# Patient Record
Sex: FEMALE | Race: BLACK OR AFRICAN AMERICAN | NOT HISPANIC OR LATINO | Employment: FULL TIME | URBAN - METROPOLITAN AREA
[De-identification: names, ages, dates, MRNs, and addresses within clinical notes are randomized per-mention and may not be internally consistent; named-entity substitution may affect disease eponyms.]

---

## 2017-11-07 PROBLEM — F50.83 PICA IN ADULTS: Status: ACTIVE | Noted: 2017-11-07

## 2024-05-14 NOTE — PROGRESS NOTES
"Pedro Dunn is a 63 y.o. female.     CC: Leg Numbness    History of Present Illness     Pt comes in today reporting some \"tingling\" of the right leg for about 2 weeks or so w/o injury/pain. Pt reports she was at work and pushed a large heavy box laterally with the right leg, and reports 2 days later having numbness of the leg.  No change in B/B.  Patient reports this is not a work comp injury.      The following portions of the patient's history were reviewed and updated as appropriate: allergies, current medications, past family history, past medical history, past social history, past surgical history, and problem list.    Review of Systems   Constitutional:  Negative for activity change, chills and fever.   Respiratory:  Negative for cough.    Cardiovascular:  Negative for chest pain.   Neurological:  Positive for numbness.   Psychiatric/Behavioral:  Negative for dysphoric mood.        /87   Pulse 74   Temp 97.9 °F (36.6 °C) (Oral)   Resp 16   Ht 160 cm (63\")   Wt 86.2 kg (190 lb)   BMI 33.66 kg/m²     Objective   Physical Exam  Constitutional:       General: She is not in acute distress.     Appearance: She is well-developed.   Pulmonary:      Effort: Pulmonary effort is normal.   Neurological:      Mental Status: She is alert and oriented to person, place, and time.      Sensory: Sensation is intact.      Motor: Motor function is intact.   Psychiatric:         Behavior: Behavior normal.         Thought Content: Thought content normal.         Assessment & Plan   Diagnoses and all orders for this visit:    1. Disturbance of skin sensation (Primary)  -     methylPREDNISolone (Medrol) 4 MG dose pack; follow package directions  Dispense: 21 tablet; Refill: 0  -     diclofenac (VOLTAREN) 75 MG EC tablet; Take 1 tablet by mouth 2 (Two) Times a Day. Start this after finishing the Medrol.  Dispense: 60 tablet; Refill: 2                 "

## 2024-05-15 ENCOUNTER — OFFICE VISIT (OUTPATIENT)
Dept: FAMILY MEDICINE CLINIC | Facility: CLINIC | Age: 64
End: 2024-05-15
Payer: COMMERCIAL

## 2024-05-15 VITALS
BODY MASS INDEX: 33.66 KG/M2 | DIASTOLIC BLOOD PRESSURE: 87 MMHG | SYSTOLIC BLOOD PRESSURE: 145 MMHG | TEMPERATURE: 97.9 F | HEART RATE: 74 BPM | RESPIRATION RATE: 16 BRPM | WEIGHT: 190 LBS | HEIGHT: 63 IN

## 2024-05-15 DIAGNOSIS — R20.9 DISTURBANCE OF SKIN SENSATION: Primary | ICD-10-CM

## 2024-05-15 PROBLEM — K76.0 FATTY LIVER: Status: ACTIVE | Noted: 2024-05-15

## 2024-05-15 PROCEDURE — 99213 OFFICE O/P EST LOW 20 MIN: CPT | Performed by: FAMILY MEDICINE

## 2024-05-15 RX ORDER — METHYLPREDNISOLONE 4 MG/1
TABLET ORAL
Qty: 21 TABLET | Refills: 0 | Status: SHIPPED | OUTPATIENT
Start: 2024-05-15

## 2024-05-15 RX ORDER — DICLOFENAC SODIUM 75 MG/1
75 TABLET, DELAYED RELEASE ORAL 2 TIMES DAILY
Qty: 60 TABLET | Refills: 2 | Status: SHIPPED | OUTPATIENT
Start: 2024-05-15

## 2024-05-15 RX ORDER — FLUTICASONE PROPIONATE 50 MCG
1 SPRAY, SUSPENSION (ML) NASAL
COMMUNITY
Start: 2024-01-09

## 2024-06-04 ENCOUNTER — HOSPITAL ENCOUNTER (EMERGENCY)
Facility: HOSPITAL | Age: 64
Discharge: HOME | End: 2024-06-04
Attending: STUDENT IN AN ORGANIZED HEALTH CARE EDUCATION/TRAINING PROGRAM | Admitting: STUDENT IN AN ORGANIZED HEALTH CARE EDUCATION/TRAINING PROGRAM
Payer: COMMERCIAL

## 2024-06-04 ENCOUNTER — APPOINTMENT (OUTPATIENT)
Dept: CT IMAGING | Facility: HOSPITAL | Age: 64
End: 2024-06-04
Payer: COMMERCIAL

## 2024-06-04 ENCOUNTER — TELEPHONE (OUTPATIENT)
Dept: FAMILY MEDICINE CLINIC | Facility: CLINIC | Age: 64
End: 2024-06-04
Payer: COMMERCIAL

## 2024-06-04 VITALS
RESPIRATION RATE: 16 BRPM | HEIGHT: 63 IN | TEMPERATURE: 99.1 F | BODY MASS INDEX: 32.43 KG/M2 | WEIGHT: 183 LBS | HEART RATE: 82 BPM | OXYGEN SATURATION: 93 % | SYSTOLIC BLOOD PRESSURE: 130 MMHG | DIASTOLIC BLOOD PRESSURE: 77 MMHG

## 2024-06-04 DIAGNOSIS — M54.31 RIGHT SIDED SCIATICA: Primary | ICD-10-CM

## 2024-06-04 DIAGNOSIS — M79.604 PAIN OF RIGHT LOWER EXTREMITY: ICD-10-CM

## 2024-06-04 DIAGNOSIS — R20.9 DISTURBANCE OF SKIN SENSATION: Primary | ICD-10-CM

## 2024-06-04 PROCEDURE — 72131 CT LUMBAR SPINE W/O DYE: CPT

## 2024-06-04 PROCEDURE — 99284 EMERGENCY DEPT VISIT MOD MDM: CPT

## 2024-06-04 RX ORDER — BACLOFEN 10 MG/1
10 TABLET ORAL 3 TIMES DAILY PRN
Qty: 20 TABLET | Refills: 0 | Status: SHIPPED | OUTPATIENT
Start: 2024-06-04

## 2024-06-04 RX ORDER — PREDNISONE 50 MG/1
50 TABLET ORAL DAILY
Qty: 7 TABLET | Refills: 0 | Status: SHIPPED | OUTPATIENT
Start: 2024-06-04

## 2024-06-04 NOTE — ED PROVIDER NOTES
EMERGENCY DEPARTMENT ENCOUNTER    Room Number:  06/06  Date of encounter:  6/4/2024  PCP: Wojciech Garcia MD  Historian: Patient  Chronic or social conditions impacting care (social determinants of health): None    HPI:  Chief Complaint: Back pain, right leg pain  A complete HPI/ROS/PMH/PSH/SH/FH are unobtainable due to: Nothing    Context: Wendy Dunn is a 63 y.o. female with a history of hypercholesterolemia, previous neck surgery.  She presents to the ED c/o acute low back pain with radiation down the right leg.  Patient describes a burning, no sensation from her right buttocks down her right heel.  Symptoms have been present for 1 month.  She denies any overt weakness.  She is able to ambulate.  She denies any true bowel or bladder incontinence.  Patient states she had seen her primary care doctor and was given steroids without much improvement.  She denies any trauma.    Review of prior external notes (non-ED):   Reviewed primary care office visit from 5/15/2024.  Patient seen for right leg tingling.  She was placed on a Medrol Dosepak and Voltaren.    I reviewed an ER visit from 8/5/2021.  Patient seen for sciatica of the right leg with similar complaints.    Review of prior external test results outside of this encounter:  I reviewed a CMP from 5/3/2023.  Creatinine 0.7, potassium 4.0    PAST MEDICAL HISTORY  Active Ambulatory Problems     Diagnosis Date Noted    Osteoporosis 11/18/2015    Pica in adults 11/07/2017    Spinal stenosis in cervical region 12/08/2017    Degeneration of cervical intervertebral disc 12/08/2017    Colon cancer screening 12/27/2017    Pain of upper abdomen 12/01/2020    Gastroesophageal reflux disease 12/01/2020    Nausea and vomiting 12/01/2020    Weight loss 12/01/2020    Fatty liver 05/15/2024     Resolved Ambulatory Problems     Diagnosis Date Noted    No Resolved Ambulatory Problems     Past Medical History:   Diagnosis Date    GERD (gastroesophageal reflux disease)           PAST SURGICAL HISTORY  Past Surgical History:   Procedure Laterality Date    ANTERIOR CERVICAL DISCECTOMY W/ FUSION N/A 2018    Procedure: C4 to C6 anterior cervical discectomy, fusion, and instrumentation;  Surgeon: Padilla Saleh MD;  Location: [facility] MAIN OR;  Service:     COLONOSCOPY N/A 3/5/2018    Procedure: COLONOSCOPY TO CECUM AND TI; COLD POLYPECTOMY;  Surgeon: Benita Barnes MD;  Location: [facility] ENDOSCOPY;  Service:     MAMMO BILATERAL  2015    normal    THYROIDECTOMY, PARTIAL      TOTAL ABDOMINAL HYSTERECTOMY           FAMILY HISTORY  Family History   Problem Relation Age of Onset    Stroke Mother     Hypertension Mother     Stroke Father     Stroke Sister     Malig Hyperthermia Neg Hx          SOCIAL HISTORY  Social History     Socioeconomic History    Marital status:    Tobacco Use    Smoking status: Former     Current packs/day: 0.00     Types: Cigarettes     Quit date:      Years since quittin.4    Smokeless tobacco: Never   Substance and Sexual Activity    Alcohol use: No    Drug use: No    Sexual activity: Never         ALLERGIES  Patient has no known allergies.        REVIEW OF SYSTEMS  All systems reviewed and negative except for those discussed in HPI.       PHYSICAL EXAM    I have reviewed the triage vital signs and nursing notes.    ED Triage Vitals   Temp Heart Rate Resp BP SpO2   24 1128 24 1128 24 1128 24 1135 24 1128   99.1 °F (37.3 °C) 103 16 157/93 99 %      Temp src Heart Rate Source Patient Position BP Location FiO2 (%)   24 1128 24 1128 -- -- --   Temporal Monitor          Physical Exam  GENERAL: Alert, oriented, nontoxic-appearing, not distressed  HENT: head atraumatic, no nuchal rigidity  EYES: no scleral icterus, EOMI  CV: regular rhythm, regular rate, no murmur  RESPIRATORY: normal effort, CTA  ABDOMEN: soft, nontender  MUSCULOSKELETAL: Preserved range of motion of bilateral lower extremities.  2+  pulses intact distally.  Calves soft bilaterally.  NEURO: alert, 5/5 strength in bilateral lower extremities, negative straight leg raise bilaterally.  Sensation grossly intact distally.  SKIN: warm, dry      RADIOLOGY  CT Lumbar Spine Without Contrast    Result Date: 6/4/2024  CT LUMBAR SPINE WITHOUT CONTRAST  HISTORY: Back pain, right radiculopathy for 1 month.  COMPARISON: None.  FINDINGS: There is mild-to-moderate loss of disc height and vacuum disc effect at L4-L5 as well as retrolisthesis of L4 upon L5 estimated to be 6 mm.  L1-L2: There is no evidence of a disc bulge or herniation.  L2-L3: A mild central disc bulge is present with no evidence of herniation. Mild facet degenerative disease is present bilaterally. There is mild and mild-to-moderate foraminal stenosis on the right and left respectively secondary to extension of a small disc osteophyte complex into the neural foramen and to congenitally shortened pedicles.  L3-L4: Mild facet degenerative disease is present bilaterally. A mild broad-based disc osteophyte complex is present which results in mild canal stenosis. There is likely moderate lateral recess narrowing bilaterally secondary to posterior element degenerative disease, broad-based disc osteophyte complex and the congenitally shortened pedicles. Mild foraminal stenosis is present bilaterally secondary to extension of a small disc osteophyte complex into the neural foramen.  L4-L5: A mild broad-based disc osteophyte complex is present which is slightly more prominent to the left. Lateral recess narrowing is present bilaterally secondary to the broad-based disc osteophyte complex, mild facet and ligamentum flavum hypertrophy and the retrolisthesis of L4 upon L5. Mild foraminal stenosis is present bilaterally, slightly more prominent on the left.  L5-S1: Transitional anatomy is appreciated with what is being considered to be partial sacralization of L5. Mild facet degenerative disease is present  bilaterally.      1.  Transitional anatomy is appreciated with what is being considered to be partial sacralization of L5. Careful correlation with all imaging is required prior to any intervention given the presence of transitional anatomy. 2.  There is no evidence of a focal herniation. Multilevel degenerative disease involving the lumbar spine is noted including loss of disc height, vacuum disc effect and retrolisthesis at L4-L5. There is mild canal stenosis and likely moderate lateral recess narrowing bilaterally at L3-L4. Lateral recess narrowing is also present bilaterally at L4-L5. Lateral recess narrowing and spinal stenosis is accentuated by congenitally shortened pedicles. Further evaluation could be performed with a MRI examination of the lumbar spine as indicated.      Radiation dose reduction techniques were utilized, including automated exposure control and exposure modulation based on body size.        I ordered the above noted radiological studies. Reviewed by me and discussed with radiologist.  See dictation for official radiology interpretation.      MEDICATIONS GIVEN IN ER    Medications - No data to display      ADDITIONAL ORDERS CONSIDERED BUT NOT ORDERED:  Considered admission however patient has a normal neuroexam.  No evidence of cauda equina syndrome.      PROGRESS, DATA ANALYSIS, CONSULTS, AND MEDICAL DECISION MAKING    All labs have been independently interpreted by myself.  All radiology studies have been independently interpreted by myself and discussed with radiologist dictating the report.   EKG's independently interpreted by myself.  Discussion below represents my analysis of pertinent findings related to patient's condition, differential diagnosis, treatment plan and final disposition.    I have discussed case with Dr. Montez, emergency room physician.  He has performed his own bedside examination and agrees with treatment plan.    ED Course as of 06/04/24 1536   Tue Jun 04, 2024    1144 Patient presents with a 1 month history of low back pain with radiation down the right leg.  Patient describes a burning, numb sensation.  No overt weakness on exam.  No bowel or bladder incontinence.  Suspect likely sciatica.  Plan for CT imaging of the lumbar spine for further evaluation. [EE]   1323 Lumbar spine images independently interpreted myself show no evidence of acute compression fracture or significant disc herniation. [EE]   1336 Updated patient on CT findings.  She is neurologically intact without any evidence of weakness.  No evidence of DVT.  She has had similar symptoms in the past with her sciatica.  She also has had a history of cervical surgery after cervical radiculopathy.  We will discharge her on a course of prednisone and have her follow-up with her neurosurgeon. [EE]      ED Course User Index  [EE] Simone Yee PA       AS OF 15:36 EDT VITALS:    BP - 130/77  HR - 82  TEMP - 99.1 °F (37.3 °C) (Temporal)  O2 SATS - 93%        DIAGNOSIS  Final diagnoses:   Right sided sciatica         DISPOSITION  Discharged    Admission was considered but after careful review of the patient's presentation, physical examination, diagnostic results, and response to treatment the patient may be safely discharged with outpatient follow-up.         Dictated utilizing Dragon dictation     Simone Yee PA  06/04/24 9736

## 2024-06-04 NOTE — TELEPHONE ENCOUNTER
Left patient a voicemail informing her that a her orthopedic referral was place by Dr. Garcia. Informed her the the referral process typically can take up to 7-14 days once approved someone from the orthopedic office will contact her for scheduling.  HUB TO RELAY

## 2024-06-04 NOTE — ED NOTES
Pt arrives via PV from home; states that she has been having right leg weakness/numbness/tingling for approximately a month. Reports the numbness has been going down the back of the leg to the heel, which has worsen to the front of the leg. Has been seen by her PCP with steroids/muscle relaxers with no relief. Pt is Aox4 at time of evaluation and answering questions appropriately.

## 2024-06-04 NOTE — TELEPHONE ENCOUNTER
Caller: Wendy Dunn    Relationship to patient: Self    Best call back number:     Wendy Dunn (Self) [phone] (Home)       Chief complaint: SHE IS STILL HAVING ISSUES WITH HER LEG   PREDNISONE DID NOT HELP     PATIENT STATED THAT HER LEG IS TINGLING       Patient directed to call 911 or go to their nearest emergency room.     Patient verbalized understanding: [x] Yes  [] No  If no, why?    Additional notes:

## 2024-06-04 NOTE — ED PROVIDER NOTES
EMERGENCY DEPARTMENT MD ATTESTATION NOTE    Room Number:  06/06  PCP: Wojciech Garcia MD  Independent Historians: Patient    HPI:    Context: Wendy Dunn is a 63 y.o. female with a medical history of chronic neck pain s/p surgery, hypercholesterolemia who presents to the ED c/o acute low back pain and right leg pain.  Patient states she has some weakness in the right leg particularly when going up and down stairs.  Patient states the pain in her back is minimal.  Symptoms have been going for approximately 1 month, she relates the onset to having moved several heavy boxes with her leg.  Patient has tingling sensation in the right lower extremity.  Patient denies bowel and bladder incontinence, saddle paresthesias and fevers.        Review of prior external notes (non-ED) -and- Review of prior external test results outside of this encounter: Office visit with family medicine from 5/15/2024 reviewed and notable for presentation secondary to disturbance of skin sensation.  Plan at that time was to trial Voltaren and methylprednisolone and follow-up as needed    Prescription drug monitoring program review:           PHYSICAL EXAM    I have reviewed the triage vital signs and nursing notes.    ED Triage Vitals   Temp Heart Rate Resp BP SpO2   06/04/24 1128 06/04/24 1128 06/04/24 1128 06/04/24 1135 06/04/24 1128   99.1 °F (37.3 °C) 103 16 157/93 99 %      Temp src Heart Rate Source Patient Position BP Location FiO2 (%)   06/04/24 1128 06/04/24 1128 -- -- --   Temporal Monitor          Physical Exam  GENERAL: alert, no acute distress  SKIN: Warm, dry  HENT: Normocephalic, atraumatic  EYES: no scleral icterus  CV: regular rhythm, regular rate  RESPIRATORY: normal effort, lungs clear  ABDOMEN: soft, nontender, nondistended  MUSCULOSKELETAL: no deformity.  Mild weakness to the right lower extremity-likely limited secondary to pain.  NEURO: alert, moves all extremities, follows commands            MEDICATIONS GIVEN IN  ER  Medications - No data to display      ORDERS PLACED DURING THIS VISIT:  Orders Placed This Encounter   Procedures   • CT Lumbar Spine Without Contrast         PROCEDURES  Procedures            PROGRESS, DATA ANALYSIS, CONSULTS, AND MEDICAL DECISION MAKING  All labs have been independently interpreted by me.  All radiology studies have been reviewed by me. All EKG's have been independently viewed and interpreted by me.  Discussion below represents my analysis of pertinent findings related to patient's condition, differential diagnosis, treatment plan and final disposition.    Differential diagnosis includes but is not limited to sciatica, muscle strain, cauda equina.    Clinical Scores:                   ED Course as of 06/04/24 1837   Tue Jun 04, 2024   1144 Patient presents with a 1 month history of low back pain with radiation down the right leg.  Patient describes a burning, numb sensation.  No overt weakness on exam.  No bowel or bladder incontinence.  Suspect likely sciatica.  Plan for CT imaging of the lumbar spine for further evaluation. [EE]   1323 Lumbar spine images independently interpreted myself show no evidence of acute compression fracture or significant disc herniation. [EE]   1336 Updated patient on CT findings.  She is neurologically intact without any evidence of weakness.  No evidence of DVT.  She has had similar symptoms in the past with her sciatica.  She also has had a history of cervical surgery after cervical radiculopathy.  We will discharge her on a course of prednisone and have her follow-up with her neurosurgeon. [EE]      ED Course User Index  [EE] Simone Yee PA       MDM: 63-year-old female presenting for evaluation of back pain and right lower extremity discomfort.  Patient with no red flag symptoms.  Had a lengthy discussion with patient regarding CT imaging and plan moving forward.  I offered admission to the observation unit for MRI and neurosurgery consultation.  At this  time patient would like to discharge home.  Will try a larger dose of steroids and arrange for outpatient neurosurgery follow-up.      COMPLEXITY OF CARE  Admission was considered but after careful review of the patient's presentation, physical examination, diagnostic results, and response to treatment the patient may be safely discharged with outpatient follow-up.    Please note that portions of this document were completed with a voice recognition program.    Note Disclaimer: At [facility], we believe that sharing information builds trust and better relationships. You are receiving this note because you recently visited [facility]. It is possible you will see health information before a provider has talked with you about it. This kind of information can be easy to misunderstand. To help you fully understand what it means for your health, we urge you to discuss this note with your provider.         Jesus Montez MD  06/04/24 9684       Jesus Montez MD  06/04/24 9453       Jesus Montez MD  06/04/24 3240

## 2024-06-12 ENCOUNTER — TELEPHONE (OUTPATIENT)
Dept: NEUROSURGERY | Facility: CLINIC | Age: 64
End: 2024-06-12
Payer: COMMERCIAL

## 2024-06-12 NOTE — TELEPHONE ENCOUNTER
Caller: CHRISSY ANDRADE    Relationship: SELF    Best call back number: [phone]    What was the call regarding: PATIENT IS SCHEDULED 06/27 NEW PATIENT - CALLED STATING THAT PAIN IN HER LEGS IS GETTING WORST AND DOES NOT THINK SHE CAN MAKE IT UNTIL HER APPT - WANTS TO KNOW IF SHE IS ABLE TO BE SEEN SOONER    STATES SHE CAN WALK A LITTLE - HER APTS HAS 3 FLIGHTS OF STAIRS AND IT'S HARD TO HER TO GET UP AND DOWN THE STAIRS      PLEASE CALL PATIENT ADVISE  THANK YOU

## 2024-06-12 NOTE — TELEPHONE ENCOUNTER
Called and lvm to advise the patient to be seen at the ER as there is an appt on 6/26 but seeing as it is still so far that will not be of any use to the patient.

## 2024-06-15 ENCOUNTER — APPOINTMENT (OUTPATIENT)
Dept: CARDIOLOGY | Facility: HOSPITAL | Age: 64
End: 2024-06-15
Payer: COMMERCIAL

## 2024-06-15 ENCOUNTER — APPOINTMENT (OUTPATIENT)
Dept: GENERAL RADIOLOGY | Facility: HOSPITAL | Age: 64
End: 2024-06-15
Payer: COMMERCIAL

## 2024-06-15 ENCOUNTER — HOSPITAL ENCOUNTER (OUTPATIENT)
Facility: HOSPITAL | Age: 64
Setting detail: OBSERVATION
Discharge: HOME | End: 2024-06-17
Attending: EMERGENCY MEDICINE | Admitting: EMERGENCY MEDICINE
Payer: COMMERCIAL

## 2024-06-15 ENCOUNTER — APPOINTMENT (OUTPATIENT)
Dept: MRI IMAGING | Facility: HOSPITAL | Age: 64
End: 2024-06-15
Payer: COMMERCIAL

## 2024-06-15 DIAGNOSIS — M54.41 ACUTE RIGHT-SIDED LOW BACK PAIN WITH RIGHT-SIDED SCIATICA: Primary | ICD-10-CM

## 2024-06-15 DIAGNOSIS — M54.16 ACUTE RIGHT LUMBAR RADICULOPATHY: ICD-10-CM

## 2024-06-15 LAB
ANION GAP SERPL CALCULATED.3IONS-SCNC: 6 MMOL/L (ref 5–15)
BACTERIA UR QL AUTO: ABNORMAL /HPF
BASOPHILS # BLD AUTO: 0.03 10*3/MM3 (ref 0–0.2)
BASOPHILS NFR BLD AUTO: 0.4 % (ref 0–1.5)
BH CV LOWER VASCULAR LEFT COMMON FEMORAL AUGMENT: NORMAL
BH CV LOWER VASCULAR LEFT COMMON FEMORAL COMPETENT: NORMAL
BH CV LOWER VASCULAR LEFT COMMON FEMORAL COMPRESS: NORMAL
BH CV LOWER VASCULAR LEFT COMMON FEMORAL PHASIC: NORMAL
BH CV LOWER VASCULAR LEFT COMMON FEMORAL SPONT: NORMAL
BH CV LOWER VASCULAR RIGHT COMMON FEMORAL AUGMENT: NORMAL
BH CV LOWER VASCULAR RIGHT COMMON FEMORAL COMPETENT: NORMAL
BH CV LOWER VASCULAR RIGHT COMMON FEMORAL COMPRESS: NORMAL
BH CV LOWER VASCULAR RIGHT COMMON FEMORAL PHASIC: NORMAL
BH CV LOWER VASCULAR RIGHT COMMON FEMORAL SPONT: NORMAL
BH CV LOWER VASCULAR RIGHT DISTAL FEMORAL COMPRESS: NORMAL
BH CV LOWER VASCULAR RIGHT GASTRONEMIUS COMPRESS: NORMAL
BH CV LOWER VASCULAR RIGHT GREATER SAPH AK COMPRESS: NORMAL
BH CV LOWER VASCULAR RIGHT GREATER SAPH BK COMPRESS: NORMAL
BH CV LOWER VASCULAR RIGHT LESSER SAPH COMPRESS: NORMAL
BH CV LOWER VASCULAR RIGHT MID FEMORAL AUGMENT: NORMAL
BH CV LOWER VASCULAR RIGHT MID FEMORAL COMPETENT: NORMAL
BH CV LOWER VASCULAR RIGHT MID FEMORAL COMPRESS: NORMAL
BH CV LOWER VASCULAR RIGHT MID FEMORAL PHASIC: NORMAL
BH CV LOWER VASCULAR RIGHT MID FEMORAL SPONT: NORMAL
BH CV LOWER VASCULAR RIGHT PERONEAL COMPRESS: NORMAL
BH CV LOWER VASCULAR RIGHT POPLITEAL AUGMENT: NORMAL
BH CV LOWER VASCULAR RIGHT POPLITEAL COMPETENT: NORMAL
BH CV LOWER VASCULAR RIGHT POPLITEAL COMPRESS: NORMAL
BH CV LOWER VASCULAR RIGHT POPLITEAL PHASIC: NORMAL
BH CV LOWER VASCULAR RIGHT POPLITEAL SPONT: NORMAL
BH CV LOWER VASCULAR RIGHT POSTERIOR TIBIAL COMPRESS: NORMAL
BH CV LOWER VASCULAR RIGHT PROFUNDA FEMORAL COMPRESS: NORMAL
BH CV LOWER VASCULAR RIGHT PROXIMAL FEMORAL COMPRESS: NORMAL
BH CV LOWER VASCULAR RIGHT SAPHENOFEMORAL JUNCTION COMPRESS: NORMAL
BH CV VAS POP FLUID COLLECTED: 1
BH CV VAS PRELIMINARY FINDINGS SCRIPTING: 1
BILIRUB UR QL STRIP: NEGATIVE
BUN SERPL-MCNC: 10 MG/DL (ref 8–23)
BUN/CREAT SERPL: 12.5 (ref 7–25)
CALCIUM SPEC-SCNC: 9 MG/DL (ref 8.6–10.5)
CHLORIDE SERPL-SCNC: 104 MMOL/L (ref 98–107)
CLARITY UR: CLEAR
CO2 SERPL-SCNC: 29 MMOL/L (ref 22–29)
COLOR UR: YELLOW
CREAT SERPL-MCNC: 0.8 MG/DL (ref 0.57–1)
DEPRECATED RDW RBC AUTO: 46.2 FL (ref 37–54)
EGFRCR SERPLBLD CKD-EPI 2021: 83.4 ML/MIN/1.73
EOSINOPHIL # BLD AUTO: 0.08 10*3/MM3 (ref 0–0.4)
EOSINOPHIL NFR BLD AUTO: 1.1 % (ref 0.3–6.2)
ERYTHROCYTE [DISTWIDTH] IN BLOOD BY AUTOMATED COUNT: 13.4 % (ref 12.3–15.4)
GLUCOSE SERPL-MCNC: 92 MG/DL (ref 65–99)
GLUCOSE UR STRIP-MCNC: NEGATIVE MG/DL
HCT VFR BLD AUTO: 44.5 % (ref 34–46.6)
HGB BLD-MCNC: 14.6 G/DL (ref 12–15.9)
HGB UR QL STRIP.AUTO: ABNORMAL
HYALINE CASTS UR QL AUTO: ABNORMAL /LPF
IMM GRANULOCYTES # BLD AUTO: 0.05 10*3/MM3 (ref 0–0.05)
IMM GRANULOCYTES NFR BLD AUTO: 0.7 % (ref 0–0.5)
KETONES UR QL STRIP: NEGATIVE
LEUKOCYTE ESTERASE UR QL STRIP.AUTO: NEGATIVE
LYMPHOCYTES # BLD AUTO: 2.78 10*3/MM3 (ref 0.7–3.1)
LYMPHOCYTES NFR BLD AUTO: 38.7 % (ref 19.6–45.3)
MCH RBC QN AUTO: 30.7 PG (ref 26.6–33)
MCHC RBC AUTO-ENTMCNC: 32.8 G/DL (ref 31.5–35.7)
MCV RBC AUTO: 93.5 FL (ref 79–97)
MONOCYTES # BLD AUTO: 0.75 10*3/MM3 (ref 0.1–0.9)
MONOCYTES NFR BLD AUTO: 10.4 % (ref 5–12)
NEUTROPHILS NFR BLD AUTO: 3.5 10*3/MM3 (ref 1.7–7)
NEUTROPHILS NFR BLD AUTO: 48.7 % (ref 42.7–76)
NITRITE UR QL STRIP: NEGATIVE
NRBC BLD AUTO-RTO: 0 /100 WBC (ref 0–0.2)
PH UR STRIP.AUTO: 6.5 [PH] (ref 5–8)
PLATELET # BLD AUTO: 275 10*3/MM3 (ref 140–450)
PMV BLD AUTO: 9.4 FL (ref 6–12)
POTASSIUM SERPL-SCNC: 4.3 MMOL/L (ref 3.5–5.2)
PROT UR QL STRIP: NEGATIVE
RBC # BLD AUTO: 4.76 10*6/MM3 (ref 3.77–5.28)
RBC # UR STRIP: ABNORMAL /HPF
REF LAB TEST METHOD: ABNORMAL
SODIUM SERPL-SCNC: 139 MMOL/L (ref 136–145)
SP GR UR STRIP: 1.01 (ref 1–1.03)
SQUAMOUS #/AREA URNS HPF: ABNORMAL /HPF
UROBILINOGEN UR QL STRIP: ABNORMAL
WBC # UR STRIP: ABNORMAL /HPF
WBC NRBC COR # BLD AUTO: 7.19 10*3/MM3 (ref 3.4–10.8)

## 2024-06-15 PROCEDURE — 25010000002 DEXAMETHASONE SODIUM PHOSPHATE 20 MG/5ML SOLUTION: Performed by: NURSE PRACTITIONER

## 2024-06-15 PROCEDURE — 80048 BASIC METABOLIC PNL TOTAL CA: CPT | Performed by: EMERGENCY MEDICINE

## 2024-06-15 PROCEDURE — 93971 EXTREMITY STUDY: CPT

## 2024-06-15 PROCEDURE — G0378 HOSPITAL OBSERVATION PER HR: HCPCS

## 2024-06-15 PROCEDURE — 96374 THER/PROPH/DIAG INJ IV PUSH: CPT

## 2024-06-15 PROCEDURE — 99285 EMERGENCY DEPT VISIT HI MDM: CPT

## 2024-06-15 PROCEDURE — 81001 URINALYSIS AUTO W/SCOPE: CPT | Performed by: NURSE PRACTITIONER

## 2024-06-15 PROCEDURE — 93971 EXTREMITY STUDY: CPT | Performed by: SURGERY

## 2024-06-15 PROCEDURE — 73560 X-RAY EXAM OF KNEE 1 OR 2: CPT

## 2024-06-15 PROCEDURE — 85025 COMPLETE CBC W/AUTO DIFF WBC: CPT | Performed by: EMERGENCY MEDICINE

## 2024-06-15 PROCEDURE — 72148 MRI LUMBAR SPINE W/O DYE: CPT

## 2024-06-15 RX ORDER — UREA 10 %
500 LOTION (ML) TOPICAL DAILY
COMMUNITY

## 2024-06-15 RX ORDER — ACETAMINOPHEN 325 MG/1
650 TABLET ORAL EVERY 4 HOURS PRN
Status: DISCONTINUED | OUTPATIENT
Start: 2024-06-15 | End: 2024-06-17 | Stop reason: HOSPADM

## 2024-06-15 RX ORDER — SODIUM CHLORIDE 0.9 % (FLUSH) 0.9 %
10 SYRINGE (ML) INJECTION EVERY 12 HOURS SCHEDULED
Status: DISCONTINUED | OUTPATIENT
Start: 2024-06-15 | End: 2024-06-17 | Stop reason: HOSPADM

## 2024-06-15 RX ORDER — AMOXICILLIN 250 MG
2 CAPSULE ORAL 2 TIMES DAILY PRN
Status: DISCONTINUED | OUTPATIENT
Start: 2024-06-15 | End: 2024-06-17 | Stop reason: HOSPADM

## 2024-06-15 RX ORDER — NITROGLYCERIN 0.4 MG/1
0.4 TABLET SUBLINGUAL
Status: DISCONTINUED | OUTPATIENT
Start: 2024-06-15 | End: 2024-06-17 | Stop reason: HOSPADM

## 2024-06-15 RX ORDER — DEXAMETHASONE SODIUM PHOSPHATE 10 MG/ML
10 INJECTION INTRAMUSCULAR; INTRAVENOUS ONCE
Status: DISCONTINUED | OUTPATIENT
Start: 2024-06-15 | End: 2024-06-17 | Stop reason: HOSPADM

## 2024-06-15 RX ORDER — SODIUM CHLORIDE 0.9 % (FLUSH) 0.9 %
10 SYRINGE (ML) INJECTION AS NEEDED
Status: DISCONTINUED | OUTPATIENT
Start: 2024-06-15 | End: 2024-06-17 | Stop reason: HOSPADM

## 2024-06-15 RX ORDER — POLYETHYLENE GLYCOL 3350 17 G/17G
17 POWDER, FOR SOLUTION ORAL DAILY PRN
Status: DISCONTINUED | OUTPATIENT
Start: 2024-06-15 | End: 2024-06-17 | Stop reason: HOSPADM

## 2024-06-15 RX ORDER — BISACODYL 10 MG
10 SUPPOSITORY, RECTAL RECTAL DAILY PRN
Status: DISCONTINUED | OUTPATIENT
Start: 2024-06-15 | End: 2024-06-17 | Stop reason: HOSPADM

## 2024-06-15 RX ORDER — HYDROCODONE BITARTRATE AND ACETAMINOPHEN 7.5; 325 MG/1; MG/1
1 TABLET ORAL ONCE
Status: COMPLETED | OUTPATIENT
Start: 2024-06-15 | End: 2024-06-15

## 2024-06-15 RX ORDER — HYDROCODONE BITARTRATE AND ACETAMINOPHEN 5; 325 MG/1; MG/1
1 TABLET ORAL EVERY 6 HOURS PRN
Status: DISCONTINUED | OUTPATIENT
Start: 2024-06-15 | End: 2024-06-17 | Stop reason: HOSPADM

## 2024-06-15 RX ORDER — BISACODYL 5 MG/1
5 TABLET, DELAYED RELEASE ORAL DAILY PRN
Status: DISCONTINUED | OUTPATIENT
Start: 2024-06-15 | End: 2024-06-17 | Stop reason: HOSPADM

## 2024-06-15 RX ORDER — ROSUVASTATIN CALCIUM 20 MG/1
10 TABLET, COATED ORAL DAILY
Status: DISCONTINUED | OUTPATIENT
Start: 2024-06-15 | End: 2024-06-17 | Stop reason: HOSPADM

## 2024-06-15 RX ORDER — KETOROLAC TROMETHAMINE 15 MG/ML
15 INJECTION, SOLUTION INTRAMUSCULAR; INTRAVENOUS EVERY 6 HOURS PRN
Status: DISCONTINUED | OUTPATIENT
Start: 2024-06-15 | End: 2024-06-17 | Stop reason: HOSPADM

## 2024-06-15 RX ORDER — UREA 10 %
500 LOTION (ML) TOPICAL DAILY
Status: DISCONTINUED | OUTPATIENT
Start: 2024-06-15 | End: 2024-06-17 | Stop reason: HOSPADM

## 2024-06-15 RX ORDER — METHOCARBAMOL 750 MG/1
750 TABLET, FILM COATED ORAL 4 TIMES DAILY
Status: DISCONTINUED | OUTPATIENT
Start: 2024-06-15 | End: 2024-06-17 | Stop reason: HOSPADM

## 2024-06-15 RX ORDER — LIDOCAINE 4 G/G
1 PATCH TOPICAL
Status: DISCONTINUED | OUTPATIENT
Start: 2024-06-15 | End: 2024-06-17 | Stop reason: HOSPADM

## 2024-06-15 RX ORDER — SODIUM CHLORIDE 9 MG/ML
40 INJECTION, SOLUTION INTRAVENOUS AS NEEDED
Status: DISCONTINUED | OUTPATIENT
Start: 2024-06-15 | End: 2024-06-17 | Stop reason: HOSPADM

## 2024-06-15 RX ORDER — DEXAMETHASONE SODIUM PHOSPHATE 4 MG/ML
4 INJECTION, SOLUTION INTRA-ARTICULAR; INTRALESIONAL; INTRAMUSCULAR; INTRAVENOUS; SOFT TISSUE EVERY 8 HOURS
Status: DISCONTINUED | OUTPATIENT
Start: 2024-06-15 | End: 2024-06-17 | Stop reason: HOSPADM

## 2024-06-15 RX ORDER — DIAZEPAM 5 MG/1
5 TABLET ORAL ONCE AS NEEDED
Status: DISCONTINUED | OUTPATIENT
Start: 2024-06-15 | End: 2024-06-17 | Stop reason: HOSPADM

## 2024-06-15 RX ORDER — CHLORAL HYDRATE 500 MG
1000 CAPSULE ORAL
COMMUNITY

## 2024-06-15 RX ADMIN — Medication 10 ML: at 15:00

## 2024-06-15 RX ADMIN — METHOCARBAMOL TABLETS 750 MG: 750 TABLET, COATED ORAL at 18:04

## 2024-06-15 RX ADMIN — HYDROCODONE BITARTRATE AND ACETAMINOPHEN 1 TABLET: 7.5; 325 TABLET ORAL at 11:59

## 2024-06-15 RX ADMIN — Medication 10 ML: at 21:39

## 2024-06-15 RX ADMIN — LIDOCAINE 1 PATCH: 4 PATCH TOPICAL at 18:04

## 2024-06-15 RX ADMIN — Medication 500 MCG: at 18:04

## 2024-06-15 RX ADMIN — METHOCARBAMOL TABLETS 750 MG: 750 TABLET, COATED ORAL at 21:38

## 2024-06-15 RX ADMIN — ROSUVASTATIN CALCIUM 10 MG: 20 TABLET, FILM COATED ORAL at 21:39

## 2024-06-15 RX ADMIN — DEXAMETHASONE SODIUM PHOSPHATE 4 MG: 4 INJECTION, SOLUTION INTRAMUSCULAR; INTRAVENOUS at 19:46

## 2024-06-15 NOTE — ED NOTES
.Nursing report ED to floor  Wendy Dunn  63 y.o.  female    HPI :  HPI (Adult)  Stated Reason for Visit: leg swelling  History Obtained From: patient    Chief Complaint  Chief Complaint   Patient presents with    Leg Swelling       Admitting doctor:   Álvaro Oscar II, MD    Admitting diagnosis:   The encounter diagnosis was Acute right-sided low back pain with right-sided sciatica.    Code status:   Current Code Status       Date Active Code Status Order ID Comments User Context       6/15/2024 1348 CPR (Attempt to Resuscitate) [id number]  Becky Pimentel APRN ED        Question Answer    Code Status (Patient has no pulse and is not breathing) CPR (Attempt to Resuscitate)    Medical Interventions (Patient has pulse or is breathing) Full Support    Level Of Support Discussed With Patient                    Allergies:   Patient has no known allergies.    Isolation:   No active isolations    Intake and Output  No intake or output data in the 24 hours ending 06/15/24 1357    Weight:   There were no vitals filed for this visit.    Most recent vitals:   Vitals:    06/15/24 1147 06/15/24 1202 06/15/24 1203 06/15/24 1357   BP:  140/91     Pulse: 118  97 75   Resp: 16      Temp: 98.9 °F (37.2 °C)      TempSrc: Tympanic      SpO2: 98%  96% 96%       Active LDAs/IV Access:   Lines, Drains & Airways       Active LDAs       None                    Labs (abnormal labs have a star):   Labs Reviewed   CBC WITH AUTO DIFFERENTIAL - Abnormal; Notable for the following components:       Result Value    Immature Grans % 0.7 (*)     All other components within normal limits   BASIC METABOLIC PANEL - Normal    Narrative:     GFR Normal >60  Chronic Kidney Disease <60  Kidney Failure <15     CBC AND DIFFERENTIAL    Narrative:     The following orders were created for panel order CBC & Differential.  Procedure                               Abnormality         Status                     ---------                                -----------         ------                     CBC Auto Differential[[id number]]        Abnormal            Final result                 Please view results for these tests on the individual orders.       EKG:   No orders to display       Meds given in ED:   Medications   sodium chloride 0.9 % flush 10 mL (has no administration in time range)   dexAMETHasone (DECADRON) injection 10 mg (10 mg Intravenous Not Given 6/15/24 1238)   sodium chloride 0.9 % flush 10 mL (has no administration in time range)   sodium chloride 0.9 % flush 10 mL (has no administration in time range)   sodium chloride 0.9 % infusion 40 mL (has no administration in time range)   acetaminophen (TYLENOL) tablet 650 mg (has no administration in time range)   nitroglycerin (NITROSTAT) SL tablet 0.4 mg (has no administration in time range)   sennosides-docusate (PERICOLACE) 8.6-50 MG per tablet 2 tablet (has no administration in time range)     And   polyethylene glycol (MIRALAX) packet 17 g (has no administration in time range)     And   bisacodyl (DULCOLAX) EC tablet 5 mg (has no administration in time range)     And   bisacodyl (DULCOLAX) suppository 10 mg (has no administration in time range)   dexAMETHasone sodium phosphate injection 4 mg (has no administration in time range)   HYDROcodone-acetaminophen (NORCO) 7.5-325 MG per tablet 1 tablet (1 tablet Oral Given 6/15/24 1159)       Imaging results:  XR Knee 1 or 2 View Right    Result Date: 6/15/2024   As described.    This report was finalized on 6/15/2024 1:51 PM by Dr. Haim Dwyer M.D on Workstation: [id number]       Ambulatory status:   - assist    Social issues:   Social History     Socioeconomic History    Marital status:    Tobacco Use    Smoking status: Former     Current packs/day: 0.00     Types: Cigarettes     Quit date: 2015     Years since quittin.4    Smokeless tobacco: Never   Substance and Sexual Activity    Alcohol use: No    Drug use: No    Sexual activity:  Never       Peripheral Neurovascular  Peripheral Neurovascular (Adult)  Peripheral Neurovascular WDL: WDL    Neuro Cognitive  Neuro Cognitive (Adult)  Cognitive/Neuro/Behavioral WDL: WDL    Learning  Learning Assessment (Adult)  Learning Readiness and Ability: no barriers identified  Education Provided  Person Taught: patient    Respiratory  Respiratory WDL  Respiratory WDL: WDL    Abdominal Pain       Pain Assessments  Pain (Adult)  (0-10) Pain Rating: Rest: 8      Rick Yanes RN  06/15/24 13:57 EDT

## 2024-06-15 NOTE — H&P
[facility]   HISTORY AND PHYSICAL    Patient Name: Wendy Dunn  : 1960  MRN: [id number]  Primary Care Physician:  Wojciech Garcia MD  Date of admission: 6/15/2024    Subjective   Subjective     Chief Complaint:   Chief Complaint   Patient presents with    Leg Swelling         HPI:    Wnedy Dunn is a pleasant afebrile 63 y.o. black female with a past medical history of GERD and osteoporosis.     She presents to the Emergency Department at [facility] today with complaint of low back and right leg pain.  She has been admitted to the ED observation unit for further testing and evaluation.    Patient states that approximately 1 month ago she developed low back discomfort.  She denies any trauma or injury.  Patient reports that she is having pain and numbness that radiate down her right leg.  She has been seeing her primary care provider for this and was set up to be evaluated by the neurosurgery service.  Patient reports she came to the ER today because of worsening discomfort and from her right leg causing her weakness.    She denies any loss of bowel or bladder control, saddle anesthesia.  No fevers or chills.  At baseline she does not use a cane or a walker.    States she has been prescribed steroids which did not cause any alleviation of her discomfort.      Review of Systems   All systems were reviewed and negative except for: What is mentioned above    Personal History     Past Medical History:   Diagnosis Date    GERD (gastroesophageal reflux disease)     Nausea and vomiting 2020    Osteoporosis        Past Surgical History:   Procedure Laterality Date    ANTERIOR CERVICAL DISCECTOMY W/ FUSION N/A 2018    Procedure: C4 to C6 anterior cervical discectomy, fusion, and instrumentation;  Surgeon: Padilla Saleh MD;  Location: [facility];  Service:     COLONOSCOPY N/A 3/5/2018    Procedure: COLONOSCOPY TO CECUM AND TI; COLD POLYPECTOMY;  Surgeon: Benita Barnes  MD;  Location: [facility] ENDOSCOPY;  Service:     MAMMO BILATERAL  04/2015    normal    THYROIDECTOMY, PARTIAL      TOTAL ABDOMINAL HYSTERECTOMY  2003       Family History: family history includes Hypertension in her mother; Stroke in her father, mother, and sister. Otherwise pertinent FHx was reviewed and not pertinent to current issue.    Social History:  reports that she quit smoking about 9 years ago. Her smoking use included cigarettes. She has never used smokeless tobacco. She reports that she does not drink alcohol and does not use drugs.    Home Medications:  Calcium Carbonate-Vit D-Min, baclofen, fish oil, rosuvastatin, vitamin B-12, and vitamin E    Allergies:  No Known Allergies    Objective   Objective     Vitals:   Temp:  [98.9 °F (37.2 °C)] 98.9 °F (37.2 °C)  Heart Rate:  [] 81  Resp:  [16] 16  BP: (140-160)/() 160/101  Physical Exam    Constitutional: Awake, alert   Eyes: PERRLA, sclerae anicteric, no conjunctival injection   HENT: NCAT, mucous membranes moist   Neck: Supple, no thyromegaly, no lymphadenopathy, trachea midline   Respiratory: Clear to auscultation bilaterally, nonlabored respirations    Cardiovascular: RRR, no murmurs, rubs, or gallops, palpable pedal pulses bilaterally   Gastrointestinal: Positive bowel sounds, soft, nontender, nondistended   Musculoskeletal: No bilateral ankle edema, no clubbing or cyanosis to extremities   Psychiatric: Appropriate affect, cooperative   Neurologic: Oriented x 3, strength symmetric in all extremities, Cranial Nerves grossly intact to confrontation, speech clear   Skin: No rashes     Result Review    Result Review:  I have personally reviewed the results from the time of this admission to 6/15/2024 14:14 EDT and agree with these findings:  [x]  Laboratory list / accordion  []  Microbiology  []  Radiology  []  EKG/Telemetry   []  Cardiology/Vascular   []  Pathology  []  Old records  []  Other:  Most notable findings include: Lab workup fairly  unremarkable, small blood in urinalysis, duplex right lower extremity negative for DVT, right popliteal fossa fluid collection noted      Assessment & Plan   Assessment / Plan     Brief Patient Summary:  Wendy Dunn is a 63 y.o. female who is being evaluated for right-sided lumbar radiculopathy with right leg weakness    Active Hospital Problems:  Active Hospital Problems    Diagnosis     **Acute right lumbar radiculopathy      Plan:     Right-sided lumbar radiculopathy  MRI lumbar spine pending  Consult to neurosurgery  Consult to physical therapy  Multimodal analgesia with Norco, Robaxin and dexamethasone  Regular diet, n.p.o. after midnight    VTE Prophylaxis:  Mechanical VTE prophylaxis orders are present.        CODE STATUS:    Level Of Support Discussed With: Patient  Code Status (Patient has no pulse and is not breathing): CPR (Attempt to Resuscitate)  Medical Interventions (Patient has pulse or is breathing): Full Support    Admission Status:  I believe this patient meets observation status.    Electronically signed by NATASHA Celeste, 06/15/24, 2:14 PM EDT.        79 minutes has been spent by [facility] providers in the care of this patient while under observation status      I have worn appropriate PPE during this patient encounter, sanitized my hands both with entering and exiting patient's room.

## 2024-06-15 NOTE — LETTER
June 17, 2024     Patient: Wendy Dunn   YOB: 1960   Date of Visit: 6/15/2024       To Whom It May Concern:    It is my medical opinion that Wendy Dunn may return to full duty immediately with no restrictions on Thursday, June 20, 2024.           Sincerely,          NATASHA Jennings

## 2024-06-15 NOTE — PLAN OF CARE
Goal Outcome Evaluation:  Plan of Care Reviewed With: patient      Pt admitted to obs for right knee swelling and right leg pain. Pt stated rt knee started swelling and progressively got worse. Pt had MRI L spine awaiting results. Pt alert and orient times 4, NAD, up with assist standby, VSS. Awaiting neuros surgery consult. Pt updated on plan of care.   Progress: no change

## 2024-06-15 NOTE — ED PROVIDER NOTES
" EMERGENCY DEPARTMENT ENCOUNTER    Room Number:  13/13  PCP: Wojciech Garcia MD  Historian: Patient    I initially evaluated the patient at 11:50 AM    HPI:  Chief Complaint: Right leg pain  A complete HPI/ROS/PMH/PSH/SH/FH are unobtainable due to: Nothing  Context: Wendy Dunn is a 63 y.o. female with a medical history of osteoporosis, GERD who presents to the ED c/o acute right leg pain for the past month or so.  Patient has seen her PCP and has been seen here in the ED for the symptoms.  Pain is not getting any better with muscle relaxers and anti-inflammatories.  Earlier today, her right leg \"gave out\" while she was walking up the stairs.  Pain is primarily in her right thigh and behind her right knee.  Pain radiates into her right foot.  She reports intermittent tingling in her right foot.  She also reports having some discomfort in her right lower back.  Denies fever, chills, abdominal pain, loss of bowel/bladder control, saddle anesthesia, or previous back surgery.  She has her first appointment with neurosurgery on 6/27/2024 but is concerned about waiting that long to be seen as her symptoms seem to be getting worse.            PAST MEDICAL HISTORY  Active Ambulatory Problems     Diagnosis Date Noted    Osteoporosis 11/18/2015    Pica in adults 11/07/2017    Spinal stenosis in cervical region 12/08/2017    Degeneration of cervical intervertebral disc 12/08/2017    Colon cancer screening 12/27/2017    Pain of upper abdomen 12/01/2020    Gastroesophageal reflux disease 12/01/2020    Nausea and vomiting 12/01/2020    Weight loss 12/01/2020    Fatty liver 05/15/2024     Resolved Ambulatory Problems     Diagnosis Date Noted    No Resolved Ambulatory Problems     Past Medical History:   Diagnosis Date    GERD (gastroesophageal reflux disease)          PAST SURGICAL HISTORY  Past Surgical History:   Procedure Laterality Date    ANTERIOR CERVICAL DISCECTOMY W/ FUSION N/A 1/12/2018    Procedure: C4 to C6 " anterior cervical discectomy, fusion, and instrumentation;  Surgeon: Padilla Saleh MD;  Location: [facility] MAIN OR;  Service:     COLONOSCOPY N/A 3/5/2018    Procedure: COLONOSCOPY TO CECUM AND TI; COLD POLYPECTOMY;  Surgeon: Benita Barnes MD;  Location: [facility] ENDOSCOPY;  Service:     MAMMO BILATERAL  2015    normal    THYROIDECTOMY, PARTIAL      TOTAL ABDOMINAL HYSTERECTOMY  2003         FAMILY HISTORY  Family History   Problem Relation Age of Onset    Stroke Mother     Hypertension Mother     Stroke Father     Stroke Sister     Malig Hyperthermia Neg Hx          SOCIAL HISTORY  Social History     Socioeconomic History    Marital status:    Tobacco Use    Smoking status: Former     Current packs/day: 0.00     Types: Cigarettes     Quit date:      Years since quittin.4    Smokeless tobacco: Never   Substance and Sexual Activity    Alcohol use: No    Drug use: No    Sexual activity: Never         ALLERGIES  Patient has no known allergies.    REVIEW OF SYSTEMS  Review of Systems  Included in HPI  All systems reviewed and negative except for those discussed in HPI.      PHYSICAL EXAM  ED Triage Vitals [06/15/24 1147]   Temp Heart Rate Resp BP SpO2   98.9 °F (37.2 °C) 118 16 -- 98 %      Temp src Heart Rate Source Patient Position BP Location FiO2 (%)   Tympanic Monitor -- -- --       Physical Exam      GENERAL: Awake, alert, oriented x 3.  Well-developed, well-nourished female.  Resting comfortably in no acute distress  HENT: NCAT, nares patent  EYES: no scleral icterus  CV: regular rhythm, normal rate, equal radial pulses bilaterally  RESPIRATORY: normal effort, clear to auscultation bilaterally  ABDOMEN: soft, nondistended, nontender  MUSCULOSKELETAL: T/L-spine are nontender.  There is some tenderness over the right SI joint.  There is tenderness of the right distal thigh, right anterior knee, and right popliteal fossa.  No calf tenderness.  There is trace edema in the right lower  extremity  NEURO: Speech is normal.  No facial droop.  Normal light touch sensation in both lower extremities.  There is mild weakness with flexion of the right hip.  There is normal strength with left hip flexion, bilateral knee flexion/extension, bilateral ankle flexion/dorsiflexion, and bilateral extension of the great toes.  There is normal light touch sensation in both lower extremities.  No saddle anesthesia.  PSYCH:  calm, cooperative  SKIN: warm, dry    Vital signs and nursing notes reviewed.          LAB RESULTS  Recent Results (from the past 24 hour(s))   Basic Metabolic Panel    Collection Time: 06/15/24 12:42 PM    Specimen: Blood   Result Value Ref Range    Glucose 92 65 - 99 mg/dL    BUN 10 8 - 23 mg/dL    Creatinine 0.80 0.57 - 1.00 mg/dL    Sodium 139 136 - 145 mmol/L    Potassium 4.3 3.5 - 5.2 mmol/L    Chloride 104 98 - 107 mmol/L    CO2 29.0 22.0 - 29.0 mmol/L    Calcium 9.0 8.6 - 10.5 mg/dL    BUN/Creatinine Ratio 12.5 7.0 - 25.0    Anion Gap 6.0 5.0 - 15.0 mmol/L    eGFR 83.4 >60.0 mL/min/1.73   CBC Auto Differential    Collection Time: 06/15/24 12:42 PM    Specimen: Blood   Result Value Ref Range    WBC 7.19 3.40 - 10.80 10*3/mm3    RBC 4.76 3.77 - 5.28 10*6/mm3    Hemoglobin 14.6 12.0 - 15.9 g/dL    Hematocrit 44.5 34.0 - 46.6 %    MCV 93.5 79.0 - 97.0 fL    MCH 30.7 26.6 - 33.0 pg    MCHC 32.8 31.5 - 35.7 g/dL    RDW 13.4 12.3 - 15.4 %    RDW-SD 46.2 37.0 - 54.0 fl    MPV 9.4 6.0 - 12.0 fL    Platelets 275 140 - 450 10*3/mm3    Neutrophil % 48.7 42.7 - 76.0 %    Lymphocyte % 38.7 19.6 - 45.3 %    Monocyte % 10.4 5.0 - 12.0 %    Eosinophil % 1.1 0.3 - 6.2 %    Basophil % 0.4 0.0 - 1.5 %    Immature Grans % 0.7 (H) 0.0 - 0.5 %    Neutrophils, Absolute 3.50 1.70 - 7.00 10*3/mm3    Lymphocytes, Absolute 2.78 0.70 - 3.10 10*3/mm3    Monocytes, Absolute 0.75 0.10 - 0.90 10*3/mm3    Eosinophils, Absolute 0.08 0.00 - 0.40 10*3/mm3    Basophils, Absolute 0.03 0.00 - 0.20 10*3/mm3    Immature Grans,  Absolute 0.05 0.00 - 0.05 10*3/mm3    nRBC 0.0 0.0 - 0.2 /100 WBC   Duplex Venous Lower Extremity - Right    Collection Time: 06/15/24  1:23 PM   Result Value Ref Range    Right Common Femoral Spont Y     Right Common Femoral Competent Y     Right Common Femoral Phasic Y     Right Common Femoral Compress C     Right Common Femoral Augment Y     Right Saphenofemoral Junction Compress C     Right Profunda Femoral Compress C     Right Proximal Femoral Compress C     Right Mid Femoral Spont Y     Right Mid Femoral Competent Y     Right Mid Femoral Phasic Y     Right Mid Femoral Compress C     Right Mid Femoral Augment Y     Right Distal Femoral Compress C     Right Popliteal Spont Y     Right Popliteal Competent Y     Right Popliteal Phasic Y     Right Popliteal Compress C     Right Popliteal Augment Y     Right Posterior Tibial Compress C     Right Gastronemius Compress C     Right Greater Saph AK Compress C     Right Greater Saph BK Compress C     Right Lesser Saph Compress C     Left Common Femoral Spont Y     Left Common Femoral Competent Y     Left Common Femoral Phasic Y     Left Common Femoral Compress C     Left Common Femoral Augment Y      CV VAS PRELIMINARY FINDINGS SCRIPTING 1.0     Right Peroneal Compress C     BH CV VAS POP FLUID COLLECTED 1.0        Ordered the above labs and reviewed the results.        RADIOLOGY  Duplex Venous Lower Extremity - Right    Result Date: 6/15/2024    Right popliteal fossa fluid collection.   Normal right lower extremity venous duplex scan.     XR Knee 1 or 2 View Right    Result Date: 6/15/2024  XR KNEE 1 OR 2 VW RIGHT-  INDICATIONS: Right knee pain.  TECHNIQUE: FRONTAL AND LATERAL VIEWS OF THE  COMPARISON: None available  FINDINGS:  Mild knee effusion. Mild degenerative spurring is present. No acute fracture, erosion, or dislocation is identified. If there is clinical suspicion for internal derangement of the knee, MRI could be considered for further evaluation.       As  described.    This report was finalized on 6/15/2024 1:51 PM by Dr. Haim Dwyer M.D on Workstation: [facility]       Ordered the above noted radiological studies. Reviewed by me in PACS.            PROCEDURES  Procedures        OUTPATIENT MEDICATION MANAGEMENT:  Current Facility-Administered Medications Ordered in Epic   Medication Dose Route Frequency Provider Last Rate Last Admin    acetaminophen (TYLENOL) tablet 650 mg  650 mg Oral Q4H PRN Becky Pimentel APRN        sennosides-docusate (PERICOLACE) 8.6-50 MG per tablet 2 tablet  2 tablet Oral BID PRN Becky Pimentel APRN        And    polyethylene glycol (MIRALAX) packet 17 g  17 g Oral Daily PRN Becky Pimentel APRN        And    bisacodyl (DULCOLAX) EC tablet 5 mg  5 mg Oral Daily PRN Becky Pimentel APRN        And    bisacodyl (DULCOLAX) suppository 10 mg  10 mg Rectal Daily PRN Becky Pimentel APRN        dexAMETHasone (DECADRON) injection 10 mg  10 mg Intravenous Once Zechariah Navarro MD        dexAMETHasone sodium phosphate injection 4 mg  4 mg Intravenous Q8H Becky Pimentel APRN        diazePAM (VALIUM) tablet 5 mg  5 mg Oral Once PRN Becky Pimentel APRN        HYDROcodone-acetaminophen (NORCO) 5-325 MG per tablet 1 tablet  1 tablet Oral Q6H PRN Becky Pimentel APRN        ketorolac (TORADOL) injection 15 mg  15 mg Intravenous Q6H PRN Becky Pimentel, NATASHA        nitroglycerin (NITROSTAT) SL tablet 0.4 mg  0.4 mg Sublingual Q5 Min PRN Becky Pimentel APRN        sodium chloride 0.9 % flush 10 mL  10 mL Intravenous PRN Zechariah Navarro MD        sodium chloride 0.9 % flush 10 mL  10 mL Intravenous Q12H Becky Pimentel APRN        sodium chloride 0.9 % flush 10 mL  10 mL Intravenous PRN Becky Pimentel APRN        sodium chloride 0.9 % infusion 40 mL  40 mL Intravenous PRN Becky Pimentel APRN         Current Outpatient Medications Ordered in Epic   Medication Sig Dispense Refill    baclofen (LIORESAL) 10 MG tablet Take 1 tablet by mouth 3 (Three) Times a Day As Needed  for Muscle Spasms. 20 tablet 0    Calcium Carbonate-Vit D-Min (CALCIUM 1200 PO) Take 1 tablet by mouth Daily.      Omega-3 Fatty Acids (fish oil) 1000 MG capsule capsule Take 1 capsule by mouth Daily With Breakfast.      rosuvastatin (Crestor) 10 MG tablet Take 1 tablet by mouth Daily. For cholesterol and labs due 3 mos (Patient taking differently: Take 1 tablet by mouth Daily.) 30 tablet 11    vitamin B-12 (CYANOCOBALAMIN) 500 MCG tablet Take 1 tablet by mouth Daily.      vitamin E 400 UNIT capsule Take 1 capsule by mouth Every Morning.             MEDICATIONS GIVEN IN ER  Medications   sodium chloride 0.9 % flush 10 mL (has no administration in time range)   dexAMETHasone (DECADRON) injection 10 mg (10 mg Intravenous Not Given 6/15/24 1238)   sodium chloride 0.9 % flush 10 mL (has no administration in time range)   sodium chloride 0.9 % flush 10 mL (has no administration in time range)   sodium chloride 0.9 % infusion 40 mL (has no administration in time range)   acetaminophen (TYLENOL) tablet 650 mg (has no administration in time range)   nitroglycerin (NITROSTAT) SL tablet 0.4 mg (has no administration in time range)   sennosides-docusate (PERICOLACE) 8.6-50 MG per tablet 2 tablet (has no administration in time range)     And   polyethylene glycol (MIRALAX) packet 17 g (has no administration in time range)     And   bisacodyl (DULCOLAX) EC tablet 5 mg (has no administration in time range)     And   bisacodyl (DULCOLAX) suppository 10 mg (has no administration in time range)   dexAMETHasone sodium phosphate injection 4 mg (has no administration in time range)   diazePAM (VALIUM) tablet 5 mg (has no administration in time range)   HYDROcodone-acetaminophen (NORCO) 5-325 MG per tablet 1 tablet (has no administration in time range)   ketorolac (TORADOL) injection 15 mg (has no administration in time range)   HYDROcodone-acetaminophen (NORCO) 7.5-325 MG per tablet 1 tablet (1 tablet Oral Given 6/15/24 1159)                    MEDICAL DECISION MAKING, PROGRESS, and CONSULTS    All labs have been independently reviewed by me.  All radiology studies have been reviewed by me and I have also reviewed the radiology report.   EKG's independently viewed and interpreted by me.  Discussion below represents my analysis of pertinent findings related to patient's condition, differential diagnosis, treatment plan and final disposition.      Additional sources:    - Discussed/ obtained information from independent historians:  at bedside    - External (non-ED) record review: Doppler ultrasound of the right leg was done in August 2022 and was normal.  She saw Dr. Garcia, her PCP, last month complaining of tingling in her right leg.  She was given prescriptions for diclofenac and a Medrol Dosepak.  She was then seen here in the ED on 6/4/2024 for acute low back pain and right leg pain.  She was diagnosed with sciatica.  CT of the lumbar spine showed multilevel degenerative disease.    -Prescription drug monitoring program review:     N/A    - Chronic or social conditions impacting patient care (Social Determinants of Health): None          Orders placed during this visit:  Orders Placed This Encounter   Procedures    XR Knee 1 or 2 View Right    MRI Lumbar Spine Without Contrast    Basic Metabolic Panel    CBC Auto Differential    CBC (No Diff)    Basic Metabolic Panel    Urinalysis With Culture If Indicated -    Diet: Regular/House; Fluid Consistency: Thin (IDDSI 0)    NPO Diet NPO Type: Strict NPO    Up With Assistance    Intake & Output    Weigh Patient    Oral Care    Vital Signs    Maintain IV Access    Telemetry - Place Orders & Notify Provider of Results When Patient Experiences Acute Chest Pain, Dysrhythmia or Respiratory Distress    May Be Off Telemetry for Tests    Place Sequential Compression Device    Maintain Sequential Compression Device    Code Status and Medical Interventions:    Inpatient Neurosurgery Consult     PT Consult: Eval & Treat Functional Mobility Below Baseline    Insert Peripheral IV    Insert Peripheral IV    Initiate ED Observation Status    CBC & Differential         Additional orders considered but not ordered:          Differential diagnosis includes, but is not limited to:    Lumbar radiculopathy, arthropathy, DVT      Independent interpretation of labs, radiology studies, and discussions with consultants:  ED Course as of 06/15/24 1423   Sat Elton 15, 2024   1149 Temp: 98.9 °F (37.2 °C) [WH]   1150 Heart Rate: 118 [WH]   1150 Resp: 16 [WH]   1150 SpO2: 98 % [WH]   1150 Device (Oxygen Therapy): room air [WH]   1201 Patient complains of right leg pain and tingling for the past month or so.  Her right leg gave out earlier today and she is reporting some weakness in her right hip.  On exam, there is mild weakness with right hip flexion.  There is tenderness over the right thigh and right knee.  Will obtain Doppler ultrasound and right knee x-rays for further evaluation although I suspect that her pain is due to lumbar radiculopathy.  She will be given Norco for pain.  Differential diagnosis includes but is not limited to, DVT, muscle strain, arthritis, lumbar radiculopathy [WH]   1229 Right knee x-rays personally interpreted by me.  My personal interpretation is: There is mild joint space narrowing of the lateral compartment.  No fracture.  No effusion. [WH]   1314 WBC: 7.19 [WH]   1314 Hemoglobin: 14.6 [WH]   1318 Glucose: 92 [WH]   1318 Creatinine: 0.80 [WH]   1342 Per the vascular tech, ultrasound of the right lower extremity is negative acute [WH]   1343 Test results and diagnoses were discussed with the patient.  Shared decision making was discussed and admission was recommended.  She is agreeable with this. [WH]   1345 Case discussed with LISSETT Pena, she agrees to admit the patient to Dr. Oscar.  Pertinent history, exam findings, test results, ED course, and diagnoses were discussed with her. [WH]   1351  MDM: Patient presented the ED complaining of right low back pain and right leg pain for the past month.  Recent CT lumbar spine showed multilevel degenerative disease.  Knee x-rays done today showed mild degenerative changes.  Doppler ultrasound was negative for DVT.  Patient has some mild weakness of the right hip.  Labs were unremarkable.  Patient does not have her first appointment with neurosurgery for another 2 weeks or so.  She will be admitted for further workup including an MRI of the lumbar spine. [WH]      ED Course User Index  [WH] Zechariah Navarro MD         COMPLEXITY OF CARE  The patient requires admission.      DIAGNOSIS  Final diagnoses:   Acute right-sided low back pain with right-sided sciatica         DISPOSITION  ADMISSION    Discussed treatment plan and reason for admission with pt/family and admitting physician.  Pt/family voiced understanding of the plan for admission for further testing/treatment as needed.               Latest Documented Vital Signs:  AS OF 14:23 EDT VITALS:    BP - (!) 160/101  HR - 81  TEMP - 98.9 °F (37.2 °C) (Tympanic)  O2 SATS - 94%            --    Please note that portions of this were completed with a voice recognition program.       Note Disclaimer: At [facility], we believe that sharing information builds trust and better relationships. You are receiving this note because you are receiving care at [facility] or recently visited. It is possible you will see health information before a provider has talked with you about it. This kind of information can be easy to misunderstand. To help you fully understand what it means for your health, we urge you to discuss this note with your provider.             Zechariah Navarro MD  06/15/24 5978

## 2024-06-15 NOTE — PROGRESS NOTES
Clinical Pharmacy Services: Medication History    Wendy Dunn is a 63 y.o. female presenting to [facility] for   Chief Complaint   Patient presents with    Leg Swelling       She  has a past medical history of GERD (gastroesophageal reflux disease), Nausea and vomiting (12/01/2020), and Osteoporosis.    Allergies as of 06/15/2024    (No Known Allergies)       Medication information was obtained from: Patient  Pharmacy and Phone Number:     Prior to Admission Medications       Prescriptions Last Dose Informant Patient Reported? Taking?    baclofen (LIORESAL) 10 MG tablet 6/14/2024 Self No Yes    Take 1 tablet by mouth 3 (Three) Times a Day As Needed for Muscle Spasms.    Calcium Carbonate-Vit D-Min (CALCIUM 1200 PO) 6/14/2024 Self Yes Yes    Take 1 tablet by mouth Daily.    Omega-3 Fatty Acids (fish oil) 1000 MG capsule capsule 6/14/2024 Self Yes Yes    Take 1 capsule by mouth Daily With Breakfast.    rosuvastatin (Crestor) 10 MG tablet 6/14/2024 Self No Yes    Take 1 tablet by mouth Daily. For cholesterol and labs due 3 mos    Patient taking differently:  Take 1 tablet by mouth Daily.    vitamin B-12 (CYANOCOBALAMIN) 500 MCG tablet 6/14/2024 Self Yes Yes    Take 1 tablet by mouth Daily.    vitamin E 400 UNIT capsule 6/14/2024 Self Yes Yes    Take 1 capsule by mouth Every Morning.              Medication notes: Patient reports the only prescription medications she takes is baclofen 10mg which she took 2 tablets yesterday and then rosuvastatin 10mg QD.     This medication list is complete to the best of my knowledge as of 6/15/2024    Please call if questions.    Neel Cabrera   Pharmacy Intern   [phone]    6/15/2024 14:00 EDT

## 2024-06-16 LAB
ANION GAP SERPL CALCULATED.3IONS-SCNC: 8.7 MMOL/L (ref 5–15)
BUN SERPL-MCNC: 12 MG/DL (ref 8–23)
BUN/CREAT SERPL: 16.9 (ref 7–25)
CALCIUM SPEC-SCNC: 9.5 MG/DL (ref 8.6–10.5)
CHLORIDE SERPL-SCNC: 105 MMOL/L (ref 98–107)
CO2 SERPL-SCNC: 24.3 MMOL/L (ref 22–29)
CREAT SERPL-MCNC: 0.71 MG/DL (ref 0.57–1)
DEPRECATED RDW RBC AUTO: 44.5 FL (ref 37–54)
EGFRCR SERPLBLD CKD-EPI 2021: 96.3 ML/MIN/1.73
ERYTHROCYTE [DISTWIDTH] IN BLOOD BY AUTOMATED COUNT: 13.1 % (ref 12.3–15.4)
GLUCOSE SERPL-MCNC: 140 MG/DL (ref 65–99)
HCT VFR BLD AUTO: 46.7 % (ref 34–46.6)
HGB BLD-MCNC: 15.5 G/DL (ref 12–15.9)
MCH RBC QN AUTO: 30.8 PG (ref 26.6–33)
MCHC RBC AUTO-ENTMCNC: 33.2 G/DL (ref 31.5–35.7)
MCV RBC AUTO: 92.8 FL (ref 79–97)
PLATELET # BLD AUTO: 274 10*3/MM3 (ref 140–450)
PMV BLD AUTO: 9.6 FL (ref 6–12)
POTASSIUM SERPL-SCNC: 4.4 MMOL/L (ref 3.5–5.2)
RBC # BLD AUTO: 5.03 10*6/MM3 (ref 3.77–5.28)
SODIUM SERPL-SCNC: 138 MMOL/L (ref 136–145)
WBC NRBC COR # BLD AUTO: 5.53 10*3/MM3 (ref 3.4–10.8)

## 2024-06-16 PROCEDURE — 99221 1ST HOSP IP/OBS SF/LOW 40: CPT

## 2024-06-16 PROCEDURE — 97162 PT EVAL MOD COMPLEX 30 MIN: CPT

## 2024-06-16 PROCEDURE — 80048 BASIC METABOLIC PNL TOTAL CA: CPT | Performed by: NURSE PRACTITIONER

## 2024-06-16 PROCEDURE — 85027 COMPLETE CBC AUTOMATED: CPT | Performed by: NURSE PRACTITIONER

## 2024-06-16 PROCEDURE — 25010000002 DEXAMETHASONE SODIUM PHOSPHATE 20 MG/5ML SOLUTION: Performed by: NURSE PRACTITIONER

## 2024-06-16 PROCEDURE — 96376 TX/PRO/DX INJ SAME DRUG ADON: CPT

## 2024-06-16 PROCEDURE — G0378 HOSPITAL OBSERVATION PER HR: HCPCS

## 2024-06-16 RX ADMIN — METHOCARBAMOL TABLETS 750 MG: 750 TABLET, COATED ORAL at 23:18

## 2024-06-16 RX ADMIN — Medication 10 ML: at 10:14

## 2024-06-16 RX ADMIN — METHOCARBAMOL TABLETS 750 MG: 750 TABLET, COATED ORAL at 18:03

## 2024-06-16 RX ADMIN — METHOCARBAMOL TABLETS 750 MG: 750 TABLET, COATED ORAL at 10:13

## 2024-06-16 RX ADMIN — DEXAMETHASONE SODIUM PHOSPHATE 4 MG: 4 INJECTION, SOLUTION INTRAMUSCULAR; INTRAVENOUS at 20:36

## 2024-06-16 RX ADMIN — ROSUVASTATIN CALCIUM 10 MG: 20 TABLET, FILM COATED ORAL at 20:36

## 2024-06-16 RX ADMIN — DEXAMETHASONE SODIUM PHOSPHATE 4 MG: 4 INJECTION, SOLUTION INTRAMUSCULAR; INTRAVENOUS at 13:17

## 2024-06-16 RX ADMIN — Medication 500 MCG: at 10:13

## 2024-06-16 RX ADMIN — LIDOCAINE 1 PATCH: 4 PATCH TOPICAL at 10:14

## 2024-06-16 RX ADMIN — DEXAMETHASONE SODIUM PHOSPHATE 4 MG: 4 INJECTION, SOLUTION INTRAMUSCULAR; INTRAVENOUS at 03:38

## 2024-06-16 NOTE — PROGRESS NOTES
"The LISSETT and I have discussed this patient's history, physical exam and treatment plan.  I provided a substantive portion of the care of this patient.  I have reviewed the documentation and personally had a face to face interaction with the patient and personally performed the physical exam, in its entirety.  I affirm the documentation and agree with the treatment and plan.  The following describes my personal findings.  SHARED VISIT: This visit was performed by BOTH a physician and an APC. The substantive portion of the medical decision making was performed by this attesting physician who made or approved the management plan and takes responsibility for patient management. All studies in the APC note (if performed) were independently interpreted by me.       The patient is admitted to the observation unit for further evaluation of back pain radiating down her right leg patient reports worse than usual for her over the past 5 days.  Patient reports she pushed a heavy object with her leg with increasing pain since that time earlier this week.  Patient reports her right leg \"gave out\" prior to coming to the hospital while walking upstairs.  Patient reports the pain radiates from her right buttock through her thigh down the lateral aspect of her right lower leg to her foot    Comprehensive Physical exam:  Patient is nontoxic appearing oriented, conversant, awake, alert  HEENT: normocephalic, atraumatic  Neck: no goiter, no pain with ROM  Pulmonary: Nontachypneic  cardiovascular: Nontachycardic  Abdomen: Soft, nontender  musculoskeletal: Good range of motion x 4, sensation intact bilateral lower extremity, reflexes intact patellar and Achilles, patient reports persistent discomfort with straight leg raise right leg, able to ambulate independently with PT today  Neuro/psychiatric:calm, appropriate, cooperative  Skin:warm, dry      Plan:  Right-sided lumbar radiculopathy  MRI lumbar spine shows degenerative changes in the " lumbar spine  Consult to neurosurgery with recommendation for STELLA tomorrow  Consult to physical therapy, patient ambulates independently in the observation unit with PT today  Multimodal analgesia with Norco, Robaxin and dexamethasone  Regular diet, n.p.o. after midnight

## 2024-06-16 NOTE — PLAN OF CARE
Goal Outcome Evaluation:         Patient resting well. No signs of acute distress. Vital signs stable. Alert & oriented x 4. Neurosurgery and PT consulted. MRI completed and resulted. Patient reports pain is much better. Call light within reach. Will monitor.

## 2024-06-16 NOTE — PLAN OF CARE
Goal Outcome Evaluation:  Plan of Care Reviewed With: patient         Patient is a 63 y.o. female admitted to [facility] for acute R lumbar radiculopathy on 6/15/2024. Pt reports inc'd weakness in RLE, feeling like her knee is going to buckle. Denies back pain at this time. Patient is (I) at baseline and lives with her . She states there are 3 flights to enter and exit her home, she has been performing slowly 1 step at a time with good mechanics. Today, patient performed bed mobility with Lissett, required Lissett for transfers, and ambulated 300'; 200' with no AD and CGA and additional 100' with RW and SV. Pt feels safer with RW and demo's less antalgic gait pattern. Pt was able to practice 8 steps today with SV, demo's good mechanics. No functional deficits noted. Rec D/C home with assist and follow up with OPPT to address RLE pain and weakness.       Anticipated Discharge Disposition (PT): home with assist, home with outpatient therapy services

## 2024-06-16 NOTE — CONSULTS
Neurosurgery Consultation      Patient: Wendy Dunn    Sex: female    YOB: 1960    Date of Admission: 6/15/2024 11:46 AM    Admitting Dx: Acute right lumbar radiculopathy [M54.16]  Acute right-sided low back pain with right-sided sciatica [M54.41]    Reason for Consult: Low back pain and radiculopathy      Subjective     CC: Low back and leg pain x 1 month    HPI:  Patient is a 63 y.o. female with a PMH significant for osteoporosis who presented to the ED yesterday with complaints of low back and right leg pain that started approximately 1 month ago.  She denies injury or inciting event.  Pain is throughout her lower back and radiates down her right leg with associated numbness and tingling.  The symptoms are worse with weightbearing on her right leg and better with recumbency.  She is having tremendous difficulty walking and using stairs as a result.  Patient denies lower extremity weakness, bowel/bladder dysfunction, and saddle anesthesia.      PMH:  Past Medical History:   Diagnosis Date    GERD (gastroesophageal reflux disease)     Nausea and vomiting 12/01/2020    Osteoporosis          Current Facility-Administered Medications:     acetaminophen (TYLENOL) tablet 650 mg, 650 mg, Oral, Q4H PRN, Loren, Becky, APRN    sennosides-docusate (PERICOLACE) 8.6-50 MG per tablet 2 tablet, 2 tablet, Oral, BID PRN **AND** polyethylene glycol (MIRALAX) packet 17 g, 17 g, Oral, Daily PRN **AND** bisacodyl (DULCOLAX) EC tablet 5 mg, 5 mg, Oral, Daily PRN **AND** bisacodyl (DULCOLAX) suppository 10 mg, 10 mg, Rectal, Daily PRN, Herth, Becky, APRN    dexAMETHasone (DECADRON) injection 10 mg, 10 mg, Intravenous, Once, Herth, Becky, APRN    dexAMETHasone sodium phosphate injection 4 mg, 4 mg, Intravenous, Q8H, Herth, Becky, APRN, 4 mg at 06/16/24 0338    diazePAM (VALIUM) tablet 5 mg, 5 mg, Oral, Once PRN, Herta, Becky, APRN    HYDROcodone-acetaminophen (NORCO) 5-325 MG per tablet 1 tablet, 1 tablet, Oral, Q6H  PRN, Herth, Becky, APRN    ketorolac (TORADOL) injection 15 mg, 15 mg, Intravenous, Q6H PRN, Herth, Becky, APRN    Lidocaine 4 % 1 patch, 1 patch, Transdermal, Q24H, Herth, Becky, APRN, 1 patch at 06/15/24 1804    methocarbamol (ROBAXIN) tablet 750 mg, 750 mg, Oral, 4x Daily, Herth, Becky, APRN, 750 mg at 06/15/24 2138    nitroglycerin (NITROSTAT) SL tablet 0.4 mg, 0.4 mg, Sublingual, Q5 Min PRN, Herth, Becky, APRN    rosuvastatin (CRESTOR) tablet 10 mg, 10 mg, Oral, Daily, Herth, Becky, APRN, 10 mg at 06/15/24 2139    [COMPLETED] Insert Peripheral IV, , , Once **AND** sodium chloride 0.9 % flush 10 mL, 10 mL, Intravenous, PRN, Herth, Becky, APRN    sodium chloride 0.9 % flush 10 mL, 10 mL, Intravenous, Q12H, Herth, Becky, APRN, 10 mL at 06/15/24 2139    sodium chloride 0.9 % flush 10 mL, 10 mL, Intravenous, PRN, Herth, Becky, APRN    sodium chloride 0.9 % infusion 40 mL, 40 mL, Intravenous, PRN, Herth, Becky, APRN    vitamin B-12 (CYANOCOBALAMIN) tablet 500 mcg, 500 mcg, Oral, Daily, Herth, Becky, APRN, 500 mcg at 06/15/24 1804    No Known Allergies    Past Surgical History:   Procedure Laterality Date    ANTERIOR CERVICAL DISCECTOMY W/ FUSION N/A 1/12/2018    Procedure: C4 to C6 anterior cervical discectomy, fusion, and instrumentation;  Surgeon: Padilla Saleh MD;  Location: [facility] MAIN OR;  Service:     COLONOSCOPY N/A 3/5/2018    Procedure: COLONOSCOPY TO CECUM AND TI; COLD POLYPECTOMY;  Surgeon: Benita Barnes MD;  Location: [facility] ENDOSCOPY;  Service:     MAMMO BILATERAL  04/2015    normal    THYROIDECTOMY, PARTIAL      TOTAL ABDOMINAL HYSTERECTOMY  2003       Social History     Socioeconomic History    Marital status:    Tobacco Use    Smoking status: Every Day     Current packs/day: 0.50     Average packs/day: 0.5 packs/day for 0.5 years (0.2 ttl pk-yrs)     Types: Cigarettes     Start date: 2024     Last attempt to quit: 2015    Smokeless tobacco: Never   Vaping Use    Vaping status: Never Used    Substance and Sexual Activity    Alcohol use: No    Drug use: No    Sexual activity: Never       Family History   Problem Relation Age of Onset    Stroke Mother     Hypertension Mother     Stroke Father     Stroke Sister     Malig Hyperthermia Neg Hx          Current Medications:  Scheduled Meds:dexAMETHasone, 10 mg, Intravenous, Once  dexAMETHasone, 4 mg, Intravenous, Q8H  Lidocaine, 1 patch, Transdermal, Q24H  methocarbamol, 750 mg, Oral, 4x Daily  rosuvastatin, 10 mg, Oral, Daily  sodium chloride, 10 mL, Intravenous, Q12H  vitamin B-12, 500 mcg, Oral, Daily      Continuous Infusions:   PRN Meds:   acetaminophen    senna-docusate sodium **AND** polyethylene glycol **AND** bisacodyl **AND** bisacodyl    diazePAM    HYDROcodone-acetaminophen    ketorolac    nitroglycerin    [COMPLETED] Insert Peripheral IV **AND** sodium chloride    sodium chloride    sodium chloride    ROS: 14 point review of systems was completed and is negative except for what is noted in HPI      Objective     Vitals:    06/15/24 1916 06/15/24 2231 06/16/24 0340 06/16/24 0700   BP: 127/77 140/80 129/77 137/91   BP Location: Left arm Left arm Left arm Left arm   Patient Position: Lying Lying Lying Lying   Pulse: 86 77 65 68   Resp: 16 16 16 16   Temp: 98.1 °F (36.7 °C) 98.6 °F (37 °C) 97.7 °F (36.5 °C) 97.8 °F (36.6 °C)   TempSrc: Oral Oral Oral Oral   SpO2: 100% 100% 100%    Weight:       Height:           Physical exam  General  - awake, cooperative, in no acute distress  Cardiac  - RRR, no peripheral edema  Respiratory  - Normal respiratory rate and effort      NEUROLOGIC    MENTAL STATUS:  - A/O x3  MOTOR:  - CHIN symmetrically w/ full strength  REFLEXES:  - Brisk and symmetric throughout  - Negative clonus  SENSORY:  - Normal to touch and pinprick throughout  COORDINATION:  - No dysmetria  GAIT:  - Deferred            RESULTS REVIEW:  Results from last 7 days   Lab Units 06/16/24  0343 06/15/24  1242   WBC 10*3/mm3 5.53 7.19   HEMOGLOBIN  g/dL 15.5 14.6   HEMATOCRIT % 46.7* 44.5   PLATELETS 10*3/mm3 274 275        Results from last 7 days   Lab Units 06/16/24  0343 06/15/24  1242   SODIUM mmol/L 138 139   POTASSIUM mmol/L 4.4 4.3   CHLORIDE mmol/L 105 104   CO2 mmol/L 24.3 29.0   BUN mg/dL 12 10   CREATININE mg/dL 0.71 0.80   CALCIUM mg/dL 9.5 9.0   GLUCOSE mg/dL 140* 92           CT Lumbar Spine Without Contrast    Result Date: 6/5/2024  CT LUMBAR SPINE WITHOUT CONTRAST  HISTORY: Back pain, right radiculopathy for 1 month.  COMPARISON: None.  FINDINGS: There is mild-to-moderate loss of disc height and vacuum disc effect at L4-L5 as well as retrolisthesis of L4 upon L5 estimated to be 6 mm.  L1-L2: There is no evidence of a disc bulge or herniation.  L2-L3: A mild central disc bulge is present with no evidence of herniation. Mild facet degenerative disease is present bilaterally. There is mild and mild-to-moderate foraminal stenosis on the right and left respectively secondary to extension of a small disc osteophyte complex into the neural foramen and to congenitally shortened pedicles.  L3-L4: Mild facet degenerative disease is present bilaterally. A mild broad-based disc osteophyte complex is present which results in mild canal stenosis. There is likely moderate lateral recess narrowing bilaterally secondary to posterior element degenerative disease, broad-based disc osteophyte complex and the congenitally shortened pedicles. Mild foraminal stenosis is present bilaterally secondary to extension of a small disc osteophyte complex into the neural foramen.  L4-L5: A mild broad-based disc osteophyte complex is present which is slightly more prominent to the left. Lateral recess narrowing is present bilaterally secondary to the broad-based disc osteophyte complex, mild facet and ligamentum flavum hypertrophy and the retrolisthesis of L4 upon L5. Mild foraminal stenosis is present bilaterally, slightly more prominent on the left.  L5-S1: Transitional  anatomy is appreciated with what is being considered to be partial sacralization of L5. Mild facet degenerative disease is present bilaterally.      Impression: 1.  Transitional anatomy is appreciated with what is being considered to be partial sacralization of L5. Careful correlation with all imaging is required prior to any intervention given the presence of transitional anatomy. 2.  There is no evidence of a focal herniation. Multilevel degenerative disease involving the lumbar spine is noted including loss of disc height, vacuum disc effect and retrolisthesis at L4-L5. There is mild canal stenosis and likely moderate lateral recess narrowing bilaterally at L3-L4. Lateral recess narrowing is also present bilaterally at L4-L5. Lateral recess narrowing and spinal stenosis is accentuated by congenitally shortened pedicles. Further evaluation could be performed with a MRI examination of the lumbar spine as indicated.      Radiation dose reduction techniques were utilized, including automated exposure control and exposure modulation based on body size.   This report was finalized on 6/5/2024 11:00 AM by Dr. Rinku Mcclendon M.D on Workstation: [id number]     MRI Lumbar Spine Without Contrast    Result Date: 6/15/2024  STAT MRI OF THE LUMBAR SPINE ON 06/15/2024  CLINICAL HISTORY: Patient is a 63-year-old female patient who states earlier today that her right leg gave out while she was walking up the stairs and has right buttocks pain and right-sided lumbar radiculopathy.  TECHNIQUE: Sagittal T1, proton density and fat-suppressed T2-weighted images were obtained of the lumbar spine. In addition, axial T2-weighted images were obtained from T10 down to S2 and thin cut axial T1-weighted images were obtained from T10 to L3 and then angled through the interspaces from L3 to S1.  This is correlated to a lumbar spine CT on 06/04/2024, 11 days ago. There are no prior lumbar spine MRIs for comparison.  FINDINGS: This patient has  a transitional vertebra at the lumbosacral junction. For the purposes of this report, the transitional vertebra is labeled as a partially sacralized L5 vertebra with the last-inferiormost partially hydrated hypoplastic disc space labeled as the L5-S1 disc space and above the partially sacralized L5 vertebra are 4 non-rib-bearing vertebrae labeled as L1 through L4 and the lowermost rib-bearing vertebra is labeled as T12 and this is the same numbering system used on the prior lumbar spine CT 11 days on 06/04/2024.  The distal thoracic cord and the conus are normal in signal intensity. The conus terminates at the L1-2 interspace level which is normal.  At T12-L1, the posterior disc margin and facets are normal with no canal or foraminal narrowing.  At L1-2, there is anterior marginal endplate spurring. The posterior disc margin and facets are normal with no canal or foraminal narrowing.  At L2-3, there is mild bilateral facet overgrowth. The posterior disc margin is normal. There is no canal narrowing. There is mild facet spurring into the posterior foramina and minimal right and mild left foraminal narrowing.  At L3-4, there is mild-to-moderate bilateral facet overgrowth. The posterior disc margin is normal. There is essentially no canal or foraminal narrowing.  At L4-5, there is mild left and mild-to-moderate right facet overgrowth. There is disc space narrowing, degenerative endplate changes and a 3 mm retrolisthesis of L5 with respect to S1, a minimal diffuse posterior disc osteophyte complex but there is essentially no canal narrowing. There is slight narrowing of the lateral recesses that could potentially affect the traversing L5 nerve roots. There is some facet spurring into the posterior aspect of the right foramen and endplate spurring into the inferior right foramen and mild-to-moderate right foraminal narrowing. There is only minimal left foraminal narrowing.  At L5-S1, there is a hypoplastic disc space and  the posterior disc margin and facets are normal with no canal or lateral recess narrowing. There is some spurring into the anterior aspect of the neural foramina bilaterally with only minimal left and there is up to moderate right bony foraminal narrowing of the anterior aspect of the right foramen at L5-S1 that could affect the exiting right L5 nerve root.      Impression: 1. There is transitional anatomy at the lumbosacral junction and for the purposes of this report, the transitional vertebra is labeled as a partially sacralized L5 vertebra with the last-inferiormost partially hydrated hypoplastic disc space labeled as the L5-S1 disc space and above the partially sacralized L5 vertebra are 4 non-rib-bearing vertebrae labeled as L1 through L4 and the lowermost rib-bearing vertebra is labeled as T12 and this is the same numbering system that was used on the prior lumbar spine CT 11 days ago on 06/04/2024.  2. There is very mild lumbar spondylosis as described. While there is mild bilateral facet overgrowth at L2-3 and mild-to-moderate bilateral facet overgrowth at L3-4 and minimal right and mild left foraminal narrowing at L2-3, I see no additional canal or foraminal narrowing from T12 down to L4.  3. At L4-5, there is mild left and mild-to-moderate right facet overgrowth, disc space narrowing, degenerative endplate change and a 3 mm retrolisthesis of L4 with respect to L5 and a mild diffuse posterior disc osteophyte complex but there is no central canal narrowing. There is some mild bilateral lateral recess narrowing that could potentially affect the traversing L5 nerve roots.  4. The L5-S1 level is a hypoplastic disc space and there is no canal or lateral recess narrowing. There is no proximal foraminal narrowing but there is some spurring into the anterior aspect of the neural foramina bilaterally, right much more prominent than left. There is only minimal left and there is up to moderate bony foraminal  narrowing at the anterior aspect the right foramen that could potentially affect the exiting right L5 nerve root. This finding is better demonstrated on prior lumbar spine CT 06/04/2024.  This report was finalized on 6/15/2024 7:53 PM by Dr. Abiodun Torres M.D on Workstation: [id number]                 Assessment     MDM: This is a 63 y.o. female presenting with progressively worsening right-sided lumbosacral radiculopathy for the past month.  MRI shows transitional anatomy with grade 1 spondylolisthesis at L4-5 as well as moderate right-sided foraminal stenosis at L4-5 and L5-S1.  There is no high-grade canal stenosis.  Patient is neurologically intact on exam with no red flags for cauda equina syndrome.    No neurosurgical intervention indicated at this time.  Patient would likely benefit from outpatient physical therapy.  She feels she would have a hard time meaningfully engaging with physical therapy exercises due to the severity of her pain.  If unable to adequately control her pain prior to discharge, could consider an inpatient lumbar epidural steroid injection.  Will defer to the primary team for management of patient's pain.  She may follow-up with our outpatient neurosurgery clinic after undergoing physical therapy.  Patient is agreeable to this plan.      Plan     Degenerative disc disease, lumbosacral spine  Lumbar stenosis without neurogenic claudication  Acute lumbosacral radiculopathy  Acute low back pain  - Medical management and pain control per primary team  - Recommend outpatient physical therapy  - Could consider LESI if unable to adequately control pain prior to discharge  - Follow-up with outpatient neurosurgery after completing PT    Nothing further to offer from a neurosurgical perspective.  We will sign off at this time.  Please call with any questions or concerns.    I discussed my assessment and recommendations with Dr. Haresh Neff PA-C  6/16/2024  08:27 EDT      Part of  this note may be an electronic transcription/translation of spoken language to printed text using the Dragon Dictation System.

## 2024-06-16 NOTE — PROGRESS NOTES
ED OBSERVATION PROGRESS/DISCHARGE SUMMARY    Date of Admission: 6/15/2024   LOS: 0 days   PCP: Wojciech Garcia MD        Subjective     Hospital Outcome:   Wendy Dunn is a pleasant afebrile 63 y.o. black female with a past medical history of GERD and osteoporosis.      She presents to the Emergency Department at [facility] today with complaint of low back and right leg pain.  She has been admitted to the ED observation unit for further testing and evaluation.     Patient states that approximately 1 month ago she developed low back discomfort.  She denies any trauma or injury.  Patient reports that she is having pain and numbness that radiate down her right leg.  She has been seeing her primary care provider for this and was set up to be evaluated by the neurosurgery service.  Patient reports she came to the ER today because of worsening discomfort and from her right leg causing her weakness.     She denies any loss of bowel or bladder control, saddle anesthesia.  No fevers or chills.  At baseline she does not use a cane or a walker.     6/16: Patient was seen and evaluated by neurosurgery team this morning who recommends against any surgical intervention.  Advised patient may be of benefit for a lumbar epidural steroid injection    Seen by physical therapy service who recommend outpatient PT and home with assist. Patient reports she is having persistent back pain, agreeable to trial of hydrocodone/apap today and requests LESI to be performed tomorrow. Will keep her in obs today to see if the pain management clinic can help in the AM. Will make pt NPO at midnight.     ROS:  All systems reviewed negative except for what is mentioned above.    Objective   Physical Exam:  I have reviewed the vital signs.  Temp:  [97.7 °F (36.5 °C)-98.9 °F (37.2 °C)] 97.7 °F (36.5 °C)  Heart Rate:  [] 65  Resp:  [16-17] 16  BP: (127-160)/() 129/77  Constitutional: Awake, alert              Eyes: MCKENNA  sclerae anicteric, no conjunctival injection              HENT: NCAT, mucous membranes moist              Neck: Supple, no thyromegaly, no lymphadenopathy, trachea midline              Respiratory: Clear to auscultation bilaterally, nonlabored respirations               Cardiovascular: RRR, no murmurs, rubs, or gallops, palpable pedal pulses bilaterally              Gastrointestinal: Positive bowel sounds, soft, nontender, nondistended              Musculoskeletal: No bilateral ankle edema, no clubbing or cyanosis to extremities              Psychiatric: Appropriate affect, cooperative              Neurologic: Oriented x 3, strength symmetric in all extremities, Cranial Nerves grossly intact to confrontation, speech clear              Skin: No rashes     Results Review:    I have reviewed the labs, radiology results and diagnostic studies.    Results from last 7 days   Lab Units 06/16/24  0343   WBC 10*3/mm3 5.53   HEMOGLOBIN g/dL 15.5   HEMATOCRIT % 46.7*   PLATELETS 10*3/mm3 274     Results from last 7 days   Lab Units 06/16/24  0343 06/15/24  1242   SODIUM mmol/L 138 139   POTASSIUM mmol/L 4.4 4.3   CHLORIDE mmol/L 105 104   CO2 mmol/L 24.3 29.0   BUN mg/dL 12 10   CREATININE mg/dL 0.71 0.80   CALCIUM mg/dL 9.5 9.0   GLUCOSE mg/dL 140* 92     Imaging Results (Last 24 Hours)       Procedure Component Value Units Date/Time    MRI Lumbar Spine Without Contrast [[id number]] Collected: 06/15/24 1711     Updated: 06/15/24 1957    Narrative:      STAT MRI OF THE LUMBAR SPINE ON 06/15/2024     CLINICAL HISTORY: Patient is a 63-year-old female patient who states  earlier today that her right leg gave out while she was walking up the  stairs and has right buttocks pain and right-sided lumbar radiculopathy.     TECHNIQUE: Sagittal T1, proton density and fat-suppressed T2-weighted  images were obtained of the lumbar spine. In addition, axial T2-weighted  images were obtained from T10 down to S2 and thin cut axial  T1-weighted  images were obtained from T10 to L3 and then angled through the  interspaces from L3 to S1.     This is correlated to a lumbar spine CT on 06/04/2024, 11 days ago.  There are no prior lumbar spine MRIs for comparison.     FINDINGS: This patient has a transitional vertebra at the lumbosacral  junction. For the purposes of this report, the transitional vertebra is  labeled as a partially sacralized L5 vertebra with the last-inferiormost  partially hydrated hypoplastic disc space labeled as the L5-S1 disc  space and above the partially sacralized L5 vertebra are 4  non-rib-bearing vertebrae labeled as L1 through L4 and the lowermost  rib-bearing vertebra is labeled as T12 and this is the same numbering  system used on the prior lumbar spine CT 11 days on 06/04/2024.     The distal thoracic cord and the conus are normal in signal intensity.  The conus terminates at the L1-2 interspace level which is normal.     At T12-L1, the posterior disc margin and facets are normal with no canal  or foraminal narrowing.     At L1-2, there is anterior marginal endplate spurring. The posterior  disc margin and facets are normal with no canal or foraminal narrowing.     At L2-3, there is mild bilateral facet overgrowth. The posterior disc  margin is normal. There is no canal narrowing. There is mild facet  spurring into the posterior foramina and minimal right and mild left  foraminal narrowing.     At L3-4, there is mild-to-moderate bilateral facet overgrowth. The  posterior disc margin is normal. There is essentially no canal or  foraminal narrowing.     At L4-5, there is mild left and mild-to-moderate right facet overgrowth.  There is disc space narrowing, degenerative endplate changes and a 3 mm  retrolisthesis of L5 with respect to S1, a minimal diffuse posterior  disc osteophyte complex but there is essentially no canal narrowing.  There is slight narrowing of the lateral recesses that could potentially  affect the  traversing L5 nerve roots. There is some facet spurring into  the posterior aspect of the right foramen and endplate spurring into the  inferior right foramen and mild-to-moderate right foraminal narrowing.  There is only minimal left foraminal narrowing.     At L5-S1, there is a hypoplastic disc space and the posterior disc  margin and facets are normal with no canal or lateral recess narrowing.  There is some spurring into the anterior aspect of the neural foramina  bilaterally with only minimal left and there is up to moderate right  bony foraminal narrowing of the anterior aspect of the right foramen at  L5-S1 that could affect the exiting right L5 nerve root.        Impression:      1. There is transitional anatomy at the lumbosacral junction and for the  purposes of this report, the transitional vertebra is labeled as a  partially sacralized L5 vertebra with the last-inferiormost partially  hydrated hypoplastic disc space labeled as the L5-S1 disc space and  above the partially sacralized L5 vertebra are 4 non-rib-bearing  vertebrae labeled as L1 through L4 and the lowermost rib-bearing  vertebra is labeled as T12 and this is the same numbering system that  was used on the prior lumbar spine CT 11 days ago on 06/04/2024.     2. There is very mild lumbar spondylosis as described. While there is  mild bilateral facet overgrowth at L2-3 and mild-to-moderate bilateral  facet overgrowth at L3-4 and minimal right and mild left foraminal  narrowing at L2-3, I see no additional canal or foraminal narrowing from  T12 down to L4.     3. At L4-5, there is mild left and mild-to-moderate right facet  overgrowth, disc space narrowing, degenerative endplate change and a 3  mm retrolisthesis of L4 with respect to L5 and a mild diffuse posterior  disc osteophyte complex but there is no central canal narrowing. There  is some mild bilateral lateral recess narrowing that could potentially  affect the traversing L5 nerve roots.      4. The L5-S1 level is a hypoplastic disc space and there is no canal or  lateral recess narrowing. There is no proximal foraminal narrowing but  there is some spurring into the anterior aspect of the neural foramina  bilaterally, right much more prominent than left. There is only minimal  left and there is up to moderate bony foraminal narrowing at the  anterior aspect the right foramen that could potentially affect the  exiting right L5 nerve root. This finding is better demonstrated on  prior lumbar spine CT 06/04/2024.     This report was finalized on 6/15/2024 7:53 PM by Dr. Abiodun Torres M.D  on Workstation: [id number]       XR Knee 1 or 2 View Right [[id number]] Collected: 06/15/24 1349     Updated: 06/15/24 1354    Narrative:      XR KNEE 1 OR 2 VW RIGHT-     INDICATIONS: Right knee pain.     TECHNIQUE: FRONTAL AND LATERAL VIEWS OF THE     COMPARISON: None available     FINDINGS:     Mild knee effusion. Mild degenerative spurring is present. No acute  fracture, erosion, or dislocation is identified. If there is clinical  suspicion for internal derangement of the knee, MRI could be considered  for further evaluation.       Impression:         As described.           This report was finalized on 6/15/2024 1:51 PM by Dr. Haim Dwyer M.D on Workstation: [id number]               I have reviewed the medications.  ---------------------------------------------------------------------------------------------  Assessment & Plan   Assessment/Problem List    Acute right lumbar radiculopathy      Plan:  Right-sided lumbar radiculopathy  MRI lumbar spine shows degenerative changes in the lumbar spine  Consult to neurosurgery  Consult to physical therapy  Multimodal analgesia with Norco, Robaxin and dexamethasone  Regular diet, n.p.o. after midnight     VTE Prophylaxis:  Mechanical VTE prophylaxis orders are present.     Disposition: I anticipate patient will be dispositioned tomorrow      This note will serve as  a progress note    Nicole Lockett, NATASHA 06/16/24 05:23 EDT    I have worn appropriate PPE during this patient encounter, sanitized my hands both with entering and exiting patient's room.       55 minutes has been spent by [facility] providers in the care of this patient while under observation status

## 2024-06-16 NOTE — PLAN OF CARE
Goal Outcome Evaluation:  Plan of Care Reviewed With: patient           Outcome Evaluation: pt admitted with right knee swelling. LESI planned for AM. Neurosurgery on board.      Problem: Adult Inpatient Plan of Care  Goal: Plan of Care Review  Outcome: Ongoing, Progressing  Flowsheets (Taken 6/16/2024 1905)  Plan of Care Reviewed With: patient  Outcome Evaluation: pt admitted with right knee swelling. LESI planned for AM. Neurosurgery on board.  Goal: Patient-Specific Goal (Individualized)  Outcome: Ongoing, Progressing  Goal: Absence of Hospital-Acquired Illness or Injury  Outcome: Ongoing, Progressing  Intervention: Identify and Manage Fall Risk  Recent Flowsheet Documentation  Taken 6/16/2024 1640 by Bing Oseguera RN  Safety Promotion/Fall Prevention:   safety round/check completed   room organization consistent   lighting adjusted   nonskid shoes/slippers when out of bed   activity supervised   clutter free environment maintained  Taken 6/16/2024 1402 by Bing Oseguera RN  Safety Promotion/Fall Prevention:   safety round/check completed   room organization consistent   lighting adjusted   nonskid shoes/slippers when out of bed   activity supervised   clutter free environment maintained  Taken 6/16/2024 1230 by Bing Oseguera RN  Safety Promotion/Fall Prevention:   safety round/check completed   room organization consistent   lighting adjusted   nonskid shoes/slippers when out of bed   activity supervised   clutter free environment maintained  Taken 6/16/2024 1000 by Bing Oseguera RN  Safety Promotion/Fall Prevention:   safety round/check completed   room organization consistent   lighting adjusted   nonskid shoes/slippers when out of bed   activity supervised   clutter free environment maintained  Taken 6/16/2024 0800 by Bing Oseguera RN  Safety Promotion/Fall Prevention:   safety round/check completed   room organization consistent   lighting adjusted   nonskid shoes/slippers when out of bed   activity  supervised   clutter free environment maintained  Intervention: Prevent Skin Injury  Recent Flowsheet Documentation  Taken 6/16/2024 1640 by Bing Oseguera RN  Body Position: position changed independently  Taken 6/16/2024 1402 by Bing Oseguera RN  Body Position: position changed independently  Taken 6/16/2024 1230 by Bing Oseguera RN  Body Position: position changed independently  Taken 6/16/2024 1000 by Bing Oseguera RN  Body Position: position changed independently  Taken 6/16/2024 0800 by Bing Oseguera RN  Body Position: position changed independently  Skin Protection: adhesive use limited  Intervention: Prevent and Manage VTE (Venous Thromboembolism) Risk  Recent Flowsheet Documentation  Taken 6/16/2024 1640 by Bing Oseguera RN  Activity Management:   up ad corinne   activity encouraged  Taken 6/16/2024 1402 by Bing Oseguera RN  Activity Management:   up ad corinne   activity encouraged  Taken 6/16/2024 1230 by Bing Oseguera RN  Activity Management:   up ad corinne   activity encouraged  Taken 6/16/2024 1000 by Bing Oseguera RN  Activity Management:   up ad corinne   activity encouraged  Taken 6/16/2024 0800 by Bing Oseguera RN  Activity Management:   up ad corinne   activity encouraged  Range of Motion: active ROM (range of motion) encouraged  Intervention: Prevent Infection  Recent Flowsheet Documentation  Taken 6/16/2024 1640 by Bing Oseguera RN  Infection Prevention:   rest/sleep promoted   single patient room provided  Taken 6/16/2024 1402 by Bing Oseguera RN  Infection Prevention:   rest/sleep promoted   single patient room provided  Taken 6/16/2024 1230 by Bing Oseguera RN  Infection Prevention:   rest/sleep promoted   single patient room provided  Taken 6/16/2024 1000 by Bing Oseguera RN  Infection Prevention:   rest/sleep promoted   single patient room provided  Taken 6/16/2024 0800 by Bing Oseguera RN  Infection Prevention:   rest/sleep promoted   single patient room provided  Goal: Optimal Comfort and Wellbeing  Outcome:  Ongoing, Progressing  Intervention: Provide Person-Centered Care  Recent Flowsheet Documentation  Taken 6/16/2024 0800 by Bing Oseguera RN  Trust Relationship/Rapport:   care explained   thoughts/feelings acknowledged   questions answered   empathic listening provided  Goal: Readiness for Transition of Care  Outcome: Ongoing, Progressing     Problem: Pain Acute  Goal: Acceptable Pain Control and Functional Ability  Outcome: Ongoing, Progressing  Intervention: Prevent or Manage Pain  Recent Flowsheet Documentation  Taken 6/16/2024 1640 by Bing Oseguera RN  Medication Review/Management: medications reviewed  Taken 6/16/2024 1402 by Bing Oseguera RN  Medication Review/Management: medications reviewed  Taken 6/16/2024 1230 by Bing Oseguera RN  Medication Review/Management: medications reviewed  Taken 6/16/2024 1000 by Bing Oseguera RN  Medication Review/Management: medications reviewed  Taken 6/16/2024 0800 by Bing Oseguera RN  Medication Review/Management: medications reviewed  Intervention: Optimize Psychosocial Wellbeing  Recent Flowsheet Documentation  Taken 6/16/2024 0800 by iBng Oseguera RN  Supportive Measures: active listening utilized  Diversional Activities:   television   smartphone

## 2024-06-16 NOTE — THERAPY EVALUATION
Patient Name: Wendy Dunn  : 1960    MRN: [id number]                              Today's Date: 2024       Admit Date: 6/15/2024    Visit Dx:     ICD-10-CM ICD-9-CM   1. Acute right-sided low back pain with right-sided sciatica  M54.41 724.2     724.3     Patient Active Problem List   Diagnosis    Osteoporosis    Pica in adults    Spinal stenosis in cervical region    Degeneration of cervical intervertebral disc    Colon cancer screening    Pain of upper abdomen    Gastroesophageal reflux disease    Nausea and vomiting    Weight loss    Fatty liver    Acute right lumbar radiculopathy     Past Medical History:   Diagnosis Date    GERD (gastroesophageal reflux disease)     Nausea and vomiting 2020    Osteoporosis      Past Surgical History:   Procedure Laterality Date    ANTERIOR CERVICAL DISCECTOMY W/ FUSION N/A 2018    Procedure: C4 to C6 anterior cervical discectomy, fusion, and instrumentation;  Surgeon: Padilla Saleh MD;  Location: [facility] OR;  Service:     COLONOSCOPY N/A 3/5/2018    Procedure: COLONOSCOPY TO CECUM AND TI; COLD POLYPECTOMY;  Surgeon: Benita Barnes MD;  Location: [facility] ENDOSCOPY;  Service:     MAMMO BILATERAL  2015    normal    THYROIDECTOMY, PARTIAL      TOTAL ABDOMINAL HYSTERECTOMY        General Information       Row Name 24 1120          Physical Therapy Time and Intention    Document Type evaluation  -DB     Mode of Treatment individual therapy;physical therapy  -DB       Row Name 24 1120          General Information    Patient Profile Reviewed yes  -DB     Prior Level of Function independent:  -DB     Existing Precautions/Restrictions fall  -DB     Barriers to Rehab none identified  -DB       Row Name 24 1120          Living Environment    People in Home spouse  -DB       Row Name 24 1120          Home Main Entrance    Number of Stairs, Main Entrance other (see comments)  3 flights of stairs  -DB     Stair Railings, Main  Entrance railings safe and in good condition  -DB       Row Name 06/16/24 1120          Stairs Within Home, Primary    Number of Stairs, Within Home, Primary none  -DB       Row Name 06/16/24 1120          Cognition    Orientation Status (Cognition) oriented x 4  -DB       Row Name 06/16/24 1120          Safety Issues, Functional Mobility    Impairments Affecting Function (Mobility) balance;strength;endurance/activity tolerance;pain  -DB               User Key  (r) = Recorded By, (t) = Taken By, (c) = Cosigned By      Initials Name Provider Type    DB Nicole Allison PT Physical Therapist                   Mobility       Row Name 06/16/24 1120          Bed Mobility    Bed Mobility supine-sit;sit-supine  -DB     Supine-Sit [place] (Bed Mobility) modified independence  -DB     Sit-Supine [place] (Bed Mobility) modified independence  -DB     Assistive Device (Bed Mobility) bed rails;head of bed elevated  -DB       Row Name 06/16/24 1120          Sit-Stand Transfer    Sit-Stand [place] (Transfers) independent  -DB       Row Name 06/16/24 1120          Gait/Stairs (Locomotion)    [place] Level (Gait) supervision;contact guard  -DB     Assistive Device (Gait) walker, front-wheeled  -DB     Distance in Feet (Gait) 300  -DB     Deviations/Abnormal Patterns (Gait) antalgic;salvador decreased  -DB     Bilateral Gait Deviations forward flexed posture  -DB     [place] Level (Stairs) supervision;verbal cues  -DB     Handrail Location (Stairs) both sides  -DB     Number of Steps (Stairs) 8  -DB     Ascending Technique (Stairs) step-to-step  -DB     Descending Technique (Stairs) step-to-step  -DB     Comment, (Gait/Stairs) 200' without AD and CGA, 100' with RW and SV  -DB               User Key  (r) = Recorded By, (t) = Taken By, (c) = Cosigned By      Initials Name Provider Type    DB Nicole Allison PT Physical Therapist                   Obj/Interventions       Row Name 06/16/24 1121          Range  of Motion Comprehensive    General Range of Motion no range of motion deficits identified  -DB       Row Name 06/16/24 1121          Strength Comprehensive (MMT)    Comment, General Manual Muscle Testing (MMT) Assessment RLE weakness knee ext  -DB       Row Name 06/16/24 1121          Balance    Balance Assessment sitting static balance;sitting dynamic balance;standing static balance;standing dynamic balance  -DB     Static Sitting Balance independent  -DB     Dynamic Sitting Balance independent  -DB     Position, Sitting Balance unsupported;sitting edge of bed  -DB     Static Standing Balance supervision  -DB     Dynamic Standing Balance supervision  -DB     Position/Device Used, Standing Balance supported;walker, front-wheeled  -DB     Balance Interventions sitting;standing;sit to stand  -DB               User Key  (r) = Recorded By, (t) = Taken By, (c) = Cosigned By      Initials Name Provider Type    Nicole Huffman PT Physical Therapist                   Goals/Plan    No documentation.                  Clinical Impression       Row Name 06/16/24 1122          Pain    Pain Intervention(s) Ambulation/increased activity;Repositioned  -DB       Row Name 06/16/24 1122          Plan of Care Review    Plan of Care Reviewed With patient  -DB     Outcome Evaluation Patient is a 63 y.o. female admitted to [facility] for acute R lumbar radiculopathy on 6/15/2024. Pt reports inc'd weakness in RLE, feeling like her knee is going to buckle. Denies back pain at this time. Patient is (I) at baseline and lives with her . She states there are 3 flights to enter and exit her home, she has been performing slowly 1 step at a time with good mechanics. Today, patient performed bed mobility with Lissett, required Lissett for transfers, and ambulated 300'; 200' with no AD and CGA and additional 100' with RW and SV. Pt feels safer with RW and demo's less antalgic gait pattern. Pt was able to practice 8 steps today with SV, demo's good  mechanics. No functional deficits noted. Rec D/C home with assist and follow up with OPPT to address RLE pain and weakness.  -DB       Row Name 06/16/24 1122          Therapy Assessment/Plan (PT)    Criteria for Skilled Interventions Met (PT) no  -DB     Therapy Frequency (PT) evaluation only  -DB       Row Name 06/16/24 1122          Vital Signs    O2 Delivery Pre Treatment room air  -DB     O2 Delivery Intra Treatment room air  -DB     O2 Delivery Post Treatment room air  -DB     Pre Patient Position Supine  -DB     Intra Patient Position Standing  -DB     Post Patient Position Supine  -DB       Row Name 06/16/24 1122          Positioning and Restraints    Pre-Treatment Position in bed  -DB     Post Treatment Position bed  -DB     In Bed supine;call light within reach;encouraged to call for assist  -DB               User Key  (r) = Recorded By, (t) = Taken By, (c) = Cosigned By      Initials Name Provider Type    Nicole Huffman, PT Physical Therapist                   Outcome Measures       Row Name 06/16/24 1123 06/16/24 0030       How much help from another person do you currently need...    Turning from your back to your side while in flat bed without using bedrails? 4  -DB 4  -MD    Moving from lying on back to sitting on the side of a flat bed without bedrails? 4  -DB 4  -MD    Moving to and from a bed to a chair (including a wheelchair)? 4  -DB 4  -MD    Standing up from a chair using your arms (e.g., wheelchair, bedside chair)? 4  -DB 4  -MD    Climbing 3-5 steps with a railing? 4  -DB 4  -MD    To walk in hospital room? 4  -DB 4  -MD    AM-PAC 6 Clicks Score (PT) 24  -DB 24  -MD    Highest Level of Mobility Goal 8 --> Walked 250 feet or more  -DB 8 --> Walked 250 feet or more  -MD      Row Name 06/16/24 1123          Functional Assessment    Outcome Measure Options AM-PAC 6 Clicks Basic Mobility (PT)  -DB               User Key  (r) = Recorded By, (t) = Taken By, (c) = Cosigned By      Initials Name  Provider Type    Cass Maciel, RN Registered Nurse    Nicole Huffman, PT Physical Therapist                                 Physical Therapy Education       Title: PT OT SLP Therapies (Done)       Topic: Physical Therapy (Done)       Point: Mobility training (Done)       Learning Progress Summary             Patient Acceptance, E, VU by DB at 6/16/2024 1124                         Point: Home exercise program (Done)       Learning Progress Summary             Patient Acceptance, E, VU by DB at 6/16/2024 1124                         Point: Body mechanics (Done)       Learning Progress Summary             Patient Acceptance, E, VU by DB at 6/16/2024 1124                         Point: Precautions (Done)       Learning Progress Summary             Patient Acceptance, E, VU by DB at 6/16/2024 1124                                         User Key       Initials Effective Dates Name Provider Type Discipline    ENRIQUE 06/16/21 -  Nicole Allison PT Physical Therapist PT                  PT Recommendation and Plan     Plan of Care Reviewed With: patient  Outcome Evaluation: Patient is a 63 y.o. female admitted to [facility] for acute R lumbar radiculopathy on 6/15/2024. Pt reports inc'd weakness in RLE, feeling like her knee is going to buckle. Denies back pain at this time. Patient is (I) at baseline and lives with her . She states there are 3 flights to enter and exit her home, she has been performing slowly 1 step at a time with good mechanics. Today, patient performed bed mobility with Lissett, required Lissett for transfers, and ambulated 300'; 200' with no AD and CGA and additional 100' with RW and SV. Pt feels safer with RW and demo's less antalgic gait pattern. Pt was able to practice 8 steps today with SV, demo's good mechanics. No functional deficits noted. Rec D/C home with assist and follow up with OPPT to address RLE pain and weakness.     Time Calculation:         PT Charges       Row Name 06/16/24 1123              Time Calculation    Start Time 1020  -DB      Stop Time 1032  -DB      Time Calculation (min) 12 min  -DB      PT Received On 06/16/24  -DB         Time Calculation- PT    Total Timed Code Minutes- PT 0 minute(s)  -DB                User Key  (r) = Recorded By, (t) = Taken By, (c) = Cosigned By      Initials Name Provider Type    DB Nicole Allison, PT Physical Therapist                  Therapy Charges for Today       Code Description Service Date Service Provider Modifiers Qty    22600016031 HC PT EVAL MOD COMPLEXITY 3 6/16/2024 Nicole Allison, PT GP 1            PT G-Codes  Outcome Measure Options: AM-PAC 6 Clicks Basic Mobility (PT)  AM-PAC 6 Clicks Score (PT): 24  PT Discharge Summary  Anticipated Discharge Disposition (PT): home with assist, home with outpatient therapy services    Nicole Allison, PT  6/16/2024

## 2024-06-17 ENCOUNTER — READMISSION MANAGEMENT (OUTPATIENT)
Dept: CALL CENTER | Facility: HOSPITAL | Age: 64
End: 2024-06-17
Payer: COMMERCIAL

## 2024-06-17 ENCOUNTER — APPOINTMENT (OUTPATIENT)
Dept: PAIN MEDICINE | Facility: HOSPITAL | Age: 64
End: 2024-06-17
Payer: COMMERCIAL

## 2024-06-17 ENCOUNTER — ANESTHESIA (OUTPATIENT)
Dept: PAIN MEDICINE | Facility: HOSPITAL | Age: 64
End: 2024-06-17
Payer: COMMERCIAL

## 2024-06-17 ENCOUNTER — ANESTHESIA EVENT (OUTPATIENT)
Dept: PAIN MEDICINE | Facility: HOSPITAL | Age: 64
End: 2024-06-17
Payer: COMMERCIAL

## 2024-06-17 ENCOUNTER — APPOINTMENT (OUTPATIENT)
Dept: GENERAL RADIOLOGY | Facility: HOSPITAL | Age: 64
End: 2024-06-17
Payer: COMMERCIAL

## 2024-06-17 VITALS
HEIGHT: 63 IN | HEART RATE: 77 BPM | WEIGHT: 184.1 LBS | OXYGEN SATURATION: 97 % | SYSTOLIC BLOOD PRESSURE: 138 MMHG | DIASTOLIC BLOOD PRESSURE: 78 MMHG | TEMPERATURE: 97.5 F | BODY MASS INDEX: 32.62 KG/M2 | RESPIRATION RATE: 16 BRPM

## 2024-06-17 PROCEDURE — 25010000002 METHYLPREDNISOLONE PER 80 MG: Performed by: ANESTHESIOLOGY

## 2024-06-17 PROCEDURE — 25010000002 FENTANYL CITRATE (PF) 50 MCG/ML SOLUTION: Performed by: ANESTHESIOLOGY

## 2024-06-17 PROCEDURE — 25010000002 MIDAZOLAM PER 1 MG: Performed by: ANESTHESIOLOGY

## 2024-06-17 PROCEDURE — 25010000002 DEXAMETHASONE SODIUM PHOSPHATE 20 MG/5ML SOLUTION: Performed by: NURSE PRACTITIONER

## 2024-06-17 PROCEDURE — 25510000001 IOPAMIDOL 41 % SOLUTION: Performed by: ANESTHESIOLOGY

## 2024-06-17 PROCEDURE — G0378 HOSPITAL OBSERVATION PER HR: HCPCS

## 2024-06-17 PROCEDURE — 96376 TX/PRO/DX INJ SAME DRUG ADON: CPT

## 2024-06-17 PROCEDURE — 77003 FLUOROGUIDE FOR SPINE INJECT: CPT

## 2024-06-17 RX ORDER — LIDOCAINE 4 G/G
1 PATCH TOPICAL
Qty: 10 PATCH | Refills: 0 | Status: SHIPPED | OUTPATIENT
Start: 2024-06-17

## 2024-06-17 RX ORDER — MIDAZOLAM HYDROCHLORIDE 1 MG/ML
1 INJECTION INTRAMUSCULAR; INTRAVENOUS ONCE AS NEEDED
Status: COMPLETED | OUTPATIENT
Start: 2024-06-17 | End: 2024-06-17

## 2024-06-17 RX ORDER — FAMOTIDINE 20 MG/1
20 TABLET, FILM COATED ORAL 2 TIMES DAILY
Qty: 10 TABLET | Refills: 0 | Status: SHIPPED | OUTPATIENT
Start: 2024-06-17

## 2024-06-17 RX ORDER — LIDOCAINE HYDROCHLORIDE 10 MG/ML
1 INJECTION, SOLUTION INFILTRATION; PERINEURAL ONCE
Status: DISCONTINUED | OUTPATIENT
Start: 2024-06-17 | End: 2024-06-17 | Stop reason: HOSPADM

## 2024-06-17 RX ORDER — METHYLPREDNISOLONE ACETATE 80 MG/ML
80 INJECTION, SUSPENSION INTRA-ARTICULAR; INTRALESIONAL; INTRAMUSCULAR; SOFT TISSUE ONCE
Status: COMPLETED | OUTPATIENT
Start: 2024-06-17 | End: 2024-06-17

## 2024-06-17 RX ORDER — METHYLPREDNISOLONE 4 MG/1
TABLET ORAL
Qty: 21 TABLET | Refills: 0 | Status: SHIPPED | OUTPATIENT
Start: 2024-06-17

## 2024-06-17 RX ORDER — IOPAMIDOL 408 MG/ML
10 INJECTION, SOLUTION INTRATHECAL
Status: COMPLETED | OUTPATIENT
Start: 2024-06-17 | End: 2024-06-17

## 2024-06-17 RX ORDER — FENTANYL CITRATE 50 UG/ML
50 INJECTION, SOLUTION INTRAMUSCULAR; INTRAVENOUS ONCE
Status: COMPLETED | OUTPATIENT
Start: 2024-06-17 | End: 2024-06-17

## 2024-06-17 RX ADMIN — FENTANYL CITRATE 50 MCG: 50 INJECTION, SOLUTION INTRAMUSCULAR; INTRAVENOUS at 09:33

## 2024-06-17 RX ADMIN — METHYLPREDNISOLONE ACETATE 80 MG: 80 INJECTION, SUSPENSION INTRA-ARTICULAR; INTRALESIONAL; INTRAMUSCULAR; SOFT TISSUE at 09:32

## 2024-06-17 RX ADMIN — DEXAMETHASONE SODIUM PHOSPHATE 4 MG: 4 INJECTION, SOLUTION INTRAMUSCULAR; INTRAVENOUS at 04:42

## 2024-06-17 RX ADMIN — IOPAMIDOL 10 ML: 408 INJECTION, SOLUTION INTRATHECAL at 09:32

## 2024-06-17 RX ADMIN — METHOCARBAMOL TABLETS 750 MG: 750 TABLET, COATED ORAL at 10:51

## 2024-06-17 RX ADMIN — LIDOCAINE 1 PATCH: 4 PATCH TOPICAL at 10:51

## 2024-06-17 RX ADMIN — Medication 500 MCG: at 10:51

## 2024-06-17 RX ADMIN — MIDAZOLAM 1 MG: 1 INJECTION INTRAMUSCULAR; INTRAVENOUS at 09:33

## 2024-06-17 NOTE — H&P
[facility]    History and Physical    Patient Name: Wendy Dunn  :  1960  MRN:  [id number]  Date of Admission: 6/15/2024    Subjective     Patient is a 63 y.o. female presents with chief complaint of acute on chronic low back and leg: right pain.  Onset of symptoms was gradual starting several weeks ago.  Symptoms are associated/aggravated by nothing in particular or standing. Symptoms improve with nothing    The following portions of the patients history were reviewed and updated as appropriate: current medications, allergies, past medical history, past surgical history, past family history, past social history, and problem list      She reports primarily right-sided knee pain to me.  Has been seen in the emergency room and has a history of back pain with radicular symptoms down her leg.  Apparently she says her leg gave out on her not too long ago.  In the pain clinic she is mostly complaining of knee pain.  She has been seen by the emergency room and neurosurgery.  They would like an epidural steroid injection.    Her MRI shows multiple levels of degeneration with disc osteophyte complex throughout multiple levels.  Knee imaging shows an effusion but no acute fractures.    She has no leukocytosis or fever at this time.          Objective     Past Medical History:   Past Medical History:   Diagnosis Date   • GERD (gastroesophageal reflux disease)    • Hyperlipidemia    • Low back pain    • Nausea and vomiting 2020   • Osteoporosis      Past Surgical History:   Past Surgical History:   Procedure Laterality Date   • ANTERIOR CERVICAL DISCECTOMY W/ FUSION N/A 2018    Procedure: C4 to C6 anterior cervical discectomy, fusion, and instrumentation;  Surgeon: Padilla Saleh MD;  Location: [facility] OR;  Service:    • COLONOSCOPY N/A 3/5/2018    Procedure: COLONOSCOPY TO CECUM AND TI; COLD POLYPECTOMY;  Surgeon: Benita Barnes MD;  Location: [facility] ENDOSCOPY;  Service:    • MAMMO BILATERAL  " 04/2015    normal   • THYROIDECTOMY, PARTIAL     • TOTAL ABDOMINAL HYSTERECTOMY  2003     Family History:   Family History   Problem Relation Age of Onset   • Stroke Mother    • Hypertension Mother    • Stroke Father    • Stroke Sister    • Malig Hyperthermia Neg Hx      Social History:   Social History     Socioeconomic History   • Marital status:    Tobacco Use   • Smoking status: Every Day     Current packs/day: 0.50     Average packs/day: 0.5 packs/day for 0.5 years (0.2 ttl pk-yrs)     Types: Cigarettes     Start date: 2024     Last attempt to quit: 2015   • Smokeless tobacco: Never   Vaping Use   • Vaping status: Never Used   Substance and Sexual Activity   • Alcohol use: No   • Drug use: No   • Sexual activity: Never       Vital Signs Range for the last 24 hours  Temperature: Temp:  [36.4 °C (97.5 °F)-36.7 °C (98.1 °F)] 36.4 °C (97.5 °F)   Temp Source: Temp src: Infrared   BP: BP: (131-147)/(78-87) 137/85   Pulse: Heart Rate:  [67-85] 76   Respirations: Resp:  [14-18] 16   SPO2: SpO2:  [98 %-100 %] 98 %   O2 Amount (l/min):     O2 Devices Device (Oxygen Therapy): room air   Weight:       Flowsheet Rows      Flowsheet Row First Filed Value   Admission Height 160 cm (63\") Documented at 06/15/2024 1404   Admission Weight 83 kg (183 lb) Documented at 06/15/2024 1404            --------------------------------------------------------------------------------    Current Facility-Administered Medications   Medication Dose Route Frequency Provider Last Rate Last Admin   • acetaminophen (TYLENOL) tablet 650 mg  650 mg Oral Q4H PRN Becky Pimentel APRN       • sennosides-docusate (PERICOLACE) 8.6-50 MG per tablet 2 tablet  2 tablet Oral BID PRN Becky Pimentel APRN        And   • polyethylene glycol (MIRALAX) packet 17 g  17 g Oral Daily PRN Becky Pimentel APRN        And   • bisacodyl (DULCOLAX) EC tablet 5 mg  5 mg Oral Daily PRN Becky Pimentel APRN        And   • bisacodyl (DULCOLAX) suppository 10 mg  10 mg Rectal " Daily PRN Becky Pimentel APRAVILA       • dexAMETHasone (DECADRON) injection 10 mg  10 mg Intravenous Once HerBecky chappell APRN       • dexAMETHasone sodium phosphate injection 4 mg  4 mg Intravenous Q8H Becky Pimentel APRN   4 mg at 06/17/24 0442   • diazePAM (VALIUM) tablet 5 mg  5 mg Oral Once PRN Becky Pimentel, APRN       • fentaNYL citrate (PF) (SUBLIMAZE) injection 50 mcg  50 mcg Intravenous Once Render, Dorian Romano MD       • HYDROcodone-acetaminophen (NORCO) 5-325 MG per tablet 1 tablet  1 tablet Oral Q6H PRN Becky Pimentel, APRN       • iopamidol (ISOVUE-M 200) injection 41%  10 mL Epidural Once in imaging Render, Dorian Romano MD       • ketorolac (TORADOL) injection 15 mg  15 mg Intravenous Q6H PRN Becky Pimentel, APRN       • lidocaine (XYLOCAINE) 1 % injection 1 mL  1 mL Intradermal Once Render, Dorian Romano MD       • Lidocaine 4 % 1 patch  1 patch Transdermal Q24H HerthBecky APRAVILA   1 patch at 06/16/24 1014   • methocarbamol (ROBAXIN) tablet 750 mg  750 [facility]x Daily [facility]   750 mg at 06/16/24 2318   • methylPREDNISolone acetate (DEPO-medrol) injection 80 mg  80 mg Epidural Once Render, Dorian Romano MD       • midazolam (VERSED) injection 1 mg  1 mg Intravenous Once PRN Render, Dorian Romano MD       • nitroglycerin (NITROSTAT) SL tablet 0.4 mg  0.4 mg Sublingual Q5 Min PRN Becky Pimentel APRAVLIA       • rosuvastatin (CRESTOR) tablet 10 mg  10 mg Oral Daily Herth, NATASHA Pena   10 mg at 06/16/24 2036   • sodium chloride 0.9 % flush 10 mL  10 mL Intravenous PRN Becky Pimentel APRN       • sodium chloride 0.9 % flush 10 mL  10 mL Intravenous Q12H Becky Pimentel APRN   10 mL at 06/16/24 1014   • sodium chloride 0.9 % flush 10 mL  10 mL Intravenous PRN Becky Pimentel, APRN       • sodium chloride 0.9 % infusion 40 mL  40 mL Intravenous PRN Becky Pimentel APRN       • vitamin B-12 (CYANOCOBALAMIN) tablet 500 mcg  500 mcg Oral Daily Becky Pimentel APRN   500 mcg at 06/16/24 1011  "      --------------------------------------------------------------------------------  Assessment & Plan      Anesthesia Evaluation           Pain impairs ability to perform ADLs: Ambulation and Exercise/Activity  Modalities previously tried to control pain with limited effectiveness within the last 4-6 weeks: OTC medications     Airway   Dental      Pulmonary    (-) wheezes  Cardiovascular     Rhythm: regular    (-) murmur    PE comment: Right posterior tibial pulses palpable    Neuro/Psych  (+)   right straight leg raise test  GI/Hepatic/Renal/Endo    (+) liver disease    Musculoskeletal         PE comment: Passive flexion of her knee causes her significant pain.  Palpation of the effusion causes her pain.  There is no allodynia over the top of the knee.  Motor testing is difficult due to guarding from pain  Abdominal    Substance History      OB/GYN          Other                 Diagnosis and Plan    Treatment Plan  ASA 3      Procedures: Lumbar Epidural Steroid Injection(LESI), With fluoroscopy,      Anesthetic plan and risks discussed with patient.      Discussed with patient risk and benefits of STELLA including but not limited to : Bleeding, infection, PDPH, inadvertant spinal anesthetic, worsening pain, hyperglycemia, hypertension, CHF, nerve damage, steroid \"toxicity\" and AVN of hips.    I feel like she probably does have some articular issues in the knee that are causing this pain as well.  I do not think all of her pain can be explained by radicular symptoms however it is highly likely she does also have component of radiculopathy with this.  Pt agrees to proceed.  Diagnosis     * Connective tissue and disc stenosis of intervertebral foramina of lumbar region [M99.73]     * Lumbar radiculopathy [M54.16]                      "

## 2024-06-17 NOTE — OUTREACH NOTE
Prep Survey      Flowsheet Row Responses   [facility] patient discharged from? [place]   Is LACE score < 7 ? Yes   Eligibility [facility]   Date of Admission 06/15/24   Date of Discharge 06/17/24   Discharge Disposition Home or Self Care   Discharge diagnosis Acute right lumbar radiculopathy   Does the patient have one of the following disease processes/diagnoses(primary or secondary)? Other   Does the patient have Home health ordered? No   Is there a DME ordered? No   Prep survey completed? Yes            Brigid PETTIT - Registered Nurse

## 2024-06-17 NOTE — DISCHARGE SUMMARY
ED OBSERVATION PROGRESS/DISCHARGE SUMMARY    Date of Admission: 6/15/2024   LOS: 0 days   PCP: Wojciech Garcia MD    Final Diagnosis: Acute lumbosacral radiculopathy    Hospital Outcome:     Ms. Dunn was admitted to the observation unit for further evaluation due to right-sided low back pain.  She had an MRI of her lumbar spine which showed transitional anatomy with grade 1 spondylolithiasis at L4-5 as well as moderate right-sided foraminal stenosis at L4-5 and L5-S1.  No high-grade canal stenosis noted.  She was evaluated by neurosurgery.  They advised outpatient physical therapy.  She had an epidural injection prior to discharge with significant improvement of her symptoms.  She will follow-up with neurosurgery after completing physical therapy.      ROS:  General: no fevers, chills  Respiratory: no cough, dyspnea  Cardiovascular: no chest pain, palpitations  Abdomen: No abdominal pain, nausea, vomiting, or diarrhea  Neurologic: No focal weakness    Objective   Physical Exam:  I have reviewed the vital signs.  Temp:  [97.5 °F (36.4 °C)-98.1 °F (36.7 °C)] 97.5 °F (36.4 °C)  Heart Rate:  [67-85] 77  Resp:  [14-18] 16  BP: (131-165)/(77-96) 138/78  General Appearance:    Alert, cooperative, no distress  Head:    Normocephalic, atraumatic  Eyes:    Sclerae anicteric  Neck:   Supple, no mass  Lungs: Clear to auscultation bilaterally, respirations unlabored  Heart: Regular rate and rhythm, S1 and S2 normal, no murmur, rub or gallop  Abdomen:  Soft, non-tender, bowel sounds active, nondistended  Extremities: No clubbing, cyanosis, or edema to lower extremities  Pulses:  2+ and symmetric in distal lower extremities  Skin: No rashes   Neurologic: Oriented x3, Normal strength to extremities    Results Review:    I have reviewed the labs, radiology results and diagnostic studies.    Results from last 7 days   Lab Units 06/16/24  0343   WBC 10*3/mm3 5.53   HEMOGLOBIN g/dL 15.5   HEMATOCRIT % 46.7*   PLATELETS 10*3/mm3 274      Results from last 7 days   Lab Units 06/16/24  0343 06/15/24  1242   SODIUM mmol/L 138 139   POTASSIUM mmol/L 4.4 4.3   CHLORIDE mmol/L 105 104   CO2 mmol/L 24.3 29.0   BUN mg/dL 12 10   CREATININE mg/dL 0.71 0.80   CALCIUM mg/dL 9.5 9.0   GLUCOSE mg/dL 140* 92     Imaging Results (Last 24 Hours)       Procedure Component Value Units Date/Time    [place] Guided Pain Management Spine [[id number]] Resulted: 06/17/24 0940     Updated: 06/17/24 0940    Narrative:      This procedure was auto-finalized with no dictation required.            I have reviewed the medications.  ---------------------------------------------------------------------------------------------  Assessment & Plan   Assessment/Problem List    Acute right lumbar radiculopathy      Plan:  Right-sided lumbar radiculopathy  -MRI lumbar spine shows degenerative changes in the lumbar spine  -Neurosurgery consulted  -JOYCE on 6/17  -Discharge Medrol Dosepak/Pepcid  -Continue to use baclofen as needed  -Referral sent for outpatient physical therapy    Hyperlipidemia  -Continue rosuvastatin    Disposition: Home    Follow-up after Discharge: Follow-up with your primary care provider in 7 to 10 days, follow-up with neurosurgery after completing physical therapy    This note will serve as a discharge summary    Ros Mckeon, APRN 06/17/24 11:21 EDT    I have worn appropriate PPE during this patient encounter, sanitized my hands both with entering and exiting patient's room.    31 minutes has been spent by [facility] providers in the care of this patient while under observation status

## 2024-06-17 NOTE — PLAN OF CARE
Goal Outcome Evaluation:  Plan of Care Reviewed With: patient        Progress: improving  Outcome Evaluation: pt had a LESI. pt able to ambulate independently and cleared for discharge. f/u with PCP         Problem: Adult Inpatient Plan of Care  Goal: Plan of Care Review  Outcome: Met  Flowsheets (Taken 6/17/2024 1106)  Progress: improving  Plan of Care Reviewed With: patient  Outcome Evaluation: pt had a LESI. pt able to ambulate independently and cleared for discharge. f/u with PCP  Goal: Patient-Specific Goal (Individualized)  Outcome: Met  Goal: Absence of Hospital-Acquired Illness or Injury  Outcome: Met  Goal: Optimal Comfort and Wellbeing  Outcome: Met  Goal: Readiness for Transition of Care  Outcome: Met     Problem: Pain Acute  Goal: Acceptable Pain Control and Functional Ability  Outcome: Met

## 2024-06-17 NOTE — PLAN OF CARE
Goal Outcome Evaluation:   Plan of care reviewed with patient   Outcome summary: Patient AOX4, vitals stable, up without assist, plan for pain management to see today

## 2024-06-17 NOTE — DISCHARGE INSTRUCTIONS

## 2024-06-17 NOTE — ANESTHESIA PROCEDURE NOTES
PAIN Epidural block    Pre-sedation assessment completed: 6/17/2024 9:33 AM    Patient reassessed immediately prior to procedure    Start Time: 6/17/2024 9:34 AM  Stop Time: 6/17/2024 9:41 AM  Indication:at surgeon's request and procedure for pain  Performed By  Anesthesiologist: Dorian Solano MD  Preanesthetic Checklist  Completed: patient identified, risks and benefits discussed, surgical consent, monitors and equipment checked, pre-op evaluation and timeout performed  Additional Notes  Post-Op Diagnosis Codes:     * Connective tissue and disc stenosis of intervertebral foramina of lumbar region [M99.73]     * Lumbar radiculopathy [M54.16]    Prep:  Pt Position:prone  Sterile Tech:sterile barrier, mask and gloves  Prep:chlorhexidine gluconate and isopropyl alcohol  Monitoring:blood pressure monitoring, continuous pulse oximetry and EKG  Procedure:Sedation: yes     Approach:right paramedian  Guidance: fluoroscopy  Location:lumbar  Level:4-5  Needle Type:Tuohy  Needle Gauge:20 G  Aspiration:negative  Test Dose:negative  Medications:  Isovue:2mL  Comments:Sedation time was 933 until 941Depomedrol:80mg  Post Assessment:  Pt Tolerance:patient tolerated the procedure well with no apparent complications  Complications:no

## 2024-06-18 ENCOUNTER — TRANSITIONAL CARE MANAGEMENT TELEPHONE ENCOUNTER (OUTPATIENT)
Dept: CALL CENTER | Facility: HOSPITAL | Age: 64
End: 2024-06-18
Payer: COMMERCIAL

## 2024-06-18 NOTE — OUTREACH NOTE
Call Center TCM Note      Flowsheet Row Responses   [facility] patient discharged from? [place]   Does the patient have one of the following disease processes/diagnoses(primary or secondary)? Other   TCM attempt successful? No   Unsuccessful attempts Attempt 1   Call Status Left message            Cass Smith MA    6/18/2024, 13:11 EDT

## 2024-06-18 NOTE — OUTREACH NOTE
Call Center TCM Note      Flowsheet Row Responses   [facility] patient discharged from? [place]   Does the patient have one of the following disease processes/diagnoses(primary or secondary)? Other   TCM attempt successful? No   Unsuccessful attempts Attempt 2            Cass Smith MA    6/18/2024, 16:26 EDT

## 2024-06-19 ENCOUNTER — TELEPHONE (OUTPATIENT)
Dept: FAMILY MEDICINE CLINIC | Facility: CLINIC | Age: 64
End: 2024-06-19
Payer: COMMERCIAL

## 2024-06-19 ENCOUNTER — TRANSITIONAL CARE MANAGEMENT TELEPHONE ENCOUNTER (OUTPATIENT)
Dept: CALL CENTER | Facility: HOSPITAL | Age: 64
End: 2024-06-19
Payer: COMMERCIAL

## 2024-06-19 NOTE — OUTREACH NOTE
Call Center TCM Note      Flowsheet Row Responses   [facility] patient discharged from? [place]   Does the patient have one of the following disease processes/diagnoses(primary or secondary)? Other   TCM attempt successful? No   Unsuccessful attempts Attempt 3            oRs Fine RN    6/19/2024, 11:39 EDT

## 2024-06-20 ENCOUNTER — TELEPHONE (OUTPATIENT)
Dept: FAMILY MEDICINE CLINIC | Facility: CLINIC | Age: 64
End: 2024-06-20

## 2024-06-20 NOTE — TELEPHONE ENCOUNTER
Provider: DR JIN    Caller: CHRISSY ANDRADE    Relationship to Patient: PATIENT      Phone Number: [phone]    Reason for Call: PATIENT IS CALLING TO STATE SHE WENT TO THE EMERGENCY ROOM AT [facility] AND DISCHARGED ON 06/15/24.  SHE STATES SHE RECEIVED AN INJECTION IN HER HIP BUT THE PAIN IS STILL IN HER KNEE.  SHE STATES SHE IS UNABLE TO PUT ANY PRESSURE ON THE KNEE.  SHE IS WANTING TO KNOW WHAT DR JIN RECOMMENDS.  SHE STATES SHE IS SUPPOSE TO GO INTO WORK TONIGHT AND SHE IS UNSURE IF SHE CAN WORK  WITH WALKING ON THE [employer].    PLEASE ADVISE.

## 2024-06-20 NOTE — TELEPHONE ENCOUNTER
I TALKED TO PT. PT HAS A SCHEDULED APPOINTMENT WITH ORTHO ON  6/27/2024   PT DECLINED TO SCHEDULE AN APPOINTMENT FOR FOLLOW UP   DR JIN ADVISED

## 2024-06-21 ENCOUNTER — OFFICE VISIT (OUTPATIENT)
Dept: FAMILY MEDICINE CLINIC | Facility: CLINIC | Age: 64
End: 2024-06-21
Payer: COMMERCIAL

## 2024-06-21 VITALS
DIASTOLIC BLOOD PRESSURE: 90 MMHG | HEIGHT: 63 IN | RESPIRATION RATE: 16 BRPM | HEART RATE: 78 BPM | BODY MASS INDEX: 32.6 KG/M2 | OXYGEN SATURATION: 99 % | SYSTOLIC BLOOD PRESSURE: 138 MMHG | WEIGHT: 184 LBS

## 2024-06-21 DIAGNOSIS — M54.16 ACUTE RIGHT LUMBAR RADICULOPATHY: Primary | ICD-10-CM

## 2024-06-21 DIAGNOSIS — Z09 HOSPITAL DISCHARGE FOLLOW-UP: ICD-10-CM

## 2024-06-21 PROCEDURE — 99495 TRANSJ CARE MGMT MOD F2F 14D: CPT | Performed by: NURSE PRACTITIONER

## 2024-06-21 RX ORDER — TRAMADOL HYDROCHLORIDE 50 MG/1
50 TABLET ORAL EVERY 8 HOURS PRN
Qty: 40 TABLET | Refills: 0 | Status: SHIPPED | OUTPATIENT
Start: 2024-06-21

## 2024-06-21 NOTE — LETTER
June 21, 2024     Patient: Wendy Dunn   YOB: 1960   Date of Visit: 6/21/2024       To Whom It May Concern:    It is my medical opinion that Wendy Dunn should remain off work until after her follow up appointment with specialist on 6/27/24.       Sincerely,        NATASHA Subramanian    CC: No Recipients

## 2024-06-21 NOTE — PROGRESS NOTES
"Subjective   Patient ID: Wendy Dunn is a 63 y.o. female is being seen for hospital follow-up of low back and right lower extremity radiculopathy.  She was evaluated by Nelson Neff PA-C on 6/16/2024 after prior ER visit on 6//24 for same complaints.  MRI imaging revealed grade 1 spondylolisthesis L4/5 with moderate right foraminal stenosis L4/5 and L5/S1.  She improved with medical management including steroids, lumbar epidural injection on 6/17/2024.  She was discharged and recommended for outpatient physical therapy.    History of Present Illness  Today, patient reports onset of pain in right leg in early May after pushing a heavy box with her R foot. She states it is a knot like pain at the medial knee. There is also pain in the patella and behind the knee. Weight bearing causes increase in pain and sensation of leg wanting to give out. She has some swelling in the lower leg but no numbness or pain. No knee surgery in past. She does not feel that the steroids- oral and LESI did not help. She has not seen ortho. No knee support.     Daily half pack per day smoker.  No cancer history.  No prior lumbar surgery.  She does have a history of C4-6 ACDF with Dr. Saleh January 12, 2018    The following portions of the patient's history were reviewed and updated as appropriate: allergies, current medications, past family history, past medical history, past social history, past surgical history, and problem list.    Review of Systems   Genitourinary:  Positive for frequency. Negative for difficulty urinating and urgency.   Musculoskeletal:  Positive for arthralgias and joint swelling. Negative for back pain.        R leg pain    Neurological:  Positive for weakness (R leg) and numbness (R leg).   All other systems reviewed and are negative.      Objective     Vitals:    06/27/24 1035   BP: 130/76   Pulse: 81   Temp: 98.2 °F (36.8 °C)   SpO2: 97%   Weight: 85 kg (187 lb 6.4 oz)   Height: 160 cm (63\")     Body mass " index is 33.2 kg/m².    Tobacco Use: High Risk (6/27/2024)    Patient History     Smoking Tobacco Use: Every Day     Smokeless Tobacco Use: Never     Passive Exposure: Not on file          Physical Exam  Vitals reviewed.   Constitutional:       Appearance: Normal appearance.   Pulmonary:      Effort: Pulmonary effort is normal.   Musculoskeletal:      Lumbar back: No tenderness or bony tenderness. Negative right straight leg raise test and negative left straight leg raise test.      Left knee: Swelling and effusion present. No bony tenderness. Decreased range of motion. Tenderness (Medial knee> lateral) present.   Neurological:      General: No focal deficit present.      Mental Status: She is alert.   Psychiatric:         Mood and Affect: Mood normal.         Speech: Speech normal.         Thought Content: Thought content normal.       Neurologic Exam     Mental Status   Speech: speech is normal   Level of consciousness: alert  Knowledge: good.     Motor Exam   Muscle bulk: normal  Overall muscle tone: normal  5/5 bilateral lower extremity     Sensory Exam   Right leg light touch: Some numbness around the lateral aspect of the knee.  Left leg light touch: normal    Gait, Coordination, and Reflexes     Gait  Gait: (Antalgic favoring right.  Attempts to avoid weightbearing.  Knee slightly shaquille with weightbearing.  Much improved with walker)          Assessment & Plan   Independent Review of Radiographic Studies:      I personally reviewed the images from the following studies.    R knee xray 6/16-  FINDINGS:     Mild knee effusion. Mild degenerative spurring is present. No acute  fracture, erosion, or dislocation is identified. If there is clinical  suspicion for internal derangement of the knee, MRI could be considered  for further evaluation.    Duplex venous RLE:  Interpretation Summary         Right popliteal fossa fluid collection.    Normal right lower extremity venous duplex scan.    Medical Decision  Making:      Patient presents for evaluation/follow-up from hospital evaluation of right leg pain.  Patient adamantly states that she has no back pain and no radicular pain.  She states the pain is specifically around her right knee particularly the medial aspect.  Her pain is worsened with weightbearing and she has a sense of giveaway weakness of the knee.  She also has a sense of fullness and swelling of the leg.  She had a negative Doppler study.  Knee x-ray showed effusion and some mild degenerative changes.  MRI lumbar spine revealed some moderate foraminal narrowing L4/5 and L5/S1.  Certainly could cause knee pain, that her symptoms are not radicular in nature.  I am somewhat concerned about the stability of her knee due to her pain and difficulty ambulating.  I spoke with orthopedic nurse who recommended a walker and some topical NSAID.  She was given a walker prior to discharge from the office.  She felt much more comfortable ambulating with a walker.  I have made an urgent referral to orthopedics to evaluate her for knee issue.  She can follow-up with our service as needed.    Diagnoses and all orders for this visit:    1. Acute pain of right knee (Primary)  -     Ambulatory Referral to Orthopedic Surgery  -     Walker  Walker Folding with Wheels    Other orders  -     Diclofenac Sodium (VOLTAREN) 1 % gel gel; Apply 4 g topically to the appropriate area as directed 4 (Four) Times a Day As Needed (knee pain).      Return if symptoms worsen or fail to improve.

## 2024-06-21 NOTE — LETTER
Controlled Substance Prescribing Agreement          I, Wendy Dunn [PATIENT],  1960 [] a patient of  Netta Breaux APRN ([place])   [PROVIDER] at [facility] [PRACTICE], have been informed that  individuals who are prescribed certain Controlled Substances including, but not limited to, narcotic pain medicines, stimulants, benzodiazepine tranquilizers, and barbiturate sedatives, can abuse those substances or may allow abuse by others, and have some risk of developing an addictive disorder or suffering a relapse of a prior addiction. Therefore, I have been informed that it is necessary to observe strict rules pertaining to their use, and I agree to follow the terms and procedures described in this Agreement as consideration for, and as a condition of, the willingness of the physician whose signature appears below to consider prescribing or to continue prescribing Controlled Substances to treat my pain.     1. I will inform my physician of any current or past substance abuse, or any current or past substance abuse of any immediate member of my immediate family.     2. I agree that I may be subject to a voluntary evaluation by psychologists and/or psychiatrists, possibly at my own expense, before any Controlled Substances will be prescribed to me. I agree that the need to be evaluated by psychologists and/or psychiatrists may be revisited every three (3) to six (6) months thereafter while taking the medication.     3. All Controlled Substances must come from a provider in the PROVIDER’S PRACTICE. My Controlled Substances will come from the PROVIDER whose signature appears below, or during his or her absence, by the covering provider, unless specific written authorization is obtained from the office for an exception.     4. I will obtain all Controlled Substances from the same pharmacy. Should the need arise to change pharmacies, I will inform the PROVIDER’S office.     5. I  will inform the PROVIDER’S office of any new medications or medical conditions, and of any adverse effects I experience from any of the medications that I take.     6. I will inform my other health care providers that I am taking the Controlled Substances listed above, and of the existence of this Agreement. In the event of an emergency, I will provide the foregoing information to emergency department providers.     7. I agree that my prescribing PROVIDER has permission to discuss all diagnostic and treatment details with other health care providers, pharmacists, or other professionals who provide my health care regarding my use of Controlled Substances for purposes of maintaining accountability.     8. I will not allow anyone else to have, use sell, or otherwise have access to these medications. The sharing of medications with anyone is absolutely forbidden and is against the law.         9. I understand that Controlled Substances may be hazardous or lethal to a person who is not tolerant to their effects, especially a child, and that I must keep them out of reach of such people for their own safety.     10. I understand that tampering with a written prescription is a felony and I will not change or tamper with the PROVIDER’S written prescription.     11. I am aware that attempting to obtain a Controlled Substance under false pretenses is illegal.     12. I agree not to alter my medication in any way, and I will take my medication whole, and it will not be broken, chewed, crushed, injected, or snorted.     13. I will take my medication as instructed and prescribed, and I will not exceed the maximum prescribed dose. Any change in dosage must be approved by the PROVIDER or a physician within the PRACTICE.     14. I understand that these drugs should not be stopped abruptly, as withdrawal syndromes may develop.     15. I will cooperate with unannounced urine or serum toxicology screenings as may be requested, as well  as any random pill counts of medication by the PROVIDER. Failure to comply may result in termination of the PROVIDER-patient relationship.     16. I understand that the presence of unauthorized and/or illegal substances in the screenings described in the paragraph above may prompt referral for assessment for a substance abuse disorder or termination of the PROVIDER-patient relationship.     17. I understand that medications may not be replaced if they are lost, damaged, or stolen. If any of these situations arise that cause me to request an early refill of my medication, a copy of a filed police report or a statement from me explaining the circumstances may be required before additional prescriptions are considered. If I request an early refill secondary to lost, damaged, or stolen prescriptions twice within a year, I may be discharged from the practice.     18. I understand that a prescription may be given early if the PROVIDER or the patient will be out of town when the refill is due. These prescriptions will contain instructions to the pharmacist that the prescriptions(s) may not be filled prior to the appropriate date.     19. If the responsible legal authorities have questions concerning my treatment, as may occur, for example, if I obtained medication at several pharmacies, all confidentiality is waived, and these authorities may be given full access to my full records of Controlled Substances administration.     20. I will keep my scheduled appointments in order to receive medication renewals. If I need to cancel my appointment, I will do so a minimum of twenty-four (24) hours before it is scheduled.     21. I understand that I may be asked to bring my medications in their original container to the PROVIDER’s office while I am on controlled medication.     22. Refills generally will not be given over the phone, after office hours, during the weekends, and on holidays.     23. I understand that any medical  treatment is initially a trial, with the goal of treatment being to improve the quality of life and ability to function and/or work. These parameters will be assessed periodically to determine the benefits of continued therapy, and continued prescription is contingent on whether my physician believes that the medication usage benefits me. I will comply with all treatments as outlined by the PROVIDER.     24. I have been explained the risks and potential benefits of these therapies, including, but not limited to, psychological addiction, physical dependence, withdrawal and over dosage.     25. I understand that failure to adhere to these policies and/or failure to comply with the PROVIDER’S treatment plan may result in cessation of therapy with Controlled Substance prescribing by the PROVIDER or referral for further specialty assessment, as well as possible discharge from the PRACTICE.     26. I, the undersigned patient, attest that the foregoing was discussed with me, and that I have read, fully understand, and agree to all of the above requirements and instructions. I affirm that I have the full right and power to sign and be bound by this  Agreement.         Date:  __________________________________________    Time:  __________________________________________    Patient Printed Name:  _____________________________    Patient Signature:  ________________________________           Date:  __________________________________________    Time:  __________________________________________    Provider Signature:  _______________________________

## 2024-06-24 NOTE — PROGRESS NOTES
Subjective   Wendy Dunn is a 63 y.o. female.     Pain  Associated symptoms include weakness.      Wendy Dunn 63 y.o. female presents today for hosptial follow up.  she was treated [facility] for low back pain.  I reviewed all of the labs and diagnostic testing.  The patient's medications were not changed:  Current outpatient and discharge medications have been reconciled for the patient.  Reviewed by: NATASHA Armstrong ([place])    she does have a follow up appointment with a specialist: neurosurgery       Hospital note as follows:      The patient is admitted to the observation unit for further evaluation of back pain radiating down her right leg patient reports worse than usual for her over the past 5 days.  Patient reports she pushed a heavy object with her leg with increasing pain since that time earlier this week.  Patient reports she feels much improved post epidural injection this morning.  With significant relief of pain radiating down her buttock, knee and calf.  Patient reports she is ambulated without difficulty after procedure.  Patient reports the pain radiates from her right buttock through her thigh down the lateral aspect of her right lower leg to her foot  Patient voices understanding of need to follow-up with PCP with plan for PT if she is PCP feel this would be beneficial, short course of steroids and if she has persistent isolated knee discomfort, consider outpatient follow-up with orthopedic specialist.               Pain (R knee pain keeps getting worse.  Tried to go back to work yesterday and was not able to tolerate it.  Is scheduled for ortho on the 27th but need a note to stay off work until then.)    She is also wanting something else for pain as the prednisone taper and lidocaine she was given has not helped.    The following portions of the patient's history were reviewed and updated as appropriate: allergies, current medications, past family history, past medical history, past  social history, past surgical history, and problem list.    Review of Systems   Musculoskeletal:  Positive for back pain.   Neurological:  Positive for weakness.   Psychiatric/Behavioral:  Negative for behavioral problems.        Objective   Physical Exam  Vitals and nursing note reviewed.   Constitutional:       Appearance: Normal appearance. She is well-developed.   Cardiovascular:      Rate and Rhythm: Normal rate.   Pulmonary:      Effort: Pulmonary effort is normal.   Neurological:      Mental Status: She is alert and oriented to person, place, and time.   Psychiatric:         Mood and Affect: Mood normal.         Behavior: Behavior normal.         Thought Content: Thought content normal.         Judgment: Judgment normal.         Assessment & Plan   Problems Addressed this Visit          Neuro    Acute right lumbar radiculopathy - Primary    Relevant Medications    traMADol (ULTRAM) 50 MG tablet     Diagnoses         Codes Comments    Acute right lumbar radiculopathy    -  Primary ICD-10-CM: M54.16  ICD-9-CM: 724.4                  Hospital records reviewed with pt confirming HPI.     Current outpatient and discharge medications have been reconciled for the patient.  Reviewed by: NATASHA Armstrong ([place])    Temporary supply of tramadol prescribed.  Truman reviewed and CSA filled out.

## 2024-06-27 ENCOUNTER — OFFICE VISIT (OUTPATIENT)
Dept: NEUROSURGERY | Facility: CLINIC | Age: 64
End: 2024-06-27
Payer: COMMERCIAL

## 2024-06-27 VITALS
SYSTOLIC BLOOD PRESSURE: 130 MMHG | OXYGEN SATURATION: 97 % | DIASTOLIC BLOOD PRESSURE: 76 MMHG | TEMPERATURE: 98.2 F | HEIGHT: 63 IN | BODY MASS INDEX: 33.2 KG/M2 | HEART RATE: 81 BPM | WEIGHT: 187.4 LBS

## 2024-06-27 DIAGNOSIS — M25.561 ACUTE PAIN OF RIGHT KNEE: Primary | ICD-10-CM

## 2024-06-27 NOTE — LETTER
June 27, 2024     Patient: Wendy Dunn   YOB: 1960   Date of Visit: 6/27/2024       To Whom It May Concern:    It is my medical opinion that Wendy Dunn should remain out of work until appointment with orthopedic surgeon .           Sincerely,        NATASHA Johnson    CC:   No Recipients

## 2024-07-02 ENCOUNTER — TELEPHONE (OUTPATIENT)
Dept: FAMILY MEDICINE CLINIC | Facility: CLINIC | Age: 64
End: 2024-07-02
Payer: COMMERCIAL

## 2024-07-02 NOTE — TELEPHONE ENCOUNTER
Caller: [employer]    Relationship: Other    Best call back number: [phone]     What was the call regarding: [employer] IS CALLING TO SPEAK TO SOMEONE REGARDING THE PATIENTS SHORT TERM DISABILITY. SHE IS NEED TO KNOW IF  TAKEN OUT OF WORK OR PUT ON RESTRICTIONS AND IF SO, WHEN AND THEN WHAT THE DIAGNOSIS WAS.     PLEASE CALL AND ADVISE

## 2024-07-03 NOTE — TELEPHONE ENCOUNTER
Gabby Santa from [employer] a  informing her I have reviewed all notes since hospital f/u. Nothing is mentioned about being off work instructed to contact our office for further questions or concern    HUB TO RELAY

## 2024-07-16 ENCOUNTER — TELEPHONE (OUTPATIENT)
Dept: NEUROSURGERY | Facility: CLINIC | Age: 64
End: 2024-07-16

## 2024-07-22 ENCOUNTER — OFFICE VISIT (OUTPATIENT)
Dept: ORTHOPEDIC SURGERY | Facility: CLINIC | Age: 64
End: 2024-07-22
Payer: COMMERCIAL

## 2024-07-22 VITALS — HEIGHT: 63 IN | WEIGHT: 183.9 LBS | BODY MASS INDEX: 32.59 KG/M2 | TEMPERATURE: 98.2 F

## 2024-07-22 DIAGNOSIS — S83.241A TEAR OF MEDIAL MENISCUS OF RIGHT KNEE, CURRENT, UNSPECIFIED TEAR TYPE, INITIAL ENCOUNTER: Primary | ICD-10-CM

## 2024-07-22 PROCEDURE — 99203 OFFICE O/P NEW LOW 30 MIN: CPT | Performed by: ORTHOPAEDIC SURGERY

## 2024-07-22 NOTE — PROGRESS NOTES
New Knee      Patient: Wendy Dunn        YOB: 1960    Medical Record Number: [id number]        Chief Complaints: Right knee pain      History of Present Illness: This is a 69-year-old who presents complaining of right knee pain that began on 6/15/2024 was at work moving a load using her leg she states she did that about 3 times her knee did not start hurting her then but a few days later.  They did not think this was work related she states she also has some significant numbness in the legs she was seen in the ER and that was really the workup for that they did an MRI of her spine which does show some degenerative changes x-ray of her knee show some patellofemoral arthritis she is quite limited in what she can do she cannot work she has tried ice stretching she states her time where she cannot bear weight        Allergies: No Known Allergies    Medications:   Home Medications:  Current Outpatient Medications on File Prior to Visit   Medication Sig    baclofen (LIORESAL) 10 MG tablet Take 1 tablet by mouth 3 (Three) Times a Day As Needed for Muscle Spasms.    Calcium Carbonate-Vit D-Min (CALCIUM 1200 PO) Take 1 tablet by mouth Daily.    Diclofenac Sodium (VOLTAREN) 1 % gel gel Apply 4 g topically to the appropriate area as directed 4 (Four) Times a Day As Needed (knee pain).    famotidine (Pepcid) 20 MG tablet Take 1 tablet by mouth 2 (Two) Times a Day.    Lidocaine 4 % Place 1 patch on the skin as directed by provider Daily. Remove & Discard patch within 12 hours or as directed by MD    methylPREDNISolone (MEDROL) 4 MG dose pack Take as directed on package instructions.    Omega-3 Fatty Acids (fish oil) 1000 MG capsule capsule Take 1 capsule by mouth Daily With Breakfast.    rosuvastatin (Crestor) 10 MG tablet Take 1 tablet by mouth Daily. For cholesterol and labs due 3 mos (Patient taking differently: Take 1 tablet by mouth Daily.)    traMADol (ULTRAM) 50 MG tablet Take 1 tablet by mouth Every  8 (Eight) Hours As Needed for Moderate Pain. (Patient not taking: Reported on 6/27/2024)    vitamin B-12 (CYANOCOBALAMIN) 500 MCG tablet Take 1 tablet by mouth Daily.    vitamin E 400 UNIT capsule Take 1 capsule by mouth Every Morning.     No current facility-administered medications on file prior to visit.     Current Medications:  Scheduled Meds:  Continuous Infusions:No current facility-administered medications for this visit.    PRN Meds:.    Past Medical History:   Diagnosis Date    GERD (gastroesophageal reflux disease)     Hyperlipidemia     Low back pain     Nausea and vomiting 12/01/2020    Osteoporosis         Past Surgical History:   Procedure Laterality Date    ANTERIOR CERVICAL DISCECTOMY W/ FUSION N/A 1/12/2018    Procedure: C4 to C6 anterior cervical discectomy, fusion, and instrumentation;  Surgeon: Padilla Saleh MD;  Location: [facility] MAIN OR;  Service:     COLONOSCOPY N/A 3/5/2018    Procedure: COLONOSCOPY TO CECUM AND TI; COLD POLYPECTOMY;  Surgeon: Benita Barnes MD;  Location: [facility] ENDOSCOPY;  Service:     MAMMO BILATERAL  04/2015    normal    THYROIDECTOMY, PARTIAL      TOTAL ABDOMINAL HYSTERECTOMY  2003        Social History     Occupational History    Not on file   Tobacco Use    Smoking status: Every Day     Current packs/day: 0.50     Average packs/day: 0.5 packs/day for 0.6 years (0.3 ttl pk-yrs)     Types: Cigarettes     Start date: 2024     Last attempt to quit: 2015    Smokeless tobacco: Never   Vaping Use    Vaping status: Never Used   Substance and Sexual Activity    Alcohol use: No    Drug use: No    Sexual activity: Never      Social History     Social History Narrative    Not on file        Family History   Problem Relation Age of Onset    Stroke Mother     Hypertension Mother     Stroke Father     Stroke Sister     Malig Hyperthermia Neg Hx              Review of Systems:     Review of Systems      Physical Exam: 64 y.o. female  General Appearance:    Alert, cooperative, in  no acute distress                 There were no vitals filed for this visit.   Patient is alert and read ×3 no acute distress appears her above-listed at height weight and age.  Affect is normal respiratory rate is normal unlabored. Heart rate regular rate rhythm, sclera, dentition and hearing are normal for the purpose of this exam.        Ortho Exam  Physical exam of the right  knee reveals no effusion no redness.  The patient does have tenderness about the medial l joint line.  No tenderness about the lateral l joint line.  A negative bounce home and a positive l medial Wanda.    Patient has a stable ligamentous exam.  The patient has a negative Lachman and negative anterior drawer and a negative pivot shift.  Quads are reasonable and symmetric bilaterally.  Calf is soft and nontender.  There is no overlying skin changes no lymphedema lymphadenopathy.  Patient has good hip range of motion full symmetric and asymptomatic and a normal ankle exam.  She has good distal pulses and sensation distally is intact     Procedures             Radiology:   AP, Lateral and merchant views of the right knee  were/reviewed to evauateknee pain.  She has no bony abnormality she does have some patellofemoral arthritis I have reviewed these I did review her lumbar spine MRI we talked a little bit about that  Imaging Results (Most Recent)       None          Assessment/Plan:    Severe right knee pain I think she is got some back issues giving rise to the numbness and tingling I think she has some dedicated knee issues with sometimes inability to ambulate sometimes inability to fully extend is suspicious of meniscal pathology or cartilage fragment plan is to proceed with an MRI

## 2024-07-22 NOTE — LETTER
July 22, 2024     Patient: Wendy Dunn   YOB: 1960   Date of Visit: 7/22/2024       To Whom It May Concern:    It is my medical opinion that Wendy Dunn will remain off work at this time.  She has been off [employer]'s work since June 21, 2024 due to a knee injury.           Sincerely,        Carmella Whitmore MD    CC:   No Recipients

## 2024-07-26 ENCOUNTER — PATIENT ROUNDING (BHMG ONLY) (OUTPATIENT)
Dept: ORTHOPEDIC SURGERY | Facility: CLINIC | Age: 64
End: 2024-07-26
Payer: COMMERCIAL

## 2024-07-26 NOTE — PROGRESS NOTES
A [employer] Message has been sent to the patient for [place] [facility] [place]

## 2024-07-29 ENCOUNTER — TELEPHONE (OUTPATIENT)
Dept: ORTHOPEDIC SURGERY | Facility: CLINIC | Age: 64
End: 2024-07-29

## 2024-07-29 NOTE — TELEPHONE ENCOUNTER
Caller: Wendy Dunn    Relationship to patient: Self    Best call back number: [phone]    Patient is needing: WILL YOU CALL HER SHORT TERM DISABILITY THEY NEED TO KNOW THE LAST DOCTOR WHO KEPT HER OFF OF WORK WHICH WAS DR. VASQUEZ - CALL [phone] PLEASE REACH OUT TO PATIENT WITH ANY QUESTIONS  CLAIM [id number]

## 2024-08-01 NOTE — TELEPHONE ENCOUNTER
Provider:      Caller: CHRISSY ANDRADE     Relationship to Patient: SELF     Phone Number: [phone] (home)      Reason for Call: PATIENT STATES SHE RECEIVED A CALL ABOUT 2 NAMES ON HER DISABILITY PAPERWORK . PLEASE ADVISE.

## 2024-08-06 ENCOUNTER — TELEPHONE (OUTPATIENT)
Dept: ORTHOPEDIC SURGERY | Facility: CLINIC | Age: 64
End: 2024-08-06

## 2024-08-06 NOTE — TELEPHONE ENCOUNTER
Caller: MARCIAL    Relationship: INSURANCE     Best call back number: [phone]    What orders are you requesting (i.e. lab or imaging): MRI OF RIGHT KNEE WITHOUT CONTRAST     In what timeframe would the patient need to come in: ASAP     Where will you receive your lab/imaging services: [facility]   [address] [place] [id number]     FAX: [phone]  AUTH #: [id number] EFFECTS DATES : 07/30/2024- 01/26/2025    CONTACT: CLIFFORD WYNNE   PHONE: [phone]  CASE #: [id number]    Additional notes: NA

## 2024-08-07 ENCOUNTER — TELEPHONE (OUTPATIENT)
Dept: ORTHOPEDIC SURGERY | Facility: CLINIC | Age: 64
End: 2024-08-07
Payer: COMMERCIAL

## 2024-08-15 NOTE — PROGRESS NOTES
Patient: Wendy Dunn  YOB: 1960  Date of Service: 8/15/2024    Chief Complaints: Right knee pain    Subjective:    History of Present Illness: Pt is seen in the office today with complaints of right knee pain she is here follow-up of MRI.  MRI demonstrates a complex tear of the medial meniscus with a flap she also has some mild medial compartment arthritis.  She states her knee is killing her is preventing her from working she really wishes to proceed the neck step she is seeing physical therapy and is to continue to do her exercises at home she does have some low back issues but her symptoms seem mechanical and localized to the knee not radicular in origin to me        Allergies: No Known Allergies    Medications:   Home Medications:  Current Outpatient Medications on File Prior to Visit   Medication Sig    baclofen (LIORESAL) 10 MG tablet Take 1 tablet by mouth 3 (Three) Times a Day As Needed for Muscle Spasms.    Calcium Carbonate-Vit D-Min (CALCIUM 1200 PO) Take 1 tablet by mouth Daily.    Diclofenac Sodium (VOLTAREN) 1 % gel gel Apply 4 g topically to the appropriate area as directed 4 (Four) Times a Day As Needed (knee pain).    famotidine (Pepcid) 20 MG tablet Take 1 tablet by mouth 2 (Two) Times a Day.    Lidocaine 4 % Place 1 patch on the skin as directed by provider Daily. Remove & Discard patch within 12 hours or as directed by MD    methylPREDNISolone (MEDROL) 4 MG dose pack Take as directed on package instructions.    Omega-3 Fatty Acids (fish oil) 1000 MG capsule capsule Take 1 capsule by mouth Daily With Breakfast.    rosuvastatin (Crestor) 10 MG tablet Take 1 tablet by mouth Daily. For cholesterol and labs due 3 mos (Patient taking differently: Take 1 tablet by mouth Daily.)    traMADol (ULTRAM) 50 MG tablet Take 1 tablet by mouth Every 8 (Eight) Hours As Needed for Moderate Pain. (Patient not taking: Reported on 7/22/2024)    vitamin B-12 (CYANOCOBALAMIN) 500 MCG tablet Take 1  tablet by mouth Daily.    vitamin E 400 UNIT capsule Take 1 capsule by mouth Every Morning.     No current facility-administered medications on file prior to visit.     Current Medications:  Scheduled Meds:  Continuous Infusions:No current facility-administered medications for this visit.    PRN Meds:.    I have reviewed the patient's medical history in detail and updated the computerized patient record.  Review and summarization of old records include:    Past Medical History:   Diagnosis Date    GERD (gastroesophageal reflux disease)     Hyperlipidemia     Low back pain     Nausea and vomiting 12/01/2020    Osteoporosis         Past Surgical History:   Procedure Laterality Date    ANTERIOR CERVICAL DISCECTOMY W/ FUSION N/A 1/12/2018    Procedure: C4 to C6 anterior cervical discectomy, fusion, and instrumentation;  Surgeon: Padilla Saleh MD;  Location: [facility] MAIN OR;  Service:     COLONOSCOPY N/A 3/5/2018    Procedure: COLONOSCOPY TO CECUM AND TI; COLD POLYPECTOMY;  Surgeon: Benita Barnes MD;  Location: [facility] ENDOSCOPY;  Service:     MAMMO BILATERAL  04/2015    normal    THYROIDECTOMY, PARTIAL      TOTAL ABDOMINAL HYSTERECTOMY  2003        Social History     Occupational History    Not on file   Tobacco Use    Smoking status: Every Day     Current packs/day: 0.50     Average packs/day: 0.5 packs/day for 0.6 years (0.3 ttl pk-yrs)     Types: Cigarettes     Start date: 2024     Last attempt to quit: 2015    Smokeless tobacco: Never   Vaping Use    Vaping status: Never Used   Substance and Sexual Activity    Alcohol use: No    Drug use: No    Sexual activity: Never      Social History     Social History Narrative    Not on file        Family History   Problem Relation Age of Onset    Stroke Mother     Hypertension Mother     Stroke Father     Stroke Sister     Malig Hyperthermia Neg Hx        ROS: 14 point review of systems was performed and was negative except for documented findings in HPI and today's  encounter.     Allergies: No Known Allergies  Constitutional:  Denies fever, shaking or chills   Eyes:  Denies change in visual acuity   HENT:  Denies nasal congestion or sore throat   Respiratory:  Denies cough or shortness of breath   Cardiovascular:  Denies chest pain or severe LE edema   GI:  Denies abdominal pain, nausea, vomiting, bloody stools or diarrhea   Musculoskeletal:  Numbness, tingling, or loss of motor function only as noted above in history of present illness.  : Denies painful urination or hematuria  Integument:  Denies rash, lesion or ulceration   Neurologic:  Denies headache or focal weakness  Endocrine:  Denies lymphadenopathy  Psych:  Denies confusion or change in mental status   Hem:  Denies active bleeding      Physical Exam: 64 y.o. female  Wt Readings from Last 3 Encounters:   07/22/24 83.4 kg (183 lb 14.4 oz)   06/27/24 85 kg (187 lb 6.4 oz)   06/21/24 83.5 kg (184 lb)       There is no height or weight on file to calculate BMI.    There were no vitals filed for this visit.  Vital signs reviewed.   General Appearance:    Alert, cooperative, in no acute distress                    Ortho exam    Physical exam of the right  knee reveals no effusion no redness.  The patient does have tenderness about the medial l joint line.  No tenderness about the lateral l joint line.  A negative bounce home and a positive l medial Wanda.    Patient has a stable ligamentous exam.  The patient has a negative Lachman and negative anterior drawer and a negative pivot shift.  Quads are reasonable and symmetric bilaterally.  Calf is soft and nontender.  There is no overlying skin changes no lymphedema lymphadenopathy.  Patient has good hip range of motion full symmetric and asymptomatic and a normal ankle exam.  She has good distal pulses and sensation distally is intact   Physical Exam: 64 y.o. female  General Appearance:    Alert, cooperative, in no acute distress                      Vitals:    08/19/24  "1333   Temp: 98.4 °F (36.9 °C)   Weight: 85.1 kg (187 lb 9.6 oz)   Height: 160 cm (63\")   PainSc:   8        Head:    Normocephalic, without obvious abnormality, atraumatic   Eyes:            conjunctivae and sclerae normal, no pallor, corneas clear,    Ears:    Ears appear intact with no abnormalities noted   Throat:   No oral lesions, no thrush, oral mucosa moist   Neck:   No adenopathy, supple, trachea midline, no thyromegaly,    Back:     No kyphosis present, no scoliosis present, no skin lesions,      erythema or scars, no tenderness to percussion or                   palpation,   range of motion normal   Lungs:     ,respirations regular, even and                  unlabored    Heart:    Regular rhythm and normal rate               Chest Wall:    No abnormalities observed               Pulses:   Pulses palpable and equal bilaterally   Skin:   No bleeding, bruising or rash   Lymph nodes:   No palpable adenopathy   Neurologic:   Appears neurologic intact              MRIs as above have reviewed the films myself and agree with the findings    Assessment: Right knee pain with a medial meniscus tear she has a large flap this is truly mechanical plan is to proceed with arthroscopy she is done physical therapy she is limited her activity she has been icing all with no lasting improvement she cannot work at this time we will keep her off work plan is to proceed with arthroscopy we did discuss in detail risk benefits and alternatives  The patient voiced understanding of the risks, benefits, and alternative forms of treatment that were discussed and the patient consents to proceed with the above listed surgery.  All risks, benefits and alternatives were discussed.  Risks including to but not exclusive to anesthetic complications, including death, MI, CVA, infection, bleeding DVT, fracture, residual pain and need for future surgery.  She understands and agrees to proceed    Plan:   Follow up as indicated.  Ice, elevate, " and rest as needed.  Discussed conservative measures of pain control including ice, bracing.  Also talked about the importance of strengthening and maintaining ideal body weight    Carmella Whitmore M.D.

## 2024-08-19 ENCOUNTER — OFFICE VISIT (OUTPATIENT)
Dept: ORTHOPEDIC SURGERY | Facility: CLINIC | Age: 64
End: 2024-08-19
Payer: COMMERCIAL

## 2024-08-19 VITALS — TEMPERATURE: 98.4 F | WEIGHT: 187.6 LBS | HEIGHT: 63 IN | BODY MASS INDEX: 33.24 KG/M2

## 2024-08-19 DIAGNOSIS — S83.231D COMPLEX TEAR OF MEDIAL MENISCUS OF RIGHT KNEE AS CURRENT INJURY, SUBSEQUENT ENCOUNTER: ICD-10-CM

## 2024-08-19 DIAGNOSIS — R52 PAIN: Primary | ICD-10-CM

## 2024-08-19 NOTE — LETTER
August 19, 2024     Patient: Wendy Dunn   YOB: 1960   Date of Visit: 8/19/2024       To Whom It May Concern:    It is my medical opinion that Wendy Dunn was seen in the office today.  She will remain off work until after her surgery.           Sincerely,        Carmella Whitmore MD    CC:   No Recipients

## 2024-08-21 ENCOUNTER — TELEPHONE (OUTPATIENT)
Dept: ORTHOPEDIC SURGERY | Facility: CLINIC | Age: 64
End: 2024-08-21
Payer: COMMERCIAL

## 2024-08-22 PROBLEM — S83.231A COMPLEX TEAR OF MEDIAL MENISCUS OF RIGHT KNEE AS CURRENT INJURY: Status: ACTIVE | Noted: 2024-08-19

## 2024-08-23 ENCOUNTER — PRE-ADMISSION TESTING (OUTPATIENT)
Dept: PREADMISSION TESTING | Facility: HOSPITAL | Age: 64
End: 2024-08-23
Payer: COMMERCIAL

## 2024-08-23 VITALS
TEMPERATURE: 98 F | BODY MASS INDEX: 32.11 KG/M2 | WEIGHT: 188.1 LBS | DIASTOLIC BLOOD PRESSURE: 91 MMHG | OXYGEN SATURATION: 100 % | SYSTOLIC BLOOD PRESSURE: 140 MMHG | RESPIRATION RATE: 16 BRPM | HEART RATE: 85 BPM | HEIGHT: 64 IN

## 2024-08-23 LAB
ANION GAP SERPL CALCULATED.3IONS-SCNC: 9.8 MMOL/L (ref 5–15)
BUN SERPL-MCNC: 9 MG/DL (ref 8–23)
BUN/CREAT SERPL: 12.3 (ref 7–25)
CALCIUM SPEC-SCNC: 9.3 MG/DL (ref 8.6–10.5)
CHLORIDE SERPL-SCNC: 103 MMOL/L (ref 98–107)
CO2 SERPL-SCNC: 27.2 MMOL/L (ref 22–29)
CREAT SERPL-MCNC: 0.73 MG/DL (ref 0.57–1)
DEPRECATED RDW RBC AUTO: 45.9 FL (ref 37–54)
EGFRCR SERPLBLD CKD-EPI 2021: 92.5 ML/MIN/1.73
ERYTHROCYTE [DISTWIDTH] IN BLOOD BY AUTOMATED COUNT: 13.7 % (ref 12.3–15.4)
GLUCOSE SERPL-MCNC: 88 MG/DL (ref 65–99)
HCT VFR BLD AUTO: 43.2 % (ref 34–46.6)
HGB BLD-MCNC: 14.5 G/DL (ref 12–15.9)
MCH RBC QN AUTO: 31 PG (ref 26.6–33)
MCHC RBC AUTO-ENTMCNC: 33.6 G/DL (ref 31.5–35.7)
MCV RBC AUTO: 92.3 FL (ref 79–97)
PLATELET # BLD AUTO: 247 10*3/MM3 (ref 140–450)
PMV BLD AUTO: 9.9 FL (ref 6–12)
POTASSIUM SERPL-SCNC: 3.9 MMOL/L (ref 3.5–5.2)
RBC # BLD AUTO: 4.68 10*6/MM3 (ref 3.77–5.28)
SODIUM SERPL-SCNC: 140 MMOL/L (ref 136–145)
WBC NRBC COR # BLD AUTO: 6.32 10*3/MM3 (ref 3.4–10.8)

## 2024-08-23 PROCEDURE — 85027 COMPLETE CBC AUTOMATED: CPT

## 2024-08-23 PROCEDURE — 36415 COLL VENOUS BLD VENIPUNCTURE: CPT

## 2024-08-23 PROCEDURE — 80048 BASIC METABOLIC PNL TOTAL CA: CPT

## 2024-08-23 NOTE — DISCHARGE INSTRUCTIONS
Take the following medications the morning of surgery: NONE    HOLD VITAMINS, SUPPLEMENTS, HERBAL REMEDIES PRIOR TO SURGERY.  HOLD NSAIDS PER SURGEON INSTRUCTIONS.    ARRIVE TO ENTRANCE C.      If you are on prescription narcotic pain medication to control your pain you may also take that medication the morning of surgery.      General Instructions:     Do not eat solid food after midnight the night before surgery.  Clear liquids day of surgery are allowed but must be stopped at least two hours before your hospital arrival time.       Allowed clear liquids      Water, sodas, and tea or coffee with no cream or milk added.       12 to 20 ounces of a clear liquid that contains carbohydrates is recommended.  If non-diabetic, have Gatorade or Powerade.  If diabetic, have G2 or Powerade Zero.     Do not have liquids red in color.  Do not consume chicken, beef, pork or vegetable broth or bouillon cubes of any variety as they are not considered clear liquids and are not allowed.      Infants may have breast milk up to four hours before surgery.  Infants drinking formula may drink formula up to six hours before surgery.   Patients who avoid smoking, chewing tobacco and alcohol for 4 weeks prior to surgery have a reduced risk of post-operative complications.  Quit smoking as many days before surgery as you can.  Do not smoke, use chewing tobacco or drink alcohol the day of surgery.   If applicable bring your C-PAP/ BI-PAP machine in with you to preop day of surgery.  Bring any papers given to you in the doctor’s office.  Wear clean comfortable clothes.  Do not wear contact lenses, false eyelashes or make-up.  Bring a case for your glasses.   Bring crutches or walker if applicable.  Remove all piercings.  Leave jewelry and any other valuables at home.  Hair extensions with metal clips must be removed prior to surgery.  The Pre-Admission Testing nurse will instruct you to bring medications if unable to obtain an accurate list  in Pre-Admission Testing.        If you were given a blood bank ID arm band remember to bring it with you the day of surgery.    Preventing a Surgical Site Infection:  For 2 to 3 days before surgery, avoid shaving with a razor because the razor can irritate skin and make it easier to develop an infection.    Any areas of open skin can increase the risk of a post-operative wound infection by allowing bacteria to enter and travel throughout the body.  Notify your surgeon if you have any skin wounds / rashes even if it is not near the expected surgical site.  The area will need assessed to determine if surgery should be delayed until it is healed.  The night prior to surgery shower using a fresh bar of anti-bacterial soap (such as Dial) and clean washcloth.  Sleep in a clean bed with clean clothing.  Do not allow pets to sleep with you.  Shower on the morning of surgery using a fresh bar of anti-bacterial soap (such as Dial) and clean washcloth.  Dry with a clean towel and dress in clean clothing.  Ask your surgeon if you will be receiving antibiotics prior to surgery.  Make sure you, your family, and all healthcare providers clean their hands with soap and water or an alcohol based hand  before caring for you or your wound.    Day of surgery:  Your arrival time is approximately two hours before your scheduled surgery time.  Please note if you have an early arrival time the surgery doors do not open before 5:00 AM.  Upon arrival, a Pre-op nurse and Anesthesiologist will review your health history, obtain vital signs, and answer questions you may have.  The only belongings needed at this time will be a list of your home medications and if applicable your C-PAP/BI-PAP machine.  A Pre-op nurse will start an IV and you may receive medication in preparation for surgery, including something to help you relax.     Please be aware that surgery does come with discomfort.  We want to make every effort to control your  discomfort so please discuss any uncontrolled symptoms with your nurse.   Your doctor will most likely have prescribed pain medications.      If you are going home after surgery you will receive individualized written care instructions before being discharged.  A responsible adult must drive you to and from the hospital on the day of your surgery and ideally stay with you through the night.   .  Discharge prescriptions can be filled by the hospital pharmacy during regular pharmacy hours.  If you are having surgery late in the day/evening your prescription may be e-prescribed to your pharmacy.  Please verify your pharmacy hours or chose a 24 hour pharmacy to avoid not having access to your prescription because your pharmacy has closed for the day.    If you are staying overnight following surgery, you will be transported to your hospital room following the recovery period.  [facility] has all private rooms.    If you have any questions please call Pre-Admission Testing at [phone].  Deductibles and co-payments are collected on the day of service. Please be prepared to pay the required co-pay, deductible or deposit on the day of service as defined by your plan.    Call your surgeon immediately if you experience any of the following symptoms:  Sore Throat  Shortness of Breath or difficulty breathing  Cough  Chills  Body soreness or muscle pain  Headache  Fever  New loss of taste or smell  Do not arrive for your surgery ill.  Your procedure will need to be rescheduled to another time.  You will need to call your physician before the day of surgery to avoid any unnecessary exposure to hospital staff as well as other patients.

## 2024-08-27 ENCOUNTER — APPOINTMENT (OUTPATIENT)
Dept: GENERAL RADIOLOGY | Facility: HOSPITAL | Age: 64
End: 2024-08-27
Payer: COMMERCIAL

## 2024-08-27 ENCOUNTER — ANESTHESIA EVENT (OUTPATIENT)
Dept: PERIOP | Facility: HOSPITAL | Age: 64
End: 2024-08-27
Payer: COMMERCIAL

## 2024-08-27 ENCOUNTER — HOSPITAL ENCOUNTER (OUTPATIENT)
Facility: HOSPITAL | Age: 64
Setting detail: HOSPITAL OUTPATIENT SURGERY
Discharge: HOME | End: 2024-08-27
Attending: ORTHOPAEDIC SURGERY | Admitting: ORTHOPAEDIC SURGERY
Payer: COMMERCIAL

## 2024-08-27 ENCOUNTER — ANESTHESIA (OUTPATIENT)
Dept: PERIOP | Facility: HOSPITAL | Age: 64
End: 2024-08-27
Payer: COMMERCIAL

## 2024-08-27 VITALS
HEART RATE: 70 BPM | SYSTOLIC BLOOD PRESSURE: 144 MMHG | RESPIRATION RATE: 18 BRPM | DIASTOLIC BLOOD PRESSURE: 68 MMHG | OXYGEN SATURATION: 97 % | TEMPERATURE: 97.4 F

## 2024-08-27 DIAGNOSIS — S83.231D COMPLEX TEAR OF MEDIAL MENISCUS OF RIGHT KNEE AS CURRENT INJURY, SUBSEQUENT ENCOUNTER: ICD-10-CM

## 2024-08-27 PROCEDURE — 25010000002 FENTANYL CITRATE (PF) 50 MCG/ML SOLUTION: Performed by: NURSE ANESTHETIST, CERTIFIED REGISTERED

## 2024-08-27 PROCEDURE — 25010000002 CEFAZOLIN PER 500 MG: Performed by: ORTHOPAEDIC SURGERY

## 2024-08-27 PROCEDURE — 29880 ARTHRS KNE SRG MNISECTMY M&L: CPT | Performed by: ORTHOPAEDIC SURGERY

## 2024-08-27 PROCEDURE — 25010000002 PROPOFOL 200 MG/20ML EMULSION: Performed by: NURSE ANESTHETIST, CERTIFIED REGISTERED

## 2024-08-27 PROCEDURE — 25010000002 PROPOFOL 10 MG/ML EMULSION: Performed by: NURSE ANESTHETIST, CERTIFIED REGISTERED

## 2024-08-27 PROCEDURE — 25010000002 FENTANYL CITRATE (PF) 100 MCG/2ML SOLUTION: Performed by: NURSE ANESTHETIST, CERTIFIED REGISTERED

## 2024-08-27 PROCEDURE — 25010000002 DEXAMETHASONE SODIUM PHOSPHATE 20 MG/5ML SOLUTION: Performed by: NURSE ANESTHETIST, CERTIFIED REGISTERED

## 2024-08-27 PROCEDURE — 25010000002 HYDROMORPHONE PER 4 MG: Performed by: NURSE ANESTHETIST, CERTIFIED REGISTERED

## 2024-08-27 PROCEDURE — 25010000002 METHYLPREDNISOLONE PER 80 MG: Performed by: ORTHOPAEDIC SURGERY

## 2024-08-27 PROCEDURE — 25810000003 LACTATED RINGERS PER 1000 ML: Performed by: STUDENT IN AN ORGANIZED HEALTH CARE EDUCATION/TRAINING PROGRAM

## 2024-08-27 PROCEDURE — 25010000002 ONDANSETRON PER 1 MG: Performed by: NURSE ANESTHETIST, CERTIFIED REGISTERED

## 2024-08-27 PROCEDURE — 25010000002 KETOROLAC TROMETHAMINE PER 15 MG: Performed by: NURSE ANESTHETIST, CERTIFIED REGISTERED

## 2024-08-27 RX ORDER — FENTANYL CITRATE 50 UG/ML
INJECTION, SOLUTION INTRAMUSCULAR; INTRAVENOUS AS NEEDED
Status: DISCONTINUED | OUTPATIENT
Start: 2024-08-27 | End: 2024-08-27 | Stop reason: SURG

## 2024-08-27 RX ORDER — ONDANSETRON 2 MG/ML
INJECTION INTRAMUSCULAR; INTRAVENOUS AS NEEDED
Status: DISCONTINUED | OUTPATIENT
Start: 2024-08-27 | End: 2024-08-27 | Stop reason: SURG

## 2024-08-27 RX ORDER — IPRATROPIUM BROMIDE AND ALBUTEROL SULFATE 2.5; .5 MG/3ML; MG/3ML
3 SOLUTION RESPIRATORY (INHALATION) ONCE AS NEEDED
Status: DISCONTINUED | OUTPATIENT
Start: 2024-08-27 | End: 2024-08-27 | Stop reason: HOSPADM

## 2024-08-27 RX ORDER — PROPOFOL 10 MG/ML
INJECTION, EMULSION INTRAVENOUS AS NEEDED
Status: DISCONTINUED | OUTPATIENT
Start: 2024-08-27 | End: 2024-08-27 | Stop reason: SURG

## 2024-08-27 RX ORDER — DROPERIDOL 2.5 MG/ML
0.62 INJECTION, SOLUTION INTRAMUSCULAR; INTRAVENOUS
Status: DISCONTINUED | OUTPATIENT
Start: 2024-08-27 | End: 2024-08-27 | Stop reason: HOSPADM

## 2024-08-27 RX ORDER — FLUMAZENIL 0.1 MG/ML
0.2 INJECTION INTRAVENOUS AS NEEDED
Status: DISCONTINUED | OUTPATIENT
Start: 2024-08-27 | End: 2024-08-27 | Stop reason: HOSPADM

## 2024-08-27 RX ORDER — DEXTROSE MONOHYDRATE 25 G/50ML
12.5 INJECTION, SOLUTION INTRAVENOUS AS NEEDED
Status: DISCONTINUED | OUTPATIENT
Start: 2024-08-27 | End: 2024-08-27

## 2024-08-27 RX ORDER — SODIUM CHLORIDE 0.9 % (FLUSH) 0.9 %
3-10 SYRINGE (ML) INJECTION AS NEEDED
Status: DISCONTINUED | OUTPATIENT
Start: 2024-08-27 | End: 2024-08-27 | Stop reason: HOSPADM

## 2024-08-27 RX ORDER — HYDRALAZINE HYDROCHLORIDE 20 MG/ML
5 INJECTION INTRAMUSCULAR; INTRAVENOUS
Status: DISCONTINUED | OUTPATIENT
Start: 2024-08-27 | End: 2024-08-27 | Stop reason: HOSPADM

## 2024-08-27 RX ORDER — SODIUM CHLORIDE 0.9 % (FLUSH) 0.9 %
3 SYRINGE (ML) INJECTION EVERY 12 HOURS SCHEDULED
Status: DISCONTINUED | OUTPATIENT
Start: 2024-08-27 | End: 2024-08-27 | Stop reason: HOSPADM

## 2024-08-27 RX ORDER — FAMOTIDINE 10 MG/ML
20 INJECTION, SOLUTION INTRAVENOUS ONCE
Status: COMPLETED | OUTPATIENT
Start: 2024-08-27 | End: 2024-08-27

## 2024-08-27 RX ORDER — DEXAMETHASONE SODIUM PHOSPHATE 4 MG/ML
INJECTION, SOLUTION INTRA-ARTICULAR; INTRALESIONAL; INTRAMUSCULAR; INTRAVENOUS; SOFT TISSUE AS NEEDED
Status: DISCONTINUED | OUTPATIENT
Start: 2024-08-27 | End: 2024-08-27 | Stop reason: SURG

## 2024-08-27 RX ORDER — EPHEDRINE SULFATE 50 MG/ML
5 INJECTION, SOLUTION INTRAVENOUS ONCE AS NEEDED
Status: DISCONTINUED | OUTPATIENT
Start: 2024-08-27 | End: 2024-08-27 | Stop reason: HOSPADM

## 2024-08-27 RX ORDER — NALOXONE HCL 0.4 MG/ML
0.2 VIAL (ML) INJECTION AS NEEDED
Status: DISCONTINUED | OUTPATIENT
Start: 2024-08-27 | End: 2024-08-27 | Stop reason: HOSPADM

## 2024-08-27 RX ORDER — KETOROLAC TROMETHAMINE 30 MG/ML
INJECTION, SOLUTION INTRAMUSCULAR; INTRAVENOUS AS NEEDED
Status: DISCONTINUED | OUTPATIENT
Start: 2024-08-27 | End: 2024-08-27 | Stop reason: SURG

## 2024-08-27 RX ORDER — PROMETHAZINE HYDROCHLORIDE 25 MG/1
25 SUPPOSITORY RECTAL ONCE AS NEEDED
Status: DISCONTINUED | OUTPATIENT
Start: 2024-08-27 | End: 2024-08-27 | Stop reason: HOSPADM

## 2024-08-27 RX ORDER — SODIUM CHLORIDE, SODIUM LACTATE, POTASSIUM CHLORIDE, CALCIUM CHLORIDE 600; 310; 30; 20 MG/100ML; MG/100ML; MG/100ML; MG/100ML
9 INJECTION, SOLUTION INTRAVENOUS CONTINUOUS
Status: DISCONTINUED | OUTPATIENT
Start: 2024-08-27 | End: 2024-08-27 | Stop reason: HOSPADM

## 2024-08-27 RX ORDER — HYDROCODONE BITARTRATE AND ACETAMINOPHEN 5; 325 MG/1; MG/1
1 TABLET ORAL EVERY 4 HOURS PRN
Qty: 28 TABLET | Refills: 0 | Status: SHIPPED | OUTPATIENT
Start: 2024-08-27

## 2024-08-27 RX ORDER — DIPHENHYDRAMINE HYDROCHLORIDE 50 MG/ML
12.5 INJECTION INTRAMUSCULAR; INTRAVENOUS
Status: DISCONTINUED | OUTPATIENT
Start: 2024-08-27 | End: 2024-08-27 | Stop reason: HOSPADM

## 2024-08-27 RX ORDER — LABETALOL HYDROCHLORIDE 5 MG/ML
5 INJECTION, SOLUTION INTRAVENOUS
Status: DISCONTINUED | OUTPATIENT
Start: 2024-08-27 | End: 2024-08-27 | Stop reason: HOSPADM

## 2024-08-27 RX ORDER — LIDOCAINE HYDROCHLORIDE 20 MG/ML
INJECTION, SOLUTION EPIDURAL; INFILTRATION; INTRACAUDAL; PERINEURAL AS NEEDED
Status: DISCONTINUED | OUTPATIENT
Start: 2024-08-27 | End: 2024-08-27 | Stop reason: SURG

## 2024-08-27 RX ORDER — HYDROCODONE BITARTRATE AND ACETAMINOPHEN 5; 325 MG/1; MG/1
1 TABLET ORAL ONCE AS NEEDED
Status: DISCONTINUED | OUTPATIENT
Start: 2024-08-27 | End: 2024-08-27 | Stop reason: HOSPADM

## 2024-08-27 RX ORDER — PROMETHAZINE HYDROCHLORIDE 25 MG/1
25 TABLET ORAL ONCE AS NEEDED
Status: DISCONTINUED | OUTPATIENT
Start: 2024-08-27 | End: 2024-08-27 | Stop reason: HOSPADM

## 2024-08-27 RX ORDER — FENTANYL CITRATE 50 UG/ML
50 INJECTION, SOLUTION INTRAMUSCULAR; INTRAVENOUS ONCE AS NEEDED
Status: DISCONTINUED | OUTPATIENT
Start: 2024-08-27 | End: 2024-08-27 | Stop reason: HOSPADM

## 2024-08-27 RX ORDER — FENTANYL CITRATE 50 UG/ML
50 INJECTION, SOLUTION INTRAMUSCULAR; INTRAVENOUS
Status: DISCONTINUED | OUTPATIENT
Start: 2024-08-27 | End: 2024-08-27 | Stop reason: HOSPADM

## 2024-08-27 RX ORDER — ONDANSETRON 2 MG/ML
4 INJECTION INTRAMUSCULAR; INTRAVENOUS ONCE AS NEEDED
Status: DISCONTINUED | OUTPATIENT
Start: 2024-08-27 | End: 2024-08-27 | Stop reason: HOSPADM

## 2024-08-27 RX ORDER — OXYCODONE AND ACETAMINOPHEN 7.5; 325 MG/1; MG/1
1 TABLET ORAL EVERY 4 HOURS PRN
Status: DISCONTINUED | OUTPATIENT
Start: 2024-08-27 | End: 2024-08-27 | Stop reason: HOSPADM

## 2024-08-27 RX ORDER — LIDOCAINE HYDROCHLORIDE 10 MG/ML
0.5 INJECTION, SOLUTION INFILTRATION; PERINEURAL ONCE AS NEEDED
Status: DISCONTINUED | OUTPATIENT
Start: 2024-08-27 | End: 2024-08-27 | Stop reason: HOSPADM

## 2024-08-27 RX ORDER — MIDAZOLAM HYDROCHLORIDE 1 MG/ML
1 INJECTION INTRAMUSCULAR; INTRAVENOUS
Status: DISCONTINUED | OUTPATIENT
Start: 2024-08-27 | End: 2024-08-27 | Stop reason: HOSPADM

## 2024-08-27 RX ORDER — HYDROMORPHONE HYDROCHLORIDE 1 MG/ML
0.5 INJECTION, SOLUTION INTRAMUSCULAR; INTRAVENOUS; SUBCUTANEOUS
Status: DISCONTINUED | OUTPATIENT
Start: 2024-08-27 | End: 2024-08-27 | Stop reason: HOSPADM

## 2024-08-27 RX ADMIN — PROPOFOL 200 MG: 10 INJECTION, EMULSION INTRAVENOUS at 08:32

## 2024-08-27 RX ADMIN — SODIUM CHLORIDE 2 G: 900 INJECTION INTRAVENOUS at 08:22

## 2024-08-27 RX ADMIN — ONDANSETRON 4 MG: 2 INJECTION INTRAMUSCULAR; INTRAVENOUS at 09:01

## 2024-08-27 RX ADMIN — OXYCODONE AND ACETAMINOPHEN 1 TABLET: 7.5; 325 TABLET ORAL at 09:29

## 2024-08-27 RX ADMIN — FAMOTIDINE 20 MG: 10 INJECTION INTRAVENOUS at 07:09

## 2024-08-27 RX ADMIN — FENTANYL CITRATE 100 MCG: 50 INJECTION, SOLUTION INTRAMUSCULAR; INTRAVENOUS at 08:32

## 2024-08-27 RX ADMIN — HYDROMORPHONE HYDROCHLORIDE 0.5 MG: 1 INJECTION, SOLUTION INTRAMUSCULAR; INTRAVENOUS; SUBCUTANEOUS at 09:39

## 2024-08-27 RX ADMIN — LIDOCAINE HYDROCHLORIDE 80 MG: 20 INJECTION, SOLUTION EPIDURAL; INFILTRATION; INTRACAUDAL; PERINEURAL at 08:32

## 2024-08-27 RX ADMIN — SODIUM CHLORIDE, POTASSIUM CHLORIDE, SODIUM LACTATE AND CALCIUM CHLORIDE 9 ML/HR: 600; 310; 30; 20 INJECTION, SOLUTION INTRAVENOUS at 07:10

## 2024-08-27 RX ADMIN — FENTANYL CITRATE 50 MCG: 50 INJECTION, SOLUTION INTRAMUSCULAR; INTRAVENOUS at 09:29

## 2024-08-27 RX ADMIN — DEXAMETHASONE SODIUM PHOSPHATE 8 MG: 4 INJECTION, SOLUTION INTRAMUSCULAR; INTRAVENOUS at 08:37

## 2024-08-27 RX ADMIN — PROPOFOL 25 MCG/KG/MIN: 10 INJECTION, EMULSION INTRAVENOUS at 08:35

## 2024-08-27 RX ADMIN — KETOROLAC TROMETHAMINE 30 MG: 30 INJECTION, SOLUTION INTRAMUSCULAR at 08:56

## 2024-08-27 NOTE — ANESTHESIA POSTPROCEDURE EVALUATION
Patient: Wendy Dunn    Procedure Summary       Date: 08/27/24 Room / Location:  [facility] [address] [place] [address] [facility]    Anesthesia Start: 0825 Anesthesia Stop: 0924    Procedure: KNEE ARTHROSCOPY with partial medial and lateral menisectomy and debridement of arthritis (Right: Knee) Diagnosis:       Complex tear of medial meniscus of right knee as current injury, subsequent encounter      (Complex tear of medial meniscus of right knee as current injury, subsequent encounter [I03.020D])    Surgeons: Carmella Whitmore MD Provider: Gm Dowell MD    Anesthesia Type: general ASA Status: 2            Anesthesia Type: general    Vitals  Vitals Value Taken Time   /87 08/27/24 1000   Temp 36.3 °C (97.4 °F) 08/27/24 0918   Pulse 62 08/27/24 1013   Resp 18 08/27/24 1000   SpO2 97 % 08/27/24 1013   Vitals shown include unfiled device data.        Anesthesia Post Evaluation

## 2024-08-27 NOTE — H&P
History & Physical       Patient: Wendy Dunn    Date of Admission: 8/27/2024  5:31 AM    YOB: 1960    Medical Record Number: [id number]    Attending Physician: Carmella Whitmore MD        Chief Complaints: Complex tear of medial meniscus of right knee as current injury, subsequent encounter [S83.231D]      History of Present Illness: This patient is several month history of severe right knee pain she has an exam and MRI which are consistent with a complex tear of the medial meniscus she also has a subchondral stress fracture of the medial femoral condyle she has significant activity limiting symptoms and she presents for arthroscopy, partial meniscectomy, possible subchondroplasty     Allergies: No Known Allergies    Medications:   Home Medications:  No current facility-administered medications on file prior to encounter.     Current Outpatient Medications on File Prior to Encounter   Medication Sig    Calcium Carbonate-Vit D-Min (CALCIUM 1200 PO) Take 1 tablet by mouth Daily. HOLD PRIOR TO SURGERY    Omega-3 Fatty Acids (fish oil) 1000 MG capsule capsule Take 1 capsule by mouth Daily With Breakfast. HOLD PRIOR TO SURGERY    rosuvastatin (Crestor) 10 MG tablet Take 1 tablet by mouth Daily. For cholesterol and labs due 3 mos    vitamin B-12 (CYANOCOBALAMIN) 500 MCG tablet Take 1 tablet by mouth Daily. HOLD PRIOR TO SURGERY    vitamin E 400 UNIT capsule Take 1 capsule by mouth Every Morning. HOLD PRIOR TO SURGERY     Current Medications:  Scheduled Meds:ceFAZolin, 2 g, Intravenous, Once      Continuous Infusions:   PRN Meds:.    Past Medical History:   Diagnosis Date    GERD (gastroesophageal reflux disease)     Hyperlipidemia     Low back pain     Nausea and vomiting 12/01/2020    Osteoporosis         Past Surgical History:   Procedure Laterality Date    ANTERIOR CERVICAL DISCECTOMY W/ FUSION N/A 1/12/2018    Procedure: C4 to C6 anterior cervical discectomy, fusion, and instrumentation;   Surgeon: Padilla Saleh MD;  Location: [facility] MAIN OR;  Service:     COLONOSCOPY N/A 3/5/2018    Procedure: COLONOSCOPY TO CECUM AND TI; COLD POLYPECTOMY;  Surgeon: Benita Barnes MD;  Location: [facility] ENDOSCOPY;  Service:     MAMMO BILATERAL  04/2015    normal    THYROIDECTOMY, PARTIAL      TOTAL ABDOMINAL HYSTERECTOMY  2003        Social History     Occupational History    Not on file   Tobacco Use    Smoking status: Every Day     Current packs/day: 0.50     Average packs/day: 0.5 packs/day for 0.7 years (0.3 ttl pk-yrs)     Types: Cigarettes     Start date: 2024     Last attempt to quit: 2015    Smokeless tobacco: Never   Vaping Use    Vaping status: Never Used   Substance and Sexual Activity    Alcohol use: No    Drug use: No    Sexual activity: Never      Social History     Social History Narrative    Not on file        Family History   Problem Relation Age of Onset    Stroke Mother     Hypertension Mother     Stroke Father     Stroke Sister     Malig Hyperthermia Neg Hx        Review of Systems      Physical Exam: 64 y.o. female  General Appearance:    Alert, cooperative, in no acute distress                      Vitals:    08/27/24 0605   BP: 139/95   BP Location: Left arm   Patient Position: Sitting   Pulse: 88   Resp: 18   Temp: 97.6 °F (36.4 °C)   TempSrc: Oral   SpO2: 100%        Head:  Normocephalic, without obvious abnormality, atraumatic   Eyes:          Conjunctivae and sclerae normal, no pallor, corneas clear,    Ears:  Ears appear intact with no abnormalities noted   Throat: No oral lesions, no thrush, oral mucosa moist   Neck: No adenopathy, supple, trachea midline, no thyromegaly,    Back:   No kyphosis present, no scoliosis present, no skin lesions,      erythema or scars, no tenderness to percussion or                   palpation,range of motion normal   Lungs:   C respirations regular, even and                 unlabored    Heart:  Regular rhythm and normal rate               Chest Wall:  No  abnormalities observed               Pulses: Pulses palpable and equal bilaterally   Skin: No bleeding, bruising or rash   Lymph nodes: No palpable adenopathy   Neurologic: Appears neurologic intact             Assessment:    Complex tear of medial meniscus of right knee as current injury          Plan: All risks, benefits and alternatives were discussed.  Risks including but not exclusive to anesthetic complications, including death, MI, CVA, infection, bleeding DVT, PE,  fracture, residual pain and need for future surgery.  Patient understood all and agrees to proceed.

## 2024-08-27 NOTE — ANESTHESIA PROCEDURE NOTES
Airway  Urgency: elective    Date/Time: 8/27/2024 8:34 AM  Airway not difficult    General Information and Staff    Patient location during procedure: OR  Anesthesiologist: Gm Dowell MD  CRNA/CAA: Kena Lewis CRNA    Indications and Patient Condition  Indications for airway management: airway protection    Preoxygenated: yes  Mask difficulty assessment: 1 - vent by mask    Final Airway Details  Final airway type: supraglottic airway      Successful airway: classic  Size 4     Number of attempts at approach: 1  Assessment: lips, teeth, and gum same as pre-op and atraumatic intubation    Additional Comments  PreO2, IV induction, easy mask, LMA placed w/o difficulty, cuff air placed, green zone, secured in placed, EBBSH, +etCO2, atraumatic, teeth and lips as preop.

## 2024-08-27 NOTE — OP NOTE
Operative Note      Facility: [facility]  Patient Name: Wendy Dunn  YOB: 1960  Date: 8/27/2024  Medical Record Number: [id number]      Pre-op Diagnosis:   Complex tear of medial meniscus of right knee as current injury, subsequent encounter [S83.231D]    Post-Op Diagnosis Codes:     * Complex tear of medial meniscus of right knee as current injury, subsequent encounter [S83.231D]  Lateral meniscus tear, grade 3 and isolated grade 4 change of the medial femoral condyle grade 3 changes patella  Procedure(s):  KNEE ARTHROSCOPY with partial medial and lateral menisectomy and debridement of arthritis chondroplasty of the medial femoral condyle patella    Surgeon(s):  Carmella Whitmore MD    Anesthesia: General  Anesthesiologist: Gm Dowell MD  CRNA: Kena Lewis CRNA    Staff:   Circulator: Bronwyn Chaparro RN  Scrub Person: Jad Pearson  Vendor Representative: Nikhil Jack    Assistants : none      Estimated Blood Loss: 5 mL    Specimens:    none     Drains: None    Findings: See Dictation    Complications: None      Indication for procedure: This patient has had a several month history of knee pain and has an exam and an MRI which are consistent with meniscal pathology. They understand all options and wish to proceed with arthroscopy.      Description of procedure: The patient was taken to the operating room. They were placed supine on the operating room table. After induction of adequate LMA anesthesia, IV antibiotics the patient underwent exam under anesthesia with symmetric full range of motion. Nonsterile tourniquet was applied patient was placed in the thigh bautista all prominent areas were well padded and end of the table dropped. The leg was prepped and draped in usual sterile fashion. Standard lateral incision was made with 11 blade. Blunt trocar penetrated into the joint, scope followed and the evaluation began.  The patella.  Is essentially within the trochlear  groove she did have degenerative changes on the patella and the trochlear groove felt to be grade 3 I then entered the medial compartment under spinal needle localization direct visualization and medial portals established.  She had a complex tear of the medial meniscus with a large radial component posteriorly this was resected with various angled biters baskets motorized carolee ultimately the Apollo device.  Initially thought we might do a subchondroplasty to a stress fracture that was seen on the medial femoral condyle however she had a diffuse grade 3 lesion there and what I felt like was an isolated grade 4 which is a contraindication for subchondroplasty.  I did gently debride the medial femoral condyle.  The notch was normal then into the lateral compartment she had some mild fraying of the lateral tibial plateau cartilage but nothing unstable she had a central tear lateral meniscus that was resected with motorized shaver and the Apollo device.  I turned my attention patellofemoral joint gently debrided both sides of that joint            At this point everything was thoroughly irrigated it was suctioned all 3 compartments, the gutters the suprapatellar pouch were all evaluated there was no further acute pathology seen.  Everything was thoroughly irrigated it was injected with Marcaine Depo-Medrol.  The portals were closed with 3-0 nylon interrupted fashion.  Sterile dressings and Ace wraps were applied.  The patient tolerated the procedure well and was taken to recovery room in good condition.  All sponge and needle counts were correct.                    Date: 8/27/2024  Time: 09:29 EDT

## 2024-08-27 NOTE — ANESTHESIA PREPROCEDURE EVALUATION
Anesthesia Evaluation     Patient summary reviewed and Nursing notes reviewed   no history of anesthetic complications:   NPO Solid Status: > 8 hours  NPO Liquid Status: > 2 hours           Airway   Mallampati: II  TM distance: >3 FB  Neck ROM: full  Dental      Pulmonary    Cardiovascular         Neuro/Psych  GI/Hepatic/Renal/Endo    (+) obesity, GERD, liver disease fatty liver disease    Musculoskeletal     Abdominal   (+) obese   Substance History      OB/GYN          Other   arthritis,                       Anesthesia Plan    ASA 2     general     intravenous induction     Anesthetic plan, risks, benefits, and alternatives have been provided, discussed and informed consent has been obtained with: patient.        CODE STATUS:

## 2024-09-09 ENCOUNTER — OFFICE VISIT (OUTPATIENT)
Dept: ORTHOPEDIC SURGERY | Facility: CLINIC | Age: 64
End: 2024-09-09
Payer: COMMERCIAL

## 2024-09-09 VITALS — TEMPERATURE: 98.9 F | BODY MASS INDEX: 32.08 KG/M2 | WEIGHT: 187.9 LBS | HEIGHT: 64 IN

## 2024-09-09 DIAGNOSIS — Z98.890 S/P ARTHROSCOPY OF KNEE: Primary | ICD-10-CM

## 2024-09-09 PROCEDURE — 99024 POSTOP FOLLOW-UP VISIT: CPT | Performed by: ORTHOPAEDIC SURGERY

## 2024-09-09 RX ORDER — ACETAMINOPHEN AND CODEINE PHOSPHATE 300; 30 MG/1; MG/1
1 TABLET ORAL EVERY 6 HOURS PRN
Qty: 20 TABLET | Refills: 0 | Status: SHIPPED | OUTPATIENT
Start: 2024-09-09

## 2024-09-09 NOTE — PROGRESS NOTES
Knee Scope follow Up 1st Visit      Patient: Wendy Dunn        YOB: 1960      Chief Complaints: knee pain right      History of Present Illness: Pt is here f/u knee arthroscopy on the right knee she states she doing great her pain is much better she is happy with where she is she has 2 more pain pills left she would like tomorrow told I really like to get my knee scope soft pain medicine at this stage I will be agreeable to give her some Tylenol 3 but I want her to wean off of these and this will be the last prescription      Allergies: No Known Allergies    Medications:   Home Medications:  Current Outpatient Medications on File Prior to Visit   Medication Sig    Calcium Carbonate-Vit D-Min (CALCIUM 1200 PO) Take 1 tablet by mouth Daily. HOLD PRIOR TO SURGERY    HYDROcodone-acetaminophen (NORCO) 5-325 MG per tablet Take 1 tablet by mouth Every 4 (Four) Hours As Needed for Severe Pain.    Omega-3 Fatty Acids (fish oil) 1000 MG capsule capsule Take 1 capsule by mouth Daily With Breakfast. HOLD PRIOR TO SURGERY    rosuvastatin (Crestor) 10 MG tablet Take 1 tablet by mouth Daily. For cholesterol and labs due 3 mos    vitamin B-12 (CYANOCOBALAMIN) 500 MCG tablet Take 1 tablet by mouth Daily. HOLD PRIOR TO SURGERY    vitamin E 400 UNIT capsule Take 1 capsule by mouth Every Morning. HOLD PRIOR TO SURGERY     No current facility-administered medications on file prior to visit.     Current Medications:  Scheduled Meds:  Continuous Infusions:No current facility-administered medications for this visit.    PRN Meds:.          Physical Exam: 64 y.o. female  General Appearance:    Alert, cooperative, in no acute distress                 There were no vitals filed for this visit.   Patient is alert and oriented ×3 no acute distress normal mood physical exam.  Physical exam of the knee, incisions looked good there is no erythema, calf is soft and non-tender.  No sign or sx of DVT      Assessment  S/P knee  scope.  I did review intraoperative findings and arthroscopic pictures with the patient.          Plan: To remove sutures today place Steri-Strips and start into  physical therapy and I will have thrm follow up in 4 weeks.knee arthroscopy on the right knee she states she doing great her pain is much better she is happy with where she is she has 2 more pain pills left she would like tomorrow told I really like to get my knee scope soft pain medicine at this stage I will be agreeable to give her some Tylenol 3 but I want her to wean off of these and this will be the last prescription

## 2024-09-09 NOTE — LETTER
September 9, 2024     Patient: Wendy Dunn   YOB: 1960   Date of Visit: 9/9/2024       To Whom It May Concern:    It is my medical opinion that Wendy Dunn will remain off work at this time.  I will reevaluate her in 4 weeks date of follow-up appointment is   __________  .           Sincerely,        Carmella Whitmore MD    CC:   No Recipients

## 2024-09-13 ENCOUNTER — HOSPITAL ENCOUNTER (EMERGENCY)
Facility: HOSPITAL | Age: 64
Discharge: HOME | End: 2024-09-13
Attending: EMERGENCY MEDICINE
Payer: COMMERCIAL

## 2024-09-13 VITALS
OXYGEN SATURATION: 93 % | HEART RATE: 81 BPM | SYSTOLIC BLOOD PRESSURE: 120 MMHG | RESPIRATION RATE: 20 BRPM | DIASTOLIC BLOOD PRESSURE: 74 MMHG | BODY MASS INDEX: 32.1 KG/M2 | WEIGHT: 188 LBS | HEIGHT: 64 IN | TEMPERATURE: 98.5 F

## 2024-09-13 DIAGNOSIS — S39.012A STRAIN OF LUMBAR REGION, INITIAL ENCOUNTER: Primary | ICD-10-CM

## 2024-09-13 LAB
ALBUMIN SERPL-MCNC: 3.6 G/DL (ref 3.5–5.2)
ALBUMIN/GLOB SERPL: 1.4 G/DL
ALP SERPL-CCNC: 53 U/L (ref 39–117)
ALT SERPL W P-5'-P-CCNC: 8 U/L (ref 1–33)
ANION GAP SERPL CALCULATED.3IONS-SCNC: 9.2 MMOL/L (ref 5–15)
AST SERPL-CCNC: 8 U/L (ref 1–32)
BASOPHILS # BLD AUTO: 0.04 10*3/MM3 (ref 0–0.2)
BASOPHILS NFR BLD AUTO: 0.4 % (ref 0–1.5)
BILIRUB SERPL-MCNC: 0.4 MG/DL (ref 0–1.2)
BUN SERPL-MCNC: 10 MG/DL (ref 8–23)
BUN/CREAT SERPL: 14.9 (ref 7–25)
CALCIUM SPEC-SCNC: 8.6 MG/DL (ref 8.6–10.5)
CHLORIDE SERPL-SCNC: 105 MMOL/L (ref 98–107)
CK SERPL-CCNC: 75 U/L (ref 20–180)
CO2 SERPL-SCNC: 22.8 MMOL/L (ref 22–29)
CREAT SERPL-MCNC: 0.67 MG/DL (ref 0.57–1)
DEPRECATED RDW RBC AUTO: 45.4 FL (ref 37–54)
EGFRCR SERPLBLD CKD-EPI 2021: 98.4 ML/MIN/1.73
EOSINOPHIL # BLD AUTO: 0 10*3/MM3 (ref 0–0.4)
EOSINOPHIL NFR BLD AUTO: 0 % (ref 0.3–6.2)
ERYTHROCYTE [DISTWIDTH] IN BLOOD BY AUTOMATED COUNT: 13.3 % (ref 12.3–15.4)
GLOBULIN UR ELPH-MCNC: 2.5 GM/DL
GLUCOSE SERPL-MCNC: 110 MG/DL (ref 65–99)
HCT VFR BLD AUTO: 39.4 % (ref 34–46.6)
HGB BLD-MCNC: 13.3 G/DL (ref 12–15.9)
IMM GRANULOCYTES # BLD AUTO: 0.03 10*3/MM3 (ref 0–0.05)
IMM GRANULOCYTES NFR BLD AUTO: 0.3 % (ref 0–0.5)
LYMPHOCYTES # BLD AUTO: 1.78 10*3/MM3 (ref 0.7–3.1)
LYMPHOCYTES NFR BLD AUTO: 18.6 % (ref 19.6–45.3)
MAGNESIUM SERPL-MCNC: 1.9 MG/DL (ref 1.6–2.4)
MCH RBC QN AUTO: 31.7 PG (ref 26.6–33)
MCHC RBC AUTO-ENTMCNC: 33.8 G/DL (ref 31.5–35.7)
MCV RBC AUTO: 93.8 FL (ref 79–97)
MONOCYTES # BLD AUTO: 0.92 10*3/MM3 (ref 0.1–0.9)
MONOCYTES NFR BLD AUTO: 9.6 % (ref 5–12)
NEUTROPHILS NFR BLD AUTO: 6.8 10*3/MM3 (ref 1.7–7)
NEUTROPHILS NFR BLD AUTO: 71.1 % (ref 42.7–76)
NRBC BLD AUTO-RTO: 0 /100 WBC (ref 0–0.2)
PLATELET # BLD AUTO: 217 10*3/MM3 (ref 140–450)
PMV BLD AUTO: 9.7 FL (ref 6–12)
POTASSIUM SERPL-SCNC: 3.5 MMOL/L (ref 3.5–5.2)
PROT SERPL-MCNC: 6.1 G/DL (ref 6–8.5)
RBC # BLD AUTO: 4.2 10*6/MM3 (ref 3.77–5.28)
SODIUM SERPL-SCNC: 137 MMOL/L (ref 136–145)
WBC NRBC COR # BLD AUTO: 9.57 10*3/MM3 (ref 3.4–10.8)

## 2024-09-13 PROCEDURE — 82550 ASSAY OF CK (CPK): CPT | Performed by: PHYSICIAN ASSISTANT

## 2024-09-13 PROCEDURE — 85025 COMPLETE CBC W/AUTO DIFF WBC: CPT | Performed by: PHYSICIAN ASSISTANT

## 2024-09-13 PROCEDURE — 25010000002 ONDANSETRON PER 1 MG: Performed by: EMERGENCY MEDICINE

## 2024-09-13 PROCEDURE — 25010000002 MORPHINE PER 10 MG: Performed by: EMERGENCY MEDICINE

## 2024-09-13 PROCEDURE — 80053 COMPREHEN METABOLIC PANEL: CPT | Performed by: PHYSICIAN ASSISTANT

## 2024-09-13 PROCEDURE — 83735 ASSAY OF MAGNESIUM: CPT | Performed by: PHYSICIAN ASSISTANT

## 2024-09-13 PROCEDURE — 96375 TX/PRO/DX INJ NEW DRUG ADDON: CPT

## 2024-09-13 PROCEDURE — 25010000002 KETOROLAC TROMETHAMINE PER 15 MG: Performed by: PHYSICIAN ASSISTANT

## 2024-09-13 PROCEDURE — 96365 THER/PROPH/DIAG IV INF INIT: CPT

## 2024-09-13 PROCEDURE — 99283 EMERGENCY DEPT VISIT LOW MDM: CPT

## 2024-09-13 PROCEDURE — 25010000002 MAGNESIUM SULFATE 2 GM/50ML SOLUTION: Performed by: PHYSICIAN ASSISTANT

## 2024-09-13 RX ORDER — MORPHINE SULFATE 2 MG/ML
4 INJECTION, SOLUTION INTRAMUSCULAR; INTRAVENOUS ONCE
Status: COMPLETED | OUTPATIENT
Start: 2024-09-13 | End: 2024-09-13

## 2024-09-13 RX ORDER — DIAZEPAM 5 MG
5 TABLET ORAL ONCE
Status: COMPLETED | OUTPATIENT
Start: 2024-09-13 | End: 2024-09-13

## 2024-09-13 RX ORDER — KETOROLAC TROMETHAMINE 30 MG/ML
30 INJECTION, SOLUTION INTRAMUSCULAR; INTRAVENOUS ONCE
Status: COMPLETED | OUTPATIENT
Start: 2024-09-13 | End: 2024-09-13

## 2024-09-13 RX ORDER — ONDANSETRON 2 MG/ML
4 INJECTION INTRAMUSCULAR; INTRAVENOUS ONCE
Status: COMPLETED | OUTPATIENT
Start: 2024-09-13 | End: 2024-09-13

## 2024-09-13 RX ORDER — BACLOFEN 10 MG/1
10 TABLET ORAL 3 TIMES DAILY PRN
Qty: 20 TABLET | Refills: 0 | Status: SHIPPED | OUTPATIENT
Start: 2024-09-13

## 2024-09-13 RX ORDER — HYDROCODONE BITARTRATE AND ACETAMINOPHEN 10; 325 MG/1; MG/1
1 TABLET ORAL EVERY 6 HOURS PRN
Status: DISCONTINUED | OUTPATIENT
Start: 2024-09-13 | End: 2024-09-13 | Stop reason: HOSPADM

## 2024-09-13 RX ORDER — MAGNESIUM SULFATE HEPTAHYDRATE 40 MG/ML
2 INJECTION, SOLUTION INTRAVENOUS ONCE
Status: COMPLETED | OUTPATIENT
Start: 2024-09-13 | End: 2024-09-13

## 2024-09-13 RX ORDER — PREDNISONE 50 MG/1
50 TABLET ORAL DAILY
Qty: 7 TABLET | Refills: 0 | Status: SHIPPED | OUTPATIENT
Start: 2024-09-13

## 2024-09-13 RX ADMIN — DIAZEPAM 5 MG: 5 TABLET ORAL at 04:01

## 2024-09-13 RX ADMIN — ONDANSETRON 4 MG: 2 INJECTION, SOLUTION INTRAMUSCULAR; INTRAVENOUS at 03:57

## 2024-09-13 RX ADMIN — MAGNESIUM SULFATE HEPTAHYDRATE 2 G: 40 INJECTION, SOLUTION INTRAVENOUS at 06:20

## 2024-09-13 RX ADMIN — KETOROLAC TROMETHAMINE 30 MG: 30 INJECTION, SOLUTION INTRAMUSCULAR at 04:26

## 2024-09-13 RX ADMIN — MORPHINE SULFATE 4 MG: 2 INJECTION, SOLUTION INTRAMUSCULAR; INTRAVENOUS at 03:57

## 2024-09-13 RX ADMIN — HYDROCODONE BITARTRATE AND ACETAMINOPHEN 1 TABLET: 10; 325 TABLET ORAL at 06:22

## 2024-09-13 NOTE — ED TRIAGE NOTES
Pt arrives to ED via EMS from home with c/o back spasms, has hx of these.  Pt states her back started tightening up around 1200 on 9/12.  Pt rates pain 10/10.

## 2024-09-13 NOTE — ED PROVIDER NOTES
EMERGENCY DEPARTMENT ENCOUNTER  Room Number:  07/07  PCP: Wojciech Garcia MD  Independent Historians: Patient      HPI:  Chief Complaint: had concerns including Back Pain and Spasms.     A complete HPI/ROS/PMH/PSH/SH/FH are unobtainable due to: None    Chronic or social conditions impacting patient care (Social Determinants of Health): None      Context: Wendy Dunn is a 64 y.o. female with a medical history of lumbar radiculopathy, osteoporosis, and GERD presents emergency department today complaining of back spasms that been ongoing all day since around noon yesterday.  She reports the pain is severe and denies palliative factors.  She reports it is worse with any movement.  She has tried a muscle relaxer with no relief.  She says this is never happened before.  She denies a history of back surgeries but says she has had an epidural injection in the past.  She recently had a knee surgery and has been recovering well.  She denies fever or chills.  She denies nausea, vomiting or diarrhea.  She denies urinary symptoms.      Review of prior external notes (non-ED) -and- Review of prior external test results outside of this encounter:   Patient had a meniscus repair on 8/27/2024 with Dr. Whitmore    I reviewed labs from 8/23/2024, hemoglobin 14.5, creatinine 0.73      PAST MEDICAL HISTORY  Active Ambulatory Problems     Diagnosis Date Noted    Osteoporosis 11/18/2015    Pica in adults 11/07/2017    Spinal stenosis in cervical region 12/08/2017    Degeneration of cervical intervertebral disc 12/08/2017    Colon cancer screening 12/27/2017    Pain of upper abdomen 12/01/2020    Gastroesophageal reflux disease 12/01/2020    Nausea and vomiting 12/01/2020    Weight loss 12/01/2020    Fatty liver 05/15/2024    Acute right lumbar radiculopathy 06/15/2024    Complex tear of medial meniscus of right knee as current injury 08/19/2024     Resolved Ambulatory Problems     Diagnosis Date Noted    No Resolved Ambulatory  Problems     Past Medical History:   Diagnosis Date    GERD (gastroesophageal reflux disease)     Hyperlipidemia     Low back pain          PAST SURGICAL HISTORY  Past Surgical History:   Procedure Laterality Date    ANTERIOR CERVICAL DISCECTOMY W/ FUSION N/A 1/12/2018    Procedure: C4 to C6 anterior cervical discectomy, fusion, and instrumentation;  Surgeon: Padilla Saleh MD;  Location: [facility] MAIN OR;  Service:     COLONOSCOPY N/A 3/5/2018    Procedure: COLONOSCOPY TO CECUM AND TI; COLD POLYPECTOMY;  Surgeon: Benita Barnes MD;  Location: [facility] ENDOSCOPY;  Service:     KNEE ARTHROSCOPY Right 8/27/2024    Procedure: KNEE ARTHROSCOPY with partial medial and lateral menisectomy and debridement of arthritis;  Surgeon: Carmella Whitmore MD;  Location: [facility] OR OSC;  Service: Orthopedics;  Laterality: Right;    MAMMO BILATERAL  04/2015    normal    THYROIDECTOMY, PARTIAL      TOTAL ABDOMINAL HYSTERECTOMY  2003         FAMILY HISTORY  Family History   Problem Relation Age of Onset    Stroke Mother     Hypertension Mother     Stroke Father     Stroke Sister     Malig Hyperthermia Neg Hx          SOCIAL HISTORY  Social History     Socioeconomic History    Marital status:    Tobacco Use    Smoking status: Every Day     Current packs/day: 0.50     Average packs/day: 0.5 packs/day for 0.7 years (0.4 ttl pk-yrs)     Types: Cigarettes     Start date: 2024     Last attempt to quit: 2015    Smokeless tobacco: Never   Vaping Use    Vaping status: Never Used   Substance and Sexual Activity    Alcohol use: No    Drug use: No    Sexual activity: Never         ALLERGIES  Patient has no known allergies.      REVIEW OF SYSTEMS  Included in HPI  All systems reviewed and negative except for those discussed in HPI.      PHYSICAL EXAM    I have reviewed the triage vital signs and nursing notes.    ED Triage Vitals [09/13/24 0335]   Temp Heart Rate Resp BP SpO2   98.5 °F (36.9 °C) 95 (!) 40 129/92 96 %      Temp src Heart Rate  Source Patient Position BP Location FiO2 (%)   Oral -- -- -- --       Physical Exam  Constitutional:       General: She is in acute distress.      Comments: In moderate distress, appears extremely uncomfortable   HENT:      Head: Normocephalic and atraumatic.      Nose: Nose normal.      Mouth/Throat:      Mouth: Mucous membranes are moist.   Eyes:      Extraocular Movements: Extraocular movements intact.      Pupils: Pupils are equal, round, and reactive to light.   Cardiovascular:      Rate and Rhythm: Normal rate and regular rhythm.      Pulses: Normal pulses.      Heart sounds: Normal heart sounds.   Pulmonary:      Effort: Pulmonary effort is normal. No respiratory distress.      Breath sounds: Normal breath sounds.   Abdominal:      General: Abdomen is flat. There is no distension.      Palpations: Abdomen is soft.      Tenderness: There is no abdominal tenderness.   Musculoskeletal:         General: Normal range of motion.      Cervical back: Normal range of motion and neck supple.      Comments: Writhing in pain, muscle tension in the bilateral paraspinal musculature, left worse than right, pain is actually improved with deep palpation of the musculature.  No midline tenderness to palpation.  5 out of 5 strength with plantar dorsiflexion.  Limbs are warm and well-perfused.  2+ DP pulses bilaterally.   Skin:     General: Skin is warm and dry.      Capillary Refill: Capillary refill takes less than 2 seconds.   Neurological:      General: No focal deficit present.      Mental Status: She is alert and oriented to person, place, and time.   Psychiatric:         Mood and Affect: Mood normal.         Behavior: Behavior normal.           MEDICATIONS GIVEN IN ER  Medications   diazePAM (VALIUM) tablet 5 mg (5 mg Oral Given 9/13/24 0401)   morphine injection 4 mg (4 mg Intravenous Given 9/13/24 0357)   ondansetron (ZOFRAN) injection 4 mg (4 mg Intravenous Given 9/13/24 0357)   ketorolac (TORADOL) injection 30 mg (30  mg Intravenous Given 9/13/24 0426)   magnesium sulfate 2g/50 mL (PREMIX) infusion (0 g Intravenous Stopped 9/13/24 1907)           OUTPATIENT MEDICATION MANAGEMENT:  No current Epic-ordered facility-administered medications on file.     Current Outpatient Medications Ordered in Epic   Medication Sig Dispense Refill    acetaminophen-codeine (TYLENOL with CODEINE #3) 300-30 MG per tablet Take 1 tablet by mouth Every 6 (Six) Hours As Needed for Moderate Pain or Severe Pain. 20 tablet 0    baclofen (LIORESAL) 10 MG tablet Take 1 tablet by mouth 3 (Three) Times a Day As Needed for Muscle Spasms. 20 tablet 0    Calcium Carbonate-Vit D-Min (CALCIUM 1200 PO) Take 1 tablet by mouth Daily. HOLD PRIOR TO SURGERY      HYDROcodone-acetaminophen (NORCO) 5-325 MG per tablet Take 1 tablet by mouth Every 4 (Four) Hours As Needed for Severe Pain. 28 tablet 0    Omega-3 Fatty Acids (fish oil) 1000 MG capsule capsule Take 1 capsule by mouth Daily With Breakfast. HOLD PRIOR TO SURGERY      predniSONE (DELTASONE) 50 MG tablet Take 1 tablet by mouth Daily. 7 tablet 0    rosuvastatin (Crestor) 10 MG tablet Take 1 tablet by mouth Daily. For cholesterol and labs due 3 mos 30 tablet 11    vitamin B-12 (CYANOCOBALAMIN) 500 MCG tablet Take 1 tablet by mouth Daily. HOLD PRIOR TO SURGERY      vitamin E 400 UNIT capsule Take 1 capsule by mouth Every Morning. HOLD PRIOR TO SURGERY             PROGRESS, DATA ANALYSIS, CONSULTS, AND MEDICAL DECISION MAKING  ORDERS PLACED DURING THIS VISIT:  Orders Placed This Encounter   Procedures    Comprehensive Metabolic Panel    CK    Magnesium    CBC Auto Differential    CBC & Differential       All labs have been independently interpreted by me.  All radiology studies have been reviewed by me. All EKG's have been independently viewed and interpreted by me.  Discussion below represents my analysis of pertinent findings related to patient's condition, differential diagnosis, treatment plan and final  disposition.    Differential diagnosis includes but is not limited to:   My differential diagnosis for back pain includes but is not limited to:  Musculoskeletal strain, contusion, retroperitoneal hematoma, disc protrusion, vertebral fracture, transverse process fracture, rib fracture, facet syndrome, sacroiliac joint strain, sciatica, renal injury, splenic injury, pancreatic injury, osteoarthritis, lumbar spondylosis, spinal stenosis, ankylosing spondylitis, sacroiliac joint inflammation, pancreatitis, perforated peptic ulcer, diverticulitis, endometriosis, chronic PID, epidural abscess, osteomyelitis, retroperitoneal abscess, pyelonephritis, pneumonia, subphrenic abscess, tuberculosis, neurofibroma, meningioma, multiple myeloma, lymphoma, metastatic cancer, primary cancer, AAA, aortic dissection, spinal ischemia, referred pain, ureterolithiasis      ED Course:  ED Course as of 09/14/24 0025   Fri Sep 13, 2024   0343 I discussed the case with Dr. Escobedo and he agrees to evaluate the patient at the bedside.    [CC]   0442 Reevaluated patient, pain is much improved.  Will continue to monitor. [CC]   0552 Patient attempted to ambulate to the bathroom but pain was severe and she can only make it to the bedside commode.  Will give a dose of Percocet and if pain is not improved we will plan to admit for pain control. [CC]   0712 Creatine Kinase: 75 [EE]   0712 Creatinine: 0.67 [EE]   0712 WBC: 9.57 [EE]   0712 Magnesium: 1.9 [EE]   0741 Recheck of patient.  She is feeling much better.  She is able to ambulate without any difficulty.  No radicular symptoms or neurodeficits.  Will discharge. [EE]      ED Course User Index  [CC] Roslyn Steele PA-C  [EE] Simone Yee PA           AS OF 00:25 EDT VITALS:    BP - 120/74  HR - 81  TEMP - 98.5 °F (36.9 °C) (Oral)  O2 SATS - 93%      MDM:  Patient is a 64-year-old female presents emergency department today with back spasms.  On arrival here in the emergency department  vitals are reassuring with the exception of tachypnea.  She is afebrile.  On my exam the patient is extremely uncomfortable and has palpable muscle spasm in the bilateral lumbar and thoracic paraspinal musculature.  Patient was initially treated with Valium, morphine, and Toradol with significant improvement of the pain.  She did try to ambulate but pain then returned.  She was then evaluated with labs to rule out electrolyte abnormality or rhabdomyolysis.  Labs were reassuring and she was treated with another dose of pain medication.  On reevaluation patient was ambulatory and pain had improved.  She was to be discharged home with muscle relaxers and steroids she is stable at time of discharge.        DIAGNOSIS  Final diagnoses:   Strain of lumbar region, initial encounter         DISPOSITION  ED Disposition       ED Disposition   Discharge    Condition   Stable    Comment   --                      Please note that portions of this document were completed with a voice recognition program.    Note Disclaimer: At [facility], we believe that sharing information builds trust and better relationships. You are receiving this note because you recently visited [facility]. It is possible you will see health information before a provider has talked with you about it. This kind of information can be easy to misunderstand. To help you fully understand what it means for your health, we urge you to discuss this note with your provider.     Roslyn Steele PA-C  09/14/24 0026

## 2024-09-13 NOTE — ED PROVIDER NOTES
MD ATTESTATION NOTE    SHARED VISIT: This visit was performed by BOTH a physician and an APC. The substantive portion of the medical decision making was performed by this attesting physician who made or approved the management plan and takes responsibility for patient management. All studies documented in the APC note (if performed) were independently interpreted by me.    The LISSETT and I have discussed this patient's history, physical exam, MDM, and treatment plan.  I have reviewed the documentation and personally had a face to face interaction with the patient. The attached note describes my personal findings.      Wendy Dunn is a 64 y.o. female who presents to the ED c/o acute back spasms since yesterday.  Patient states that before.  No tingling no numbness no weakness.    On exam:  GENERAL: not distressed  HENT: nares patent  EYES: no scleral icterus  CV: regular rhythm, regular rate  RESPIRATORY: normal effort  ABDOMEN: soft  MUSCULOSKELETAL: no deformity  NEURO: alert, moves all extremities, follows commands  SKIN: warm, dry    Labs  No results found for this or any previous visit (from the past 24 hour(s)).    Radiology  No Radiology Exams Resulted Within Past 24 Hours    Medications given in the ED:  Medications   diazePAM (VALIUM) tablet 5 mg (5 mg Oral Given 9/13/24 0401)   morphine injection 4 mg (4 mg Intravenous Given 9/13/24 0357)   ondansetron (ZOFRAN) injection 4 mg (4 mg Intravenous Given 9/13/24 0357)   ketorolac (TORADOL) injection 30 mg (30 mg Intravenous Given 9/13/24 0426)   magnesium sulfate 2g/50 mL (PREMIX) infusion (0 g Intravenous Stopped 9/13/24 0707)       Orders placed during this visit:  Orders Placed This Encounter   Procedures    Comprehensive Metabolic Panel    CK    Magnesium    CBC Auto Differential    CBC & Differential       Medical Decision Making:  ED Course as of 09/14/24 0625   Fri Sep 13, 2024   0343 I discussed the case with Dr. Escobedo and he agrees to evaluate the  patient at the bedside.    [CC]   0442 Reevaluated patient, pain is much improved.  Will continue to monitor. [CC]   0552 Patient attempted to ambulate to the bathroom but pain was severe and she can only make it to the bedside commode.  Will give a dose of Percocet and if pain is not improved we will plan to admit for pain control. [CC]   0712 Creatine Kinase: 75 [EE]   0712 Creatinine: 0.67 [EE]   0712 WBC: 9.57 [EE]   0712 Magnesium: 1.9 [EE]   0741 Recheck of patient.  She is feeling much better.  She is able to ambulate without any difficulty.  No radicular symptoms or neurodeficits.  Will discharge. [EE]      ED Course User Index  [CC] Roslyn Steele PA-C  [EE] Simone Yee, PA       Differential diagnosis:  My differential diagnosis for back pain includes but is not limited to:  Musculoskeletal strain, contusion, retroperitoneal hematoma, disc protrusion, vertebral fracture, transverse process fracture, rib fracture, facet syndrome, sacroiliac joint strain, sciatica, renal injury, splenic injury, pancreatic injury, osteoarthritis, lumbar spondylosis, spinal stenosis, ankylosing spondylitis, sacroiliac joint inflammation, pancreatitis, perforated peptic ulcer, diverticulitis, endometriosis, chronic PID, epidural abscess, osteomyelitis, retroperitoneal abscess, pyelonephritis, pneumonia, subphrenic abscess, tuberculosis, neurofibroma, meningioma, multiple myeloma, lymphoma, metastatic cancer, primary cancer, AAA, aortic dissection, spinal ischemia, referred pain, ureterolithiasis      Diagnosis  Final diagnoses:   Strain of lumbar region, initial encounter          Álvaro Escobedo MD  09/13/24 0557       Álvaro Escobedo MD  09/14/24 0671

## 2024-09-26 ENCOUNTER — OFFICE VISIT (OUTPATIENT)
Dept: FAMILY MEDICINE CLINIC | Facility: CLINIC | Age: 64
End: 2024-09-26
Payer: COMMERCIAL

## 2024-09-26 VITALS
WEIGHT: 190 LBS | OXYGEN SATURATION: 98 % | DIASTOLIC BLOOD PRESSURE: 80 MMHG | SYSTOLIC BLOOD PRESSURE: 120 MMHG | HEIGHT: 64 IN | HEART RATE: 84 BPM | BODY MASS INDEX: 32.44 KG/M2

## 2024-09-26 DIAGNOSIS — S39.012D STRAIN OF LUMBAR REGION, SUBSEQUENT ENCOUNTER: Primary | ICD-10-CM

## 2024-09-26 DIAGNOSIS — H61.23 EXCESSIVE CERUMEN IN BOTH EAR CANALS: ICD-10-CM

## 2024-09-26 DIAGNOSIS — Z09 HOSPITAL DISCHARGE FOLLOW-UP: ICD-10-CM

## 2024-09-26 PROCEDURE — 99213 OFFICE O/P EST LOW 20 MIN: CPT | Performed by: FAMILY MEDICINE

## 2024-09-26 RX ORDER — CYCLOBENZAPRINE HCL 10 MG
10 TABLET ORAL 2 TIMES DAILY PRN
Qty: 60 TABLET | Refills: 2 | Status: SHIPPED | OUTPATIENT
Start: 2024-09-26

## 2024-09-26 RX ORDER — DICLOFENAC SODIUM 75 MG/1
75 TABLET, DELAYED RELEASE ORAL 2 TIMES DAILY
Qty: 60 TABLET | Refills: 2 | Status: SHIPPED | OUTPATIENT
Start: 2024-09-26

## 2024-10-04 NOTE — PROGRESS NOTES
Knee Scope follow Up       Patient: Wendy Dunn        YOB: 1960      Chief Complaints: knee pain right      History of Present Illness: Pt is here f/u knee arthroscopy on the right knee she states she doing well she has been trying to walk a lot more does have pain at the end of the walk.  We talked about the fact that walking probably is not going to be her best mode of exercise perhaps the bike would be better        Allergies: No Known Allergies    Medications:   Home Medications:  Current Outpatient Medications on File Prior to Visit   Medication Sig    acetaminophen-codeine (TYLENOL with CODEINE #3) 300-30 MG per tablet Take 1 tablet by mouth Every 6 (Six) Hours As Needed for Moderate Pain or Severe Pain.    Calcium Carbonate-Vit D-Min (CALCIUM 1200 PO) Take 1 tablet by mouth Daily. HOLD PRIOR TO SURGERY    cyclobenzaprine (FLEXERIL) 10 MG tablet Take 1 tablet by mouth 2 (Two) Times a Day As Needed for Muscle Spasms.    diclofenac (VOLTAREN) 75 MG EC tablet Take 1 tablet by mouth 2 (Two) Times a Day.    HYDROcodone-acetaminophen (NORCO) 5-325 MG per tablet Take 1 tablet by mouth Every 4 (Four) Hours As Needed for Severe Pain.    Omega-3 Fatty Acids (fish oil) 1000 MG capsule capsule Take 1 capsule by mouth Daily With Breakfast. HOLD PRIOR TO SURGERY    rosuvastatin (Crestor) 10 MG tablet Take 1 tablet by mouth Daily. For cholesterol and labs due 3 mos    vitamin B-12 (CYANOCOBALAMIN) 500 MCG tablet Take 1 tablet by mouth Daily. HOLD PRIOR TO SURGERY    vitamin E 400 UNIT capsule Take 1 capsule by mouth Every Morning. HOLD PRIOR TO SURGERY     No current facility-administered medications on file prior to visit.     Current Medications:  Scheduled Meds:  Continuous Infusions:No current facility-administered medications for this visit.    PRN Meds:.          Physical Exam: 64 y.o. female  General Appearance:    Alert, cooperative, in no acute distress                 There were no vitals filed  for this visit.   Patient is alert and oriented ×3 no acute distress normal mood physical exam.  Physical exam of the knee, incisions looked good there is no erythema, calf is soft and non-tender.  No sign or sx of DVT      Assessment  S/P knee scope.  Overall doing well.         Plan: Continue with strengthening, progression of activities OA web  medial/lateral was fit for stability during ambulation to offload compartment pressure    In view of her degenerative changes medially I think  might offer her some tolerance to that we did fit her with a

## 2024-10-07 ENCOUNTER — OFFICE VISIT (OUTPATIENT)
Dept: ORTHOPEDIC SURGERY | Facility: CLINIC | Age: 64
End: 2024-10-07
Payer: COMMERCIAL

## 2024-10-07 VITALS — TEMPERATURE: 97.9 F | BODY MASS INDEX: 32.33 KG/M2 | HEIGHT: 64 IN | WEIGHT: 189.4 LBS

## 2024-10-07 DIAGNOSIS — Z98.890 S/P ARTHROSCOPY OF KNEE: Primary | ICD-10-CM

## 2024-10-07 DIAGNOSIS — M17.11 PRIMARY LOCALIZED OSTEOARTHROSIS OF RIGHT LOWER LEG: ICD-10-CM

## 2024-10-07 PROCEDURE — 99024 POSTOP FOLLOW-UP VISIT: CPT | Performed by: ORTHOPAEDIC SURGERY

## 2024-10-07 NOTE — LETTER
October 7, 2024     Patient: Wendy Dunn   YOB: 1960   Date of Visit: 10/7/2024       To Whom It May Concern:    It is my medical opinion that Wendy Dunn will be off work for at least the next month.  I will reevaluate her in 4 weeks.           Sincerely,        Carmella Whitmore MD    CC:   No Recipients

## 2024-10-15 ENCOUNTER — TRANSCRIBE ORDERS (OUTPATIENT)
Dept: ADMINISTRATIVE | Facility: HOSPITAL | Age: 64
End: 2024-10-15
Payer: COMMERCIAL

## 2024-10-15 DIAGNOSIS — Z12.31 VISIT FOR SCREENING MAMMOGRAM: Primary | ICD-10-CM

## 2024-11-04 ENCOUNTER — OFFICE VISIT (OUTPATIENT)
Dept: ORTHOPEDIC SURGERY | Facility: CLINIC | Age: 64
End: 2024-11-04
Payer: COMMERCIAL

## 2024-11-04 VITALS — WEIGHT: 185.1 LBS | BODY MASS INDEX: 32.8 KG/M2 | TEMPERATURE: 98.6 F | HEIGHT: 63 IN

## 2024-11-04 DIAGNOSIS — Z98.890 S/P ARTHROSCOPY OF KNEE: Primary | ICD-10-CM

## 2024-11-04 PROCEDURE — 99024 POSTOP FOLLOW-UP VISIT: CPT | Performed by: ORTHOPAEDIC SURGERY

## 2024-11-04 NOTE — PROGRESS NOTES
Knee Scope follow Up       Patient: Wendy Dunn        YOB: 1960      Chief Complaints: knee pain right      History of Present Illness: Pt is here f/u knee arthroscopy on the right she states she is doing better still has some swelling but overall is much better than she was before surgery        Allergies: No Known Allergies    Medications:   Home Medications:  Current Outpatient Medications on File Prior to Visit   Medication Sig    acetaminophen-codeine (TYLENOL with CODEINE #3) 300-30 MG per tablet Take 1 tablet by mouth Every 6 (Six) Hours As Needed for Moderate Pain or Severe Pain. (Patient not taking: Reported on 10/7/2024)    Calcium Carbonate-Vit D-Min (CALCIUM 1200 PO) Take 1 tablet by mouth Daily. HOLD PRIOR TO SURGERY    cyclobenzaprine (FLEXERIL) 10 MG tablet Take 1 tablet by mouth 2 (Two) Times a Day As Needed for Muscle Spasms.    diclofenac (VOLTAREN) 75 MG EC tablet Take 1 tablet by mouth 2 (Two) Times a Day. (Patient not taking: Reported on 10/7/2024)    HYDROcodone-acetaminophen (NORCO) 5-325 MG per tablet Take 1 tablet by mouth Every 4 (Four) Hours As Needed for Severe Pain.    Omega-3 Fatty Acids (fish oil) 1000 MG capsule capsule Take 1 capsule by mouth Daily With Breakfast. HOLD PRIOR TO SURGERY    rosuvastatin (Crestor) 10 MG tablet Take 1 tablet by mouth Daily. For cholesterol and labs due 3 mos    vitamin B-12 (CYANOCOBALAMIN) 500 MCG tablet Take 1 tablet by mouth Daily. HOLD PRIOR TO SURGERY    vitamin E 400 UNIT capsule Take 1 capsule by mouth Every Morning. HOLD PRIOR TO SURGERY     No current facility-administered medications on file prior to visit.     Current Medications:  Scheduled Meds:  Continuous Infusions:No current facility-administered medications for this visit.    PRN Meds:.          Physical Exam: 64 y.o. female  General Appearance:    Alert, cooperative, in no acute distress                 There were no vitals filed for this visit.   Patient is alert  and oriented ×3 no acute distress normal mood physical exam.  Physical exam of the knee, incisions looked good there is no erythema, calf is soft and non-tender.  No sign or sx of DVT      Assessment  S/P knee scope.  Overall doing well.         Plan: Continue with strengthening, progression of activities she is doing better I want her to continue with her exercise on a home program basis and I will see her as needed we can always do an injection in the future if needed

## 2024-12-02 ENCOUNTER — OFFICE VISIT (OUTPATIENT)
Dept: ORTHOPEDIC SURGERY | Facility: CLINIC | Age: 64
End: 2024-12-02
Payer: COMMERCIAL

## 2024-12-02 VITALS — WEIGHT: 188.2 LBS | BODY MASS INDEX: 33.35 KG/M2 | TEMPERATURE: 99.3 F | HEIGHT: 63 IN

## 2024-12-02 DIAGNOSIS — M17.11 PRIMARY OSTEOARTHRITIS OF RIGHT KNEE: Primary | ICD-10-CM

## 2024-12-02 PROCEDURE — 20610 DRAIN/INJ JOINT/BURSA W/O US: CPT | Performed by: ORTHOPAEDIC SURGERY

## 2024-12-02 RX ORDER — METHYLPREDNISOLONE ACETATE 80 MG/ML
80 INJECTION, SUSPENSION INTRA-ARTICULAR; INTRALESIONAL; INTRAMUSCULAR; SOFT TISSUE
Status: COMPLETED | OUTPATIENT
Start: 2024-12-02 | End: 2024-12-02

## 2024-12-02 RX ORDER — LIDOCAINE HYDROCHLORIDE 10 MG/ML
2 INJECTION, SOLUTION EPIDURAL; INFILTRATION; INTRACAUDAL; PERINEURAL
Status: COMPLETED | OUTPATIENT
Start: 2024-12-02 | End: 2024-12-02

## 2024-12-02 RX ADMIN — METHYLPREDNISOLONE ACETATE 80 MG: 80 INJECTION, SUSPENSION INTRA-ARTICULAR; INTRALESIONAL; INTRAMUSCULAR; SOFT TISSUE at 14:31

## 2024-12-02 RX ADMIN — LIDOCAINE HYDROCHLORIDE 2 ML: 10 INJECTION, SOLUTION EPIDURAL; INFILTRATION; INTRACAUDAL; PERINEURAL at 14:31

## 2024-12-02 NOTE — PROGRESS NOTES
Knee Scope follow Up       Patient: Wendy Dunn        YOB: 1960      Chief Complaints: knee pain right      History of Present Illness: Pt is here f/u knee arthroscopy on the right she did have some degenerative changes she is doing well she is much better than she was before surgery still having some pain and did have some arthritis some surgery we talked about doing another injection she would like to try that      Allergies: No Known Allergies    Medications:   Home Medications:  Current Outpatient Medications on File Prior to Visit   Medication Sig    acetaminophen-codeine (TYLENOL with CODEINE #3) 300-30 MG per tablet Take 1 tablet by mouth Every 6 (Six) Hours As Needed for Moderate Pain or Severe Pain.    Calcium Carbonate-Vit D-Min (CALCIUM 1200 PO) Take 1 tablet by mouth Daily. HOLD PRIOR TO SURGERY    cyclobenzaprine (FLEXERIL) 10 MG tablet Take 1 tablet by mouth 2 (Two) Times a Day As Needed for Muscle Spasms.    diclofenac (VOLTAREN) 75 MG EC tablet Take 1 tablet by mouth 2 (Two) Times a Day.    HYDROcodone-acetaminophen (NORCO) 5-325 MG per tablet Take 1 tablet by mouth Every 4 (Four) Hours As Needed for Severe Pain.    Omega-3 Fatty Acids (fish oil) 1000 MG capsule capsule Take 1 capsule by mouth Daily With Breakfast. HOLD PRIOR TO SURGERY    rosuvastatin (Crestor) 10 MG tablet Take 1 tablet by mouth Daily. For cholesterol and labs due 3 mos    vitamin B-12 (CYANOCOBALAMIN) 500 MCG tablet Take 1 tablet by mouth Daily. HOLD PRIOR TO SURGERY    vitamin E 400 UNIT capsule Take 1 capsule by mouth Every Morning. HOLD PRIOR TO SURGERY     No current facility-administered medications on file prior to visit.     Current Medications:  Scheduled Meds:  Continuous Infusions:No current facility-administered medications for this visit.    PRN Meds:.          Physical Exam: 64 y.o. female  General Appearance:    Alert, cooperative, in no acute distress                 There were no vitals filed  for this visit.   Patient is alert and oriented ×3 no acute distress normal mood physical exam.  Physical exam of the knee, incisions looked good there is no erythema, calf is soft and non-tender.  No sign or sx of DVT  No effusion no redness good range of motion    Assessment  S/P knee scope.  Overall doing well.  She did have some arthritis I think that is probably what is bothering her now we talked about injections I think that is a reasonable thing to do         Plan: Continue with strengthening, progression of activities    Large Joint Arthrocentesis: R knee  Date/Time: 12/2/2024 2:31 PM  Consent given by: patient  Site marked: site marked  Timeout: Immediately prior to procedure a time out was called to verify the correct patient, procedure, equipment, support staff and site/side marked as required   Supporting Documentation  Indications: pain   Procedure Details  Location: knee - R knee  Preparation: Patient was prepped and draped in the usual sterile fashion  Needle gauge: 21G.  Approach: medial  Medications administered: 80 mg methylPREDNISolone acetate 80 MG/ML; 2 mL lidocaine PF 1% 1 %  Patient tolerance: patient tolerated the procedure well with no immediate complications

## 2024-12-10 ENCOUNTER — HOSPITAL ENCOUNTER (OUTPATIENT)
Dept: MAMMOGRAPHY | Facility: HOSPITAL | Age: 64
Discharge: HOME | End: 2024-12-10
Admitting: PHYSICIAN ASSISTANT
Payer: COMMERCIAL

## 2024-12-10 DIAGNOSIS — Z12.31 VISIT FOR SCREENING MAMMOGRAM: ICD-10-CM

## 2024-12-10 PROCEDURE — 77063 BREAST TOMOSYNTHESIS BI: CPT

## 2024-12-10 PROCEDURE — 77067 SCR MAMMO BI INCL CAD: CPT

## 2024-12-11 ENCOUNTER — TELEPHONE (OUTPATIENT)
Dept: FAMILY MEDICINE CLINIC | Facility: CLINIC | Age: 64
End: 2024-12-11
Payer: COMMERCIAL

## 2024-12-11 NOTE — TELEPHONE ENCOUNTER
Caller: Wendy Dunn    Relationship to patient: Self    Best call back number: [phone]      Patient is needing: THIS MESSAGE IS FOR DANA IYER.  SHE WOULD LIKE TO KNOW WHAT MEDICATION YOU WERE GOING TO PRESCRIBE SO SHE CAN CALL HER INSURANCE COMPANY.

## 2024-12-13 NOTE — TELEPHONE ENCOUNTER
HUB TO RELAY    [facility] ADVISING PT MRI SUGGESTED R/T MAMMO FINDINGS. PT IS TO [employer] INSURANCE AND FIND OUT IF MRI WOULD BE COVERED.    NO MENTION OF ANY MEDS!

## 2024-12-16 ENCOUNTER — TELEPHONE (OUTPATIENT)
Dept: ORTHOPEDIC SURGERY | Facility: CLINIC | Age: 64
End: 2024-12-16

## 2024-12-16 NOTE — TELEPHONE ENCOUNTER
Caller: Wendy Dunn    Relationship: Self    Best call back number: [phone]    What form or medical record are you requesting: NOTE FOR EMPLOYER TO KEEP PATIENT OFF WORK UNTIL NEXT APPT FOR RT KNEE, WHICH IS 1.14.25    Who is requesting this form or medical record from you: PATIENT    How would you like to receive the form or medical records (pick-up, mail, fax): UPLOAD TO [facility]

## 2024-12-17 NOTE — TELEPHONE ENCOUNTER
She has already been off for 4 months for a knee scope can we find her an appointment sooner maybe even this afternoon?  Or on the 26

## 2024-12-18 NOTE — TELEPHONE ENCOUNTER
Caller: Wendy Dunn    Relationship to patient: Self    Best call back number: [phone] (home)       Patient is needing: PATIENT RETURNED CALL ATTEMPTED TO TRANSFER TO OFFICE BUT WAS UNSUCCESSFUL. PATIENT CURRENTLY HAS AN APPOINTMENT FOR 01/14/24.10PM

## 2024-12-26 ENCOUNTER — OFFICE VISIT (OUTPATIENT)
Dept: ORTHOPEDIC SURGERY | Facility: CLINIC | Age: 64
End: 2024-12-26
Payer: COMMERCIAL

## 2024-12-26 VITALS — BODY MASS INDEX: 33.06 KG/M2 | TEMPERATURE: 97.7 F | WEIGHT: 186.6 LBS | HEIGHT: 63 IN

## 2024-12-26 DIAGNOSIS — Z98.890 S/P ARTHROSCOPY OF KNEE: Primary | ICD-10-CM

## 2024-12-26 NOTE — PROGRESS NOTES
Knee Scope follow Up       Patient: Wendy Dunn        YOB: 1960      Chief Complaints: knee pain right      History of Present Illness: Pt is here f/u knee arthroscopy on the right knee she did have some arthritis when I saw her last we did inject her she states the injection did not help at all but couple weeks later she did have improvement in her symptoms she feels like she can return to work as scheduled I still think the injection helped a little bit      Allergies: No Known Allergies    Medications:   Home Medications:  Current Outpatient Medications on File Prior to Visit   Medication Sig    acetaminophen-codeine (TYLENOL with CODEINE #3) 300-30 MG per tablet Take 1 tablet by mouth Every 6 (Six) Hours As Needed for Moderate Pain or Severe Pain. (Patient not taking: Reported on 12/26/2024)    Calcium Carbonate-Vit D-Min (CALCIUM 1200 PO) Take 1 tablet by mouth Daily. HOLD PRIOR TO SURGERY    cyclobenzaprine (FLEXERIL) 10 MG tablet Take 1 tablet by mouth 2 (Two) Times a Day As Needed for Muscle Spasms. (Patient not taking: Reported on 12/26/2024)    diclofenac (VOLTAREN) 75 MG EC tablet Take 1 tablet by mouth 2 (Two) Times a Day.    HYDROcodone-acetaminophen (NORCO) 5-325 MG per tablet Take 1 tablet by mouth Every 4 (Four) Hours As Needed for Severe Pain. (Patient not taking: Reported on 12/26/2024)    Omega-3 Fatty Acids (fish oil) 1000 MG capsule capsule Take 1 capsule by mouth Daily With Breakfast. HOLD PRIOR TO SURGERY    rosuvastatin (Crestor) 10 MG tablet Take 1 tablet by mouth Daily. For cholesterol and labs due 3 mos    vitamin B-12 (CYANOCOBALAMIN) 500 MCG tablet Take 1 tablet by mouth Daily. HOLD PRIOR TO SURGERY    vitamin E 400 UNIT capsule Take 1 capsule by mouth Every Morning. HOLD PRIOR TO SURGERY     No current facility-administered medications on file prior to visit.     Current Medications:  Scheduled Meds:  Continuous Infusions:No current facility-administered medications  "for this visit.    PRN Meds:.          Physical Exam: 64 y.o. female  General Appearance:    Alert, cooperative, in no acute distress                   Vitals:    12/26/24 1411   Temp: 97.7 °F (36.5 °C)   TempSrc: Temporal   Weight: 84.6 kg (186 lb 9.6 oz)   Height: 160 cm (63\")   PainSc:   1   PainLoc: Knee      Patient is alert and oriented ×3 no acute distress normal mood physical exam.  Physical exam of the knee, incisions looked good there is no erythema, calf is soft and non-tender.  No sign or sx of DVT      Assessment  S/P knee scope.  Overall doing well.         Plan: Continue with strengthening, progression of activities she knows she has some arthritis in her knees she knows strengthening of her quads and core and Maintaining ideal body weight will be the best ways to prolong the life of her knee she understands her options in the future repeat injections ice anti-inflammatory activity modification ultimately arthroplasty             "

## 2025-01-23 ENCOUNTER — OFFICE VISIT (OUTPATIENT)
Dept: FAMILY MEDICINE CLINIC | Facility: CLINIC | Age: 65
End: 2025-01-23
Payer: COMMERCIAL

## 2025-01-23 VITALS
TEMPERATURE: 97.7 F | OXYGEN SATURATION: 98 % | RESPIRATION RATE: 16 BRPM | HEIGHT: 63 IN | DIASTOLIC BLOOD PRESSURE: 86 MMHG | HEART RATE: 100 BPM | BODY MASS INDEX: 32.96 KG/M2 | SYSTOLIC BLOOD PRESSURE: 128 MMHG | WEIGHT: 186 LBS

## 2025-01-23 DIAGNOSIS — M72.2 PLANTAR FASCIITIS OF LEFT FOOT: ICD-10-CM

## 2025-01-23 DIAGNOSIS — M77.8 TENDINITIS OF LEFT SHOULDER: Primary | ICD-10-CM

## 2025-01-23 PROCEDURE — 99213 OFFICE O/P EST LOW 20 MIN: CPT | Performed by: FAMILY MEDICINE

## 2025-01-23 NOTE — PROGRESS NOTES
"Pedro Dunn is a 64 y.o. female.     CC: Shoulder Pain    History of Present Illness     Patient comes in today reporting pain of the left shoulder that she has had previously, has received injections through orthopedics with good relief, and would like to get back over to get that done.    She has also had a greater than 1 week history of left heel pain, worse when she gets out of bed in the morning and puts her foot on the ground.  She does report recently getting some better shoes with orthotics.      The following portions of the patient's history were reviewed and updated as appropriate: allergies, current medications, past family history, past medical history, past social history, past surgical history, and problem list.    Review of Systems   Constitutional:  Negative for activity change, chills and fever.   Respiratory:  Negative for cough.    Cardiovascular:  Negative for chest pain.   Psychiatric/Behavioral:  Negative for dysphoric mood.        /86   Pulse 100   Temp 97.7 °F (36.5 °C) (Oral)   Resp 16   Ht 160 cm (63\")   Wt 84.4 kg (186 lb)   SpO2 98%   BMI 32.95 kg/m²     Objective   Physical Exam  Vitals and nursing note reviewed.   Constitutional:       General: She is not in acute distress.     Appearance: She is well-developed.   Pulmonary:      Effort: Pulmonary effort is normal.   Musculoskeletal:         General: Tenderness (of the proximal left heel with palpation) present.   Neurological:      Mental Status: She is alert and oriented to person, place, and time.   Psychiatric:         Behavior: Behavior normal.         Thought Content: Thought content normal.         Assessment & Plan   Diagnoses and all orders for this visit:    1. Tendinitis of left shoulder (Primary)  -     Ambulatory Referral to Orthopedic Surgery    2. Plantar fasciitis of left foot    Handout given, Voltaren gel discussed, orthotics stressed, and stretching recommended.             "

## 2025-02-19 ENCOUNTER — TELEPHONE (OUTPATIENT)
Dept: FAMILY MEDICINE CLINIC | Facility: CLINIC | Age: 65
End: 2025-02-19
Payer: COMMERCIAL

## 2025-02-19 ENCOUNTER — APPOINTMENT (OUTPATIENT)
Dept: CT IMAGING | Facility: HOSPITAL | Age: 65
End: 2025-02-19
Payer: COMMERCIAL

## 2025-02-19 ENCOUNTER — HOSPITAL ENCOUNTER (OUTPATIENT)
Facility: HOSPITAL | Age: 65
Setting detail: OBSERVATION
Discharge: HOME | End: 2025-02-21
Attending: STUDENT IN AN ORGANIZED HEALTH CARE EDUCATION/TRAINING PROGRAM | Admitting: EMERGENCY MEDICINE
Payer: COMMERCIAL

## 2025-02-19 DIAGNOSIS — M48.062 SPINAL STENOSIS OF LUMBAR REGION WITH NEUROGENIC CLAUDICATION: ICD-10-CM

## 2025-02-19 DIAGNOSIS — M54.41 ACUTE MIDLINE LOW BACK PAIN WITH RIGHT-SIDED SCIATICA: ICD-10-CM

## 2025-02-19 DIAGNOSIS — M54.17 RADICULOPATHY OF LUMBOSACRAL REGION: Primary | ICD-10-CM

## 2025-02-19 PROBLEM — M54.30 SCIATICA: Status: ACTIVE | Noted: 2025-02-19

## 2025-02-19 LAB
ALBUMIN SERPL-MCNC: 3.9 G/DL (ref 3.5–5.2)
ALBUMIN/GLOB SERPL: 1.2 G/DL
ALP SERPL-CCNC: 64 U/L (ref 39–117)
ALT SERPL W P-5'-P-CCNC: 12 U/L (ref 1–33)
ANION GAP SERPL CALCULATED.3IONS-SCNC: 11 MMOL/L (ref 5–15)
APTT PPP: 25.9 SECONDS (ref 22.7–35.4)
AST SERPL-CCNC: 13 U/L (ref 1–32)
BASOPHILS # BLD AUTO: 0.04 10*3/MM3 (ref 0–0.2)
BASOPHILS NFR BLD AUTO: 0.5 % (ref 0–1.5)
BILIRUB SERPL-MCNC: 0.3 MG/DL (ref 0–1.2)
BUN SERPL-MCNC: 14 MG/DL (ref 8–23)
BUN/CREAT SERPL: 19.4 (ref 7–25)
CALCIUM SPEC-SCNC: 9.5 MG/DL (ref 8.6–10.5)
CHLORIDE SERPL-SCNC: 103 MMOL/L (ref 98–107)
CO2 SERPL-SCNC: 27 MMOL/L (ref 22–29)
CREAT SERPL-MCNC: 0.72 MG/DL (ref 0.57–1)
DEPRECATED RDW RBC AUTO: 45.3 FL (ref 37–54)
EGFRCR SERPLBLD CKD-EPI 2021: 94.1 ML/MIN/1.73
EOSINOPHIL # BLD AUTO: 0.13 10*3/MM3 (ref 0–0.4)
EOSINOPHIL NFR BLD AUTO: 1.7 % (ref 0.3–6.2)
ERYTHROCYTE [DISTWIDTH] IN BLOOD BY AUTOMATED COUNT: 13.4 % (ref 12.3–15.4)
GLOBULIN UR ELPH-MCNC: 3.3 GM/DL
GLUCOSE SERPL-MCNC: 80 MG/DL (ref 65–99)
HCT VFR BLD AUTO: 42.8 % (ref 34–46.6)
HGB BLD-MCNC: 14.4 G/DL (ref 12–15.9)
IMM GRANULOCYTES # BLD AUTO: 0.02 10*3/MM3 (ref 0–0.05)
IMM GRANULOCYTES NFR BLD AUTO: 0.3 % (ref 0–0.5)
INR PPP: 1.1 (ref 0.9–1.1)
LYMPHOCYTES # BLD AUTO: 2.78 10*3/MM3 (ref 0.7–3.1)
LYMPHOCYTES NFR BLD AUTO: 36 % (ref 19.6–45.3)
MCH RBC QN AUTO: 31.1 PG (ref 26.6–33)
MCHC RBC AUTO-ENTMCNC: 33.6 G/DL (ref 31.5–35.7)
MCV RBC AUTO: 92.4 FL (ref 79–97)
MONOCYTES # BLD AUTO: 0.84 10*3/MM3 (ref 0.1–0.9)
MONOCYTES NFR BLD AUTO: 10.9 % (ref 5–12)
NEUTROPHILS NFR BLD AUTO: 3.92 10*3/MM3 (ref 1.7–7)
NEUTROPHILS NFR BLD AUTO: 50.6 % (ref 42.7–76)
NRBC BLD AUTO-RTO: 0 /100 WBC (ref 0–0.2)
PLATELET # BLD AUTO: 236 10*3/MM3 (ref 140–450)
PMV BLD AUTO: 10.2 FL (ref 6–12)
POTASSIUM SERPL-SCNC: 4.1 MMOL/L (ref 3.5–5.2)
PROT SERPL-MCNC: 7.2 G/DL (ref 6–8.5)
PROTHROMBIN TIME: 14.1 SECONDS (ref 11.7–14.2)
RBC # BLD AUTO: 4.63 10*6/MM3 (ref 3.77–5.28)
SODIUM SERPL-SCNC: 141 MMOL/L (ref 136–145)
WBC NRBC COR # BLD AUTO: 7.73 10*3/MM3 (ref 3.4–10.8)

## 2025-02-19 PROCEDURE — 96375 TX/PRO/DX INJ NEW DRUG ADDON: CPT

## 2025-02-19 PROCEDURE — 25010000002 DEXAMETHASONE PER 1 MG: Performed by: PHYSICIAN ASSISTANT

## 2025-02-19 PROCEDURE — G0378 HOSPITAL OBSERVATION PER HR: HCPCS

## 2025-02-19 PROCEDURE — 96374 THER/PROPH/DIAG INJ IV PUSH: CPT

## 2025-02-19 PROCEDURE — 72131 CT LUMBAR SPINE W/O DYE: CPT

## 2025-02-19 PROCEDURE — 25010000002 HYDROCORTISONE SOD SUC (PF) 250 MG RECONSTITUTED SOLUTION

## 2025-02-19 PROCEDURE — 25010000002 KETOROLAC TROMETHAMINE PER 15 MG: Performed by: NURSE PRACTITIONER

## 2025-02-19 PROCEDURE — 85730 THROMBOPLASTIN TIME PARTIAL: CPT | Performed by: NURSE PRACTITIONER

## 2025-02-19 PROCEDURE — 99285 EMERGENCY DEPT VISIT HI MDM: CPT

## 2025-02-19 PROCEDURE — 96376 TX/PRO/DX INJ SAME DRUG ADON: CPT

## 2025-02-19 PROCEDURE — 85610 PROTHROMBIN TIME: CPT | Performed by: NURSE PRACTITIONER

## 2025-02-19 PROCEDURE — 36415 COLL VENOUS BLD VENIPUNCTURE: CPT

## 2025-02-19 PROCEDURE — 85025 COMPLETE CBC W/AUTO DIFF WBC: CPT | Performed by: NURSE PRACTITIONER

## 2025-02-19 PROCEDURE — 80053 COMPREHEN METABOLIC PANEL: CPT | Performed by: NURSE PRACTITIONER

## 2025-02-19 RX ORDER — HYDROCODONE BITARTRATE AND ACETAMINOPHEN 7.5; 325 MG/1; MG/1
1 TABLET ORAL EVERY 6 HOURS PRN
Status: DISCONTINUED | OUTPATIENT
Start: 2025-02-19 | End: 2025-02-21 | Stop reason: HOSPADM

## 2025-02-19 RX ORDER — ROSUVASTATIN CALCIUM 20 MG/1
10 TABLET, COATED ORAL DAILY
Status: DISCONTINUED | OUTPATIENT
Start: 2025-02-19 | End: 2025-02-21 | Stop reason: HOSPADM

## 2025-02-19 RX ORDER — KETOROLAC TROMETHAMINE 15 MG/ML
15 INJECTION, SOLUTION INTRAMUSCULAR; INTRAVENOUS ONCE
Status: COMPLETED | OUTPATIENT
Start: 2025-02-19 | End: 2025-02-19

## 2025-02-19 RX ORDER — SODIUM CHLORIDE 0.9 % (FLUSH) 0.9 %
10 SYRINGE (ML) INJECTION AS NEEDED
Status: DISCONTINUED | OUTPATIENT
Start: 2025-02-19 | End: 2025-02-21 | Stop reason: HOSPADM

## 2025-02-19 RX ORDER — METHOCARBAMOL 750 MG/1
750 TABLET, FILM COATED ORAL ONCE
Status: COMPLETED | OUTPATIENT
Start: 2025-02-19 | End: 2025-02-19

## 2025-02-19 RX ORDER — SODIUM CHLORIDE 9 MG/ML
40 INJECTION, SOLUTION INTRAVENOUS AS NEEDED
Status: DISCONTINUED | OUTPATIENT
Start: 2025-02-19 | End: 2025-02-21 | Stop reason: HOSPADM

## 2025-02-19 RX ORDER — DEXAMETHASONE SODIUM PHOSPHATE 4 MG/ML
4 INJECTION, SOLUTION INTRA-ARTICULAR; INTRALESIONAL; INTRAMUSCULAR; INTRAVENOUS; SOFT TISSUE EVERY 8 HOURS
Status: DISCONTINUED | OUTPATIENT
Start: 2025-02-19 | End: 2025-02-19

## 2025-02-19 RX ORDER — FAMOTIDINE 20 MG/1
20 TABLET, FILM COATED ORAL
Status: DISCONTINUED | OUTPATIENT
Start: 2025-02-19 | End: 2025-02-21 | Stop reason: HOSPADM

## 2025-02-19 RX ORDER — POLYETHYLENE GLYCOL 3350 17 G/17G
17 POWDER, FOR SOLUTION ORAL DAILY PRN
Status: DISCONTINUED | OUTPATIENT
Start: 2025-02-19 | End: 2025-02-21 | Stop reason: HOSPADM

## 2025-02-19 RX ORDER — METHOCARBAMOL 750 MG/1
750 TABLET, FILM COATED ORAL EVERY 6 HOURS SCHEDULED
Status: DISCONTINUED | OUTPATIENT
Start: 2025-02-19 | End: 2025-02-21 | Stop reason: HOSPADM

## 2025-02-19 RX ORDER — AMOXICILLIN 250 MG
2 CAPSULE ORAL 2 TIMES DAILY PRN
Status: DISCONTINUED | OUTPATIENT
Start: 2025-02-19 | End: 2025-02-21 | Stop reason: HOSPADM

## 2025-02-19 RX ORDER — SODIUM CHLORIDE 0.9 % (FLUSH) 0.9 %
10 SYRINGE (ML) INJECTION EVERY 12 HOURS SCHEDULED
Status: DISCONTINUED | OUTPATIENT
Start: 2025-02-19 | End: 2025-02-21 | Stop reason: HOSPADM

## 2025-02-19 RX ORDER — LIDOCAINE 4 G/G
1 PATCH TOPICAL ONCE
Status: COMPLETED | OUTPATIENT
Start: 2025-02-19 | End: 2025-02-20

## 2025-02-19 RX ORDER — BISACODYL 5 MG/1
5 TABLET, DELAYED RELEASE ORAL DAILY PRN
Status: DISCONTINUED | OUTPATIENT
Start: 2025-02-19 | End: 2025-02-21 | Stop reason: HOSPADM

## 2025-02-19 RX ORDER — BISACODYL 10 MG
10 SUPPOSITORY, RECTAL RECTAL DAILY PRN
Status: DISCONTINUED | OUTPATIENT
Start: 2025-02-19 | End: 2025-02-21 | Stop reason: HOSPADM

## 2025-02-19 RX ORDER — LIDOCAINE 4 G/G
1 PATCH TOPICAL
Status: DISCONTINUED | OUTPATIENT
Start: 2025-02-20 | End: 2025-02-21 | Stop reason: HOSPADM

## 2025-02-19 RX ORDER — METHOCARBAMOL 500 MG/1
500 TABLET, FILM COATED ORAL EVERY 8 HOURS PRN
Status: DISCONTINUED | OUTPATIENT
Start: 2025-02-19 | End: 2025-02-19

## 2025-02-19 RX ADMIN — KETOROLAC TROMETHAMINE 15 MG: 15 INJECTION, SOLUTION INTRAMUSCULAR; INTRAVENOUS at 13:24

## 2025-02-19 RX ADMIN — HYDROCORTISONE SODIUM SUCCINATE 250 MG: 250 INJECTION, POWDER, FOR SOLUTION INTRAMUSCULAR; INTRAVENOUS at 17:28

## 2025-02-19 RX ADMIN — LIDOCAINE 1 PATCH: 4 PATCH TOPICAL at 13:27

## 2025-02-19 RX ADMIN — DEXAMETHASONE SODIUM PHOSPHATE 4 MG: 4 INJECTION, SOLUTION INTRAMUSCULAR; INTRAVENOUS at 14:48

## 2025-02-19 RX ADMIN — METHOCARBAMOL TABLETS 750 MG: 750 TABLET, COATED ORAL at 22:24

## 2025-02-19 RX ADMIN — Medication 10 ML: at 20:39

## 2025-02-19 RX ADMIN — METHOCARBAMOL TABLETS 750 MG: 750 TABLET, COATED ORAL at 17:28

## 2025-02-19 RX ADMIN — HYDROCORTISONE SODIUM SUCCINATE 250 MG: 250 INJECTION, POWDER, FOR SOLUTION INTRAMUSCULAR; INTRAVENOUS at 22:21

## 2025-02-19 RX ADMIN — METHOCARBAMOL TABLETS 750 MG: 750 TABLET, COATED ORAL at 13:14

## 2025-02-19 RX ADMIN — FAMOTIDINE 20 MG: 20 TABLET, FILM COATED ORAL at 17:27

## 2025-02-19 NOTE — ED NOTES
"Nursing report ED to floor  Wendy Dunn  64 y.o.  female    HPI :  HPI  Stated Reason for Visit: bilateral leg pain and numbness    Chief Complaint  Chief Complaint   Patient presents with    Leg Pain       Admitting doctor:   Zechariah Navarro MD    Admitting diagnosis:   The encounter diagnosis was Radiculopathy of lumbosacral region.    Code status:   Current Code Status       Date Active Code Status Order ID Comments User Context       2/19/2025 1436 CPR (Attempt to Resuscitate) [id number]  Dewey Haq III, PA ED        Question Answer    Code Status (Patient has no pulse and is not breathing) CPR (Attempt to Resuscitate)    Medical Interventions (Patient has pulse or is breathing) Full Support    Level Of Support Discussed With Patient                    Allergies:   Patient has no known allergies.    Isolation:   No active isolations    Intake and Output  No intake or output data in the 24 hours ending 02/19/25 1459    Weight:       02/19/25  1228   Weight: 81.2 kg (179 lb)       Most recent vitals:   Vitals:    02/19/25 1226 02/19/25 1228   BP:  144/92   Pulse: 97    Resp: 18    Temp: 98.3 °F (36.8 °C)    SpO2: 99%    Weight:  81.2 kg (179 lb)   Height:  160 cm (63\")       Active LDAs/IV Access:   Lines, Drains & Airways       Active LDAs       Name Placement date Placement time Site Days    Peripheral IV 02/19/25 1323 Right Antecubital 02/19/25  1323  Antecubital  less than 1                    Labs (abnormal labs have a star):   Labs Reviewed   CBC WITH AUTO DIFFERENTIAL - Normal   COMPREHENSIVE METABOLIC PANEL   PROTIME-INR   APTT   CBC AND DIFFERENTIAL    Narrative:     The following orders were created for panel order CBC & Differential.  Procedure                               Abnormality         Status                     ---------                               -----------         ------                     CBC Auto Differential[[id number]]        Normal              Final result        "          Please view results for these tests on the individual orders.       EKG:   No orders to display       Meds given in ED:   Medications   sodium chloride 0.9 % flush 10 mL (has no administration in time range)   Lidocaine 4 % 1 patch (1 patch Transdermal Medication Applied 2/19/25 1327)   sodium chloride 0.9 % flush 10 mL (has no administration in time range)   sodium chloride 0.9 % flush 10 mL (has no administration in time range)   sodium chloride 0.9 % infusion 40 mL (has no administration in time range)   sennosides-docusate (PERICOLACE) 8.6-50 MG per tablet 2 tablet (has no administration in time range)     And   polyethylene glycol (MIRALAX) packet 17 g (has no administration in time range)     And   bisacodyl (DULCOLAX) EC tablet 5 mg (has no administration in time range)     And   bisacodyl (DULCOLAX) suppository 10 mg (has no administration in time range)   dexAMETHasone (DECADRON) injection 4 mg (4 mg Intravenous Given 2/19/25 1448)   HYDROcodone-acetaminophen (NORCO) 7.5-325 MG per tablet 1 tablet (has no administration in time range)   methocarbamol (ROBAXIN) tablet 500 mg (has no administration in time range)   Lidocaine 4 % 1 patch (has no administration in time range)   ketorolac (TORADOL) injection 15 mg (15 mg Intravenous Given 2/19/25 1324)   methocarbamol (ROBAXIN) tablet 750 mg (750 mg Oral Given 2/19/25 1314)       Imaging results:  CT Lumbar Spine Without Contrast    Result Date: 2/19/2025  1.  Transitional anatomy is appreciated with what is being considered to be sacralization of L5. A pseudoarthrosis at L5 is noted bilaterally. 2.  Multilevel degenerative disease involving the lumbar spine is noted as described above including loss of disc height and a grade 1 retrolisthesis at L4-L5. Multilevel facet degenerative disease and foraminal stenosis is appreciated as described above. There is no evidence of a focal herniation. See above. Further evaluation could be performed with a MRI  examination of the lumbar spine as indicated.  Radiation dose reduction techniques were utilized, including automated exposure control and exposure modulation based on body size.   This report was finalized on 2/19/2025 2:12 PM by Dr. Rinku Mcclendon M.D on Workstation: [id number]       Ambulatory status:   Assist x1     Social issues:   Social History     Socioeconomic History    Marital status:    Tobacco Use    Smoking status: Every Day     Current packs/day: 0.50     Average packs/day: 0.5 packs/day for 1.1 years (0.6 ttl pk-yrs)     Types: Cigarettes     Start date: 2024     Last attempt to quit: 2015    Smokeless tobacco: Never   Vaping Use    Vaping status: Never Used   Substance and Sexual Activity    Alcohol use: No    Drug use: No    Sexual activity: Never       Peripheral Neurovascular  Peripheral Neurovascular (Adult)  Peripheral Neurovascular WDL: WDL    Neuro Cognitive  Neuro Cognitive (Adult)  Cognitive/Neuro/Behavioral WDL: WDL    Learning       Respiratory  Respiratory WDL  Respiratory WDL: WDL    Abdominal Pain       Pain Assessments  Pain (Adult)  (0-10) Pain Rating: Rest: 5  (0-10) Pain Rating: Activity: 7  Pain Location: other (see comments) (legs)  Pain Side/Orientation: bilateral  Response to Pain Interventions: functional ability unchanged    NIH Stroke Scale       Charlene Alcantara RN  02/19/25 14:59 EST

## 2025-02-19 NOTE — ED PROVIDER NOTES
EMERGENCY DEPARTMENT ENCOUNTER    Room Number:  42/42  Date seen:  2/19/2025  PCP: Wojciech Garcia MD  Historian/Independent historian: daughter  Chronic or social conditions impacting care: n/a      HPI:  Chief Complaint: right leg pain  A complete HPI/ROS/PMH/PSH/SH/FH are unobtainable due to: n/a  Context: Wendy Dunn is a 64 y.o. female who presents to the ED c/o acute right leg pain.  She states that over the last week she has been having worsening pain in her right buttock that shoots down to her right foot.  She states that she does have numbness and tingling in the right leg.  No leg weakness.  No loss of bowel or bladder control, saddle anesthesia.  She has had no previous spine surgeries.  No injuries or trauma.  She is not on any anticoagulation.    External Medical record review:   6/17/24: hospital admit  Plan:  Right-sided lumbar radiculopathy  -MRI lumbar spine shows degenerative changes in the lumbar spine  -Neurosurgery consulted  -[facility] on 6/17  -Discharge Medrol Dosepak/Pepcid  -Continue to use baclofen as needed  -Referral sent for outpatient physical therapy     Hyperlipidemia  -Continue rosuvastatin     Disposition: Home  IMPRESSION:  1. There is transitional anatomy at the lumbosacral junction and for the  purposes of this report, the transitional vertebra is labeled as a  partially sacralized L5 vertebra with the last-inferiormost partially  hydrated hypoplastic disc space labeled as the L5-S1 disc space and  above the partially sacralized L5 vertebra are 4 non-rib-bearing  vertebrae labeled as L1 through L4 and the lowermost rib-bearing  vertebra is labeled as T12 and this is the same numbering system that  was used on the prior lumbar spine CT 11 days ago on 06/04/2024.     2. There is very mild lumbar spondylosis as described. While there is  mild bilateral facet overgrowth at L2-3 and mild-to-moderate bilateral  facet overgrowth at L3-4 and minimal right and mild left  foraminal  narrowing at L2-3, I see no additional canal or foraminal narrowing from  T12 down to L4.     3. At L4-5, there is mild left and mild-to-moderate right facet  overgrowth, disc space narrowing, degenerative endplate change and a 3  mm retrolisthesis of L4 with respect to L5 and a mild diffuse posterior  disc osteophyte complex but there is no central canal narrowing. There  is some mild bilateral lateral recess narrowing that could potentially  affect the traversing L5 nerve roots.     4. The L5-S1 level is a hypoplastic disc space and there is no canal or  lateral recess narrowing. There is no proximal foraminal narrowing but  there is some spurring into the anterior aspect of the neural foramina  bilaterally, right much more prominent than left. There is only minimal  left and there is up to moderate bony foraminal narrowing at the  anterior aspect the right foramen that could potentially affect the  exiting right L5 nerve root. This finding is better demonstrated on  prior lumbar spine CT 06/04/2024.     This report was finalized on 6/15/2024 7:53 PM by Dr. Abiodun Torres M.D  on Workstation: [id number]    PAST MEDICAL HISTORY  Active Ambulatory Problems     Diagnosis Date Noted    Osteoporosis 11/18/2015    Pica in adults 11/07/2017    Spinal stenosis in cervical region 12/08/2017    Degeneration of cervical intervertebral disc 12/08/2017    Colon cancer screening 12/27/2017    Pain of upper abdomen 12/01/2020    Gastroesophageal reflux disease 12/01/2020    Nausea and vomiting 12/01/2020    Weight loss 12/01/2020    Fatty liver 05/15/2024    Acute right lumbar radiculopathy 06/15/2024    Complex tear of medial meniscus of right knee as current injury 08/19/2024    Tendinitis of left shoulder 01/23/2025     Resolved Ambulatory Problems     Diagnosis Date Noted    No Resolved Ambulatory Problems     Past Medical History:   Diagnosis Date    GERD (gastroesophageal reflux disease)     Hyperlipidemia      Low back pain          PAST SURGICAL HISTORY  Past Surgical History:   Procedure Laterality Date    ANTERIOR CERVICAL DISCECTOMY W/ FUSION N/A 1/12/2018    Procedure: C4 to C6 anterior cervical discectomy, fusion, and instrumentation;  Surgeon: Padilla Saleh MD;  Location: [facility] MAIN OR;  Service:     COLONOSCOPY N/A 3/5/2018    Procedure: COLONOSCOPY TO CECUM AND TI; COLD POLYPECTOMY;  Surgeon: Benita Barnes MD;  Location: [facility] ENDOSCOPY;  Service:     KNEE ARTHROSCOPY Right 8/27/2024    Procedure: KNEE ARTHROSCOPY with partial medial and lateral menisectomy and debridement of arthritis;  Surgeon: Carmella Whitmore MD;  Location:  [facility];  Service: Orthopedics;  Laterality: Right;    MAMMO BILATERAL  04/2015    normal    THYROIDECTOMY, PARTIAL      TOTAL ABDOMINAL HYSTERECTOMY  2003         FAMILY HISTORY  Family History   Problem Relation Age of Onset    Stroke Mother     Hypertension Mother     Stroke Father     Stroke Sister     Malig Hyperthermia Neg Hx          SOCIAL HISTORY  Social History     Socioeconomic History    Marital status:    Tobacco Use    Smoking status: Every Day     Current packs/day: 0.50     Average packs/day: 0.5 packs/day for 1.1 years (0.6 ttl pk-yrs)     Types: Cigarettes     Start date: 2024     Last attempt to quit: 2015    Smokeless tobacco: Never   Vaping Use    Vaping status: Never Used   Substance and Sexual Activity    Alcohol use: No    Drug use: No    Sexual activity: Never         ALLERGIES  Patient has no known allergies.        REVIEW OF SYSTEMS  Per HPI, otherwise negative.       PHYSICAL EXAM  ED Triage Vitals   Temp Heart Rate Resp BP SpO2   02/19/25 1226 02/19/25 1226 02/19/25 1226 02/19/25 1228 02/19/25 1226   98.3 °F (36.8 °C) 97 18 144/92 99 %      Temp src Heart Rate Source Patient Position BP Location FiO2 (%)   -- -- -- -- --              Physical Exam  Vitals and nursing note reviewed.   Constitutional:       Appearance: Normal appearance.    HENT:      Head: Normocephalic and atraumatic.      Mouth/Throat:      Mouth: Mucous membranes are moist.   Eyes:      Conjunctiva/sclera: Conjunctivae normal.   Cardiovascular:      Rate and Rhythm: Normal rate and regular rhythm.      Pulses: Normal pulses.      Heart sounds: Normal heart sounds.   Pulmonary:      Effort: Pulmonary effort is normal.      Breath sounds: Normal breath sounds. No wheezing.   Abdominal:      General: Bowel sounds are normal.      Palpations: Abdomen is soft.      Tenderness: There is no abdominal tenderness. There is no guarding.   Musculoskeletal:      Comments: No midline cervical spine tenderness. No obvious step-offs or deformities. Strength 5/5 equal to BUE. Sensation intact to light touch. FROM.    No midline thoracic spine tenderness. No obvious step-off or deformities.     No lumbar spine tenderness. Positive, right-sided straight-leg exam. Strength 5/5 and equal to BLE. Sensation intact to light touch. FROM.     Skin:     General: Skin is warm.      Capillary Refill: Capillary refill takes less than 2 seconds.   Neurological:      Mental Status: She is alert and oriented to person, place, and time. Mental status is at baseline.   Psychiatric:         Mood and Affect: Mood normal.               LAB RESULTS  No results found for this or any previous visit (from the past 24 hours).    Ordered the above labs and reviewed the results.        RADIOLOGY  CT Lumbar Spine Without Contrast    Result Date: 2/19/2025  CT LUMBAR SPINE WITHOUT CONTRAST  HISTORY: Back pain, right radiculopathy.  COMPARISON: MRI lumbar spine 6/15/2024  FINDINGS: There is a grade 1 retrolisthesis of L4 upon L5. There is moderate loss of disc height and vacuum disc effect at L4-L5.  L1-L2: There is no evidence of a disc bulge or herniation.  L2-L3: Mild facet degenerative disease is present bilaterally. Mild foraminal stenosis is present on the right and there is moderate foraminal stenosis on the left  secondary to facet hypertrophy.  L3-L4: Mild to moderate facet degenerative disease is present bilaterally. A minimal central disc bulge is present.  L4-L5: Mild facet degenerative disease is present bilaterally. There is mild canal stenosis secondary to a mild disc bulge and the retrolisthesis of L4 upon L5. Mild to moderate foraminal stenosis is present bilaterally secondary to retrolisthesis of L4 upon L5 and extension of a small disc osteophyte complex into the neural foramen.  L5-S1: Transitional anatomy is appreciated with what is being considered to be sacralization of L5. Mild facet degenerative disease is present bilaterally. There is severe arthrosis is noted bilaterally.      1.  Transitional anatomy is appreciated with what is being considered to be sacralization of L5. A pseudoarthrosis at L5 is noted bilaterally. 2.  Multilevel degenerative disease involving the lumbar spine is noted as described above including loss of disc height and a grade 1 retrolisthesis at L4-L5. Multilevel facet degenerative disease and foraminal stenosis is appreciated as described above. There is no evidence of a focal herniation. See above. Further evaluation could be performed with a MRI examination of the lumbar spine as indicated.  Radiation dose reduction techniques were utilized, including automated exposure control and exposure modulation based on body size.   This report was finalized on 2/19/2025 2:12 PM by Dr. Rinku Mcclendon M.D on Workstation: [id number]       Ordered the above noted radiological studies. Reviewed by me in PACS.        MEDICATIONS GIVEN IN ER  Medications   sodium chloride 0.9 % flush 10 mL (has no administration in time range)   Lidocaine 4 % 1 patch (1 patch Transdermal Medication Applied 2/19/25 1327)   ketorolac (TORADOL) injection 15 mg (15 mg Intravenous Given 2/19/25 1324)   methocarbamol (ROBAXIN) tablet 750 mg (750 mg Oral Given 2/19/25 1314)           MEDICAL DECISION MAKING, PROGRESS,  and CONSULTS    All labs have been independently reviewed by me.  All radiology studies have been reviewed by me and I have also reviewed the radiology report.   EKG's independently viewed and interpreted by me.  Discussion below represents my analysis of pertinent findings related to patient's condition, differential diagnosis, treatment plan and final disposition.    64-year-old female who presents with right radiculopathy and no known injury.  Diffuse degenerative changes noted on CT scan.  Pain persist after medication, plan to admit to [facility] unit for MRI and neurosurgery consult.          Shared decision making/consideration for admission:  admit      Orders placed during this visit:  Orders Placed This Encounter   Procedures    CT Lumbar Spine Without Contrast    Comprehensive Metabolic Panel    Protime-INR    aPTT    CBC Auto Differential    Insert Peripheral IV    Initiate Emergency Department Observation Status    CBC & Differential         Differential diagnosis include, but not limited to: Fracture, sciatica, muscle spasm      Additional orders considered but not ordered: MRI lumbar spine    Independent interpretation of labs, radiology studies, and discussions with consultants:    Discussed with Dr. Montez, who agrees with plan.     ED Course as of 02/19/25 1431   Wed Feb 19, 2025   1420 Updated patient and daughter on imaging, plan to admit for MRI and [facility] consult.    I updated the patient on current ED work up, including labs and imaging (if performed) with indication for admission. All questions and concerns addressed and ready for admit at this time.    [JG]   1430 Discussed with LISSETT Baugh with [facility]. He agrees to admit. No further recommendations. Labs pending.  [JG]      ED Course User Index  [JG] Hayley Vernon APRN             DIAGNOSIS  Final diagnoses:   Radiculopathy of lumbosacral region         DISPOSITION  admit        Latest Documented Vital Signs:  As of 14:31 EST  BP- 144/92 HR- 97  Temp- 98.3 °F (36.8 °C) O2 sat- 99%              --    Please note that portions of this were completed with a voice recognition program.       Note Disclaimer: At [facility], we believe that sharing information builds trust and better relationships. You are receiving this note because you are receiving care at [facility] or recently visited. It is possible you will see health information before a provider has talked with you about it. This kind of information can be easy to misunderstand. To help you fully understand what it means for your health, we urge you to discuss this note with your provider.             Hayley Vernon, NATASHA  02/19/25 1430

## 2025-02-19 NOTE — TELEPHONE ENCOUNTER
Caller: Wendy Dunn    Relationship: Self    Best call back number: [phone]     What medication are you requesting: PAIN     What are your current symptoms: LEG PAIN     How long have you been experiencing symptoms: 4 DAYS     Have you had these symptoms before:    [x] Yes  [] No    Have you been treated for these symptoms before:   [x] Yes  [] No    If a prescription is needed, what is your preferred pharmacy and phone number: [facility] [phone] - [address]  [facility] - [phone] [facility] [phone] FX     Additional notes:

## 2025-02-19 NOTE — TELEPHONE ENCOUNTER
Pt transferred from [facility]. States that she has been experiencing leg pain for about a week and it worsens at night when she lays down. Told Rep from [facility] that she was having pain in her calves and they are warm to the touch. Told her that Dr. Garcia does not have any avail appts. Advised patient that she should go to ER today to have this checked out. Pt verbalized understanding and says that she will go

## 2025-02-19 NOTE — CONSULTS
[facility] NEUROSURGERY CONSULT NOTE    Patient name: Wendy Dunn  Referring Provider: Amauri Haq  Reason for Consultation: intractable LBP w/ sciatica    Patient Care Team:  Wojciech Garcia MD as PCP - General (Family Medicine)  Benita Barnes MD as Consulting Physician (Gastroenterology)  Padilla Saleh MD as Surgeon (Neurosurgery)    Chief complaint: low back and RLE pain    Subjective .     History of present illness:    Patient is a 64 y.o.  female with HLD, osteoporosis, known cervical stenosis, GERD, and s/p surgery right knee 8/2024 for meniscal tear. She presents with 1 week of progressive low back and RLE pain without any precipitating event or injury. Pain radiates from her back to her right hip and buttock down her posterior RLE to her foot. She does endorse numbness and tingling in her RLE. Pain is constant but better when still and worse with weight bearing. She has started to notice some recent L knee pain but believes this is secondary to compensation. She has had back and RLE pain in the past but described as muscle spasms. She underwent LESI in the past but it did not improve her pain. Believe this around the same time she underwent R knee surgery.  Denies any weakness, saddle paresthesias, or b/b incontinence.    Review of Systems  Review of Systems   Musculoskeletal:  Positive for back pain and gait problem.   Neurological:  Positive for numbness. Negative for weakness.       History  PAST MEDICAL HISTORY  Past Medical History:   Diagnosis Date    GERD (gastroesophageal reflux disease)     Hyperlipidemia     Low back pain     Nausea and vomiting 12/01/2020    Osteoporosis        PAST SURGICAL HISTORY  Past Surgical History:   Procedure Laterality Date    ANTERIOR CERVICAL DISCECTOMY W/ FUSION N/A 1/12/2018    Procedure: C4 to C6 anterior cervical discectomy, fusion, and instrumentation;  Surgeon: Padilla Saleh MD;  Location: [facility] OR;  Service:     COLONOSCOPY N/A 3/5/2018     Procedure: COLONOSCOPY TO CECUM AND TI; COLD POLYPECTOMY;  Surgeon: Benita Barnes MD;  Location: [facility] ENDOSCOPY;  Service:     KNEE ARTHROSCOPY Right 8/27/2024    Procedure: KNEE ARTHROSCOPY with partial medial and lateral menisectomy and debridement of arthritis;  Surgeon: Carmella Whitmore MD;  Location:  [facility];  Service: Orthopedics;  Laterality: Right;    MAMMO BILATERAL  04/2015    normal    THYROIDECTOMY, PARTIAL      TOTAL ABDOMINAL HYSTERECTOMY  2003       FAMILY HISTORY  Family History   Problem Relation Age of Onset    Stroke Mother     Hypertension Mother     Stroke Father     Stroke Sister     Malig Hyperthermia Neg Hx        SOCIAL HISTORY  Social History     Tobacco Use    Smoking status: Every Day     Current packs/day: 0.50     Average packs/day: 0.5 packs/day for 1.1 years (0.6 ttl pk-yrs)     Types: Cigarettes     Start date: 2024     Last attempt to quit: 2015    Smokeless tobacco: Never   Vaping Use    Vaping status: Never Used   Substance Use Topics    Alcohol use: No    Drug use: No         Allergies:  Patient has no known allergies.    MEDICATIONS:  Medications Prior to Admission   Medication Sig Dispense Refill Last Dose/Taking    acetaminophen-codeine (TYLENOL with CODEINE #3) 300-30 MG per tablet Take 1 tablet by mouth Every 6 (Six) Hours As Needed for Moderate Pain or Severe Pain. (Patient not taking: Reported on 12/26/2024) 20 tablet 0 More than a month    Calcium Carbonate-Vit D-Min (CALCIUM 1200 PO) Take 1 tablet by mouth Daily. HOLD PRIOR TO SURGERY   2/18/2025    cyclobenzaprine (FLEXERIL) 10 MG tablet Take 1 tablet by mouth 2 (Two) Times a Day As Needed for Muscle Spasms. 60 tablet 2 Past Week    HYDROcodone-acetaminophen (NORCO) 5-325 MG per tablet Take 1 tablet by mouth Every 4 (Four) Hours As Needed for Severe Pain. (Patient not taking: Reported on 12/26/2024) 28 tablet 0 More than a month    Omega-3 Fatty Acids (fish oil) 1000 MG capsule capsule Take 1 capsule by mouth  Daily With Breakfast. HOLD PRIOR TO SURGERY   2/18/2025    rosuvastatin (Crestor) 10 MG tablet Take 1 tablet by mouth Daily. For cholesterol and labs due 3 mos 30 tablet 11 Past Week    vitamin B-12 (CYANOCOBALAMIN) 500 MCG tablet Take 1 tablet by mouth Daily. HOLD PRIOR TO SURGERY   2/18/2025    vitamin E 400 UNIT capsule Take 1 capsule by mouth Every Morning. HOLD PRIOR TO SURGERY   2/18/2025         Current Facility-Administered Medications:     sennosides-docusate (PERICOLACE) 8.6-50 MG per tablet 2 tablet, 2 tablet, Oral, BID PRN **AND** polyethylene glycol (MIRALAX) packet 17 g, 17 g, Oral, Daily PRN **AND** bisacodyl (DULCOLAX) EC tablet 5 mg, 5 mg, Oral, Daily PRN **AND** bisacodyl (DULCOLAX) suppository 10 mg, 10 mg, Rectal, Daily PRN, Dewey Haq III, PA    dexAMETHasone (DECADRON) injection 4 mg, 4 mg, Intravenous, Q8H, Dewey Haq III, PA, 4 mg at 02/19/25 1448    HYDROcodone-acetaminophen (NORCO) 7.5-325 MG per tablet 1 tablet, 1 tablet, Oral, Q6H PRN, Dewey Haq III, PA    Lidocaine 4 % 1 patch, 1 patch, Transdermal, Once, EdwardotelHayley uriostegui APRN, 1 patch at 02/19/25 1327    [START ON 2/20/2025] Lidocaine 4 % 1 patch, 1 patch, Transdermal, Q24H, Dewey Haq III, PA    methocarbamol (ROBAXIN) tablet 500 mg, 500 mg, Oral, Q8H PRN, Dewey Haq III, PA    [COMPLETED] Insert Peripheral IV, , , Once **AND** sodium chloride 0.9 % flush 10 mL, 10 mL, Intravenous, PRN, GettelHayley uriostegui, APRN    sodium chloride 0.9 % flush 10 mL, 10 mL, Intravenous, Q12H, Dewey Haq III, PA    sodium chloride 0.9 % flush 10 mL, 10 mL, Intravenous, PRN, Dewey Haq III, PA    sodium chloride 0.9 % infusion 40 mL, 40 mL, Intravenous, PRN, Dewey Haq III, PA    COMORBID CONDITIONS:  Osteoporosis and HLD, GERD    Objective     Results Review:  LABS:  Results from last 7 days   Lab Units 02/19/25  1438   WBC 10*3/mm3 7.73    HEMOGLOBIN g/dL 14.4   HEMATOCRIT % 42.8   PLATELETS 10*3/mm3 236     Results from last 7 days   Lab Units 02/19/25  1438   SODIUM mmol/L 141   POTASSIUM mmol/L 4.1   CHLORIDE mmol/L 103   CO2 mmol/L 27.0   BUN mg/dL 14   CREATININE mg/dL 0.72   CALCIUM mg/dL 9.5   BILIRUBIN mg/dL 0.3   ALK PHOS U/L 64   ALT (SGPT) U/L 12   AST (SGOT) U/L 13   GLUCOSE mg/dL 80         DIAGNOSTICS:  CT Lumbar spine: There is mild canal stenosis secondary to a mild disc bulge and the  retrolisthesis of L4 upon L5. Mild to moderate foraminal stenosis is  present bilaterally secondary to retrolisthesis of L4 upon L5 and  extension of a small disc osteophyte complex into the neural foramen. Sacralization of L5, severe arthrosis bilaterally       Results Review:   I reviewed the patient's new clinical results.  I personally viewed and interpreted the patient's chart, labs, medications, and imaging.     Vital Signs   Temp:  [97.3 °F (36.3 °C)-98.3 °F (36.8 °C)] 97.3 °F (36.3 °C)  Heart Rate:  [70-97] 70  Resp:  [18] 18  BP: (144-156)/(92-95) 156/95    Physical Exam:  Physical Exam  Vitals reviewed.   Constitutional:       Appearance: Normal appearance.   Pulmonary:      Effort: Pulmonary effort is normal.   Musculoskeletal:      Thoracic back: No tenderness or bony tenderness.      Lumbar back: No tenderness or bony tenderness.      Right hip: No tenderness or bony tenderness.   Skin:     General: Skin is warm and dry.   Neurological:      Mental Status: She is alert.      Motor: Motor strength is normal.     Deep Tendon Reflexes:      Reflex Scores:       Patellar reflexes are 2+ on the right side and 2+ on the left side.       Achilles reflexes are 2+ on the right side and 2+ on the left side.  Psychiatric:         Speech: Speech normal.       Neurological Exam  Mental Status  Alert. Oriented to person, place, time and situation. Recent and remote memory are intact. Speech is normal. Language is fluent with no aphasia. Attention and  "concentration are normal.    Motor  Normal muscle bulk throughout. Normal muscle tone. Strength is 5/5 throughout all four extremities.    Sensory  Sensation is intact to light touch, pinprick, vibration and proprioception in all four extremities.    Reflexes                                            Right                      Left  Patellar                                2+                         2+  Achilles                                2+                         2+    Gait    Not observed.      Assessment & Plan       Sciatica      Problem List Items Addressed This Visit    None  Visit Diagnoses       Radiculopathy of lumbosacral region    -  Primary           Pleasant 64 female with progressive low back and right lower extremity pain x 1 week.  Pain runs down her posterior leg to her feet and is worse with weightbearing.  She endorses numbness and tingling but intact on exam with full sensation. Ct shows mild to moderate neuroforaminal stenosis bilaterally at L4/5 as well as some retrolisthesis at this level. Will start with some steroids and keep NPO for LESI tomorrow. Will consider MRI if no improvement in symptoms with steroids/LESI    PLAN:     Solu-cortef, pepcid, and muscle relaxers  NPO at midnight  Hopefully LESI tomorrow  Activity as tolerated    I discussed the patient's findings and my recommendations with patient, family, nursing staff, and Dr. Saleh    During patient visit, I utilized appropriate personal protective equipment including gloves and mask.  Mask used was standard procedure mask. Appropriate PPE was worn during the entire visit.  Hand hygiene was completed before and after.     Ryann Wren, APRN  02/19/25  15:54 EST    \"Dictated utilizing Dragon dictation\".     "

## 2025-02-19 NOTE — H&P
[facility]   HISTORY AND PHYSICAL    Patient Name: Wendy Dunn  : 1960  MRN: [id number]  Primary Care Physician:  Wojciech Garcia MD  Date of admission: 2025    Subjective   Subjective     Chief Complaint:   Chief Complaint   Patient presents with    Leg Pain         HPI:    Wendy Dunn is a 64 y.o. female who was admitted to the observation unit for further evaluation of acute lower back pain with right lower extremity radiculopathy.  Patient reports over the last week she is having worse pain in the buttocks that shoots down to her right foot.  There is some numbness and tingling in the right leg.  She denies lower extremity weakness.  Her pain is exacerbated with ambulation.  No loss of bowel or bladder control.  No saddle anesthesia.  No fever or chills.  No remote history of CVA.  No past medical history of back surgery or back injuries.  She denies trauma.    In the ED patient's chemistry, CBC were normal.  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  MRI recommended if clinically indicated.    Review of Systems   All systems were reviewed and negative except for: That listed above    Personal History     Past Medical History:   Diagnosis Date    GERD (gastroesophageal reflux disease)     Hyperlipidemia     Low back pain     Nausea and vomiting 2020    Osteoporosis        Past Surgical History:   Procedure Laterality Date    ANTERIOR CERVICAL DISCECTOMY W/ FUSION N/A 2018    Procedure: C4 to C6 anterior cervical discectomy, fusion, and instrumentation;  Surgeon: Padilla Saleh MD;  Location: [facility] OR;  Service:     COLONOSCOPY N/A 3/5/2018    Procedure: COLONOSCOPY TO CECUM AND TI; COLD POLYPECTOMY;  Surgeon: Benita Barnes MD;  Location: [facility] ENDOSCOPY;  Service:     KNEE ARTHROSCOPY Right 2024    Procedure: KNEE ARTHROSCOPY with partial medial and lateral menisectomy and debridement of  arthritis;  Surgeon: Carmella Whitmore MD;  Location: [facility] OR [facility];  Service: Orthopedics;  Laterality: Right;    MAMMO BILATERAL  04/2015    normal    THYROIDECTOMY, PARTIAL      TOTAL ABDOMINAL HYSTERECTOMY  2003       Family History: family history includes Hypertension in her mother; Stroke in her father, mother, and sister. Otherwise pertinent FHx was reviewed and not pertinent to current issue.    Social History:  reports that she has been smoking cigarettes. She started smoking about 13 months ago. She has a 0.6 pack-year smoking history. She has never used smokeless tobacco. She reports that she does not drink alcohol and does not use drugs.    Home Medications:  Calcium Carbonate-Vit D-Min, HYDROcodone-acetaminophen, acetaminophen-codeine, cyclobenzaprine, fish oil, rosuvastatin, vitamin B-12, and vitamin E    Allergies:  No Known Allergies    Objective   Objective     Vitals:   Temp:  [97.3 °F (36.3 °C)-98.3 °F (36.8 °C)] 97.3 °F (36.3 °C)  Heart Rate:  [70-97] 70  Resp:  [18] 18  BP: (144-156)/(92-95) 156/95  Physical Exam    Constitutional: Awake, alert   Eyes: PERRLA, sclerae anicteric, no conjunctival injection   HENT: NCAT, mucous membranes moist   Neck: Supple, no thyromegaly, no lymphadenopathy, trachea midline   Respiratory: Clear to auscultation bilaterally, nonlabored respirations    Cardiovascular: RRR, no murmurs, rubs, or gallops, palpable pedal pulses bilaterally   Gastrointestinal: Positive bowel sounds, soft, nontender, nondistended   Musculoskeletal: No bilateral ankle edema, no clubbing or cyanosis to extremities   Psychiatric: Appropriate affect, cooperative   Neurologic: Oriented x 3, strength symmetric in all extremities, Cranial Nerves grossly intact to confrontation, speech clear   Skin: No rashes     Result Review    Result Review:  I have personally reviewed the results from the time of this admission to 2/19/2025 17:15 EST and agree with these findings:  [x]  Laboratory list /  accordion  []  Microbiology  [x]  Radiology  []  EKG/Telemetry   []  Cardiology/Vascular   []  Pathology  []  Old records  []  Other:        Assessment & Plan   The 10-year ASCVD risk score (Renee SALINAS, et al., 2019) is: 18.2%    Values used to calculate the score:      Age: 64 years      Sex: Female      Is Non- : Yes      Diabetic: No      Tobacco smoker: Yes      Systolic Blood Pressure: 156 mmHg      Is BP treated: No      HDL Cholesterol: 83 mg/dL      Total Cholesterol: 202 mg/dL    Assessment / Plan     Brief Patient Summary:  Wendy Dunn is a 64 y.o. female who was admitted to the observation unit for further evaluation of lower back pain with right-sided sciatica.  Patient denies lower extremity weakness.  Pain was reportedly getting worse over the past few days.  CT of the lumbar spine in the ED showed DDD/DJD of the lumbar spine and MRI was recommended if clinically indicated.    Active Hospital Problems:  Active Hospital Problems    Diagnosis     **Sciatica      Plan:     Acute lower back pain  Right-sided sciatica  -Decadron 4 mg every 6  -Norco 7.5/325 every 6  -Robaxin 500 mg 3 times daily  -Lidoderm patch  -Neurosurgery consultation pending  -MRI lumbar spine without contrast    Dyslipidemia  -Continue with home Crestor          VTE Prophylaxis:  Mechanical VTE prophylaxis orders are present.        CODE STATUS:    Level Of Support Discussed With: Patient  Code Status (Patient has no pulse and is not breathing): CPR (Attempt to Resuscitate)  Medical Interventions (Patient has pulse or is breathing): Full Support    Admission Status:  I believe this patient meets observation status.    Electronically signed by Dewey Haq III, PA, 02/19/25, 5:09 PM EST.        75 minutes has been spent by [facility] providers in the care of this patient while under observation status      I have worn appropriate PPE during this patient encounter,  sanitized my hands both with entering and exiting patient's room.    I have discussed plan of care with patient including advance care plan and/or surrogate decision maker.  Patient advises that their mother/Amarilys Conley will be their primary surrogate decision maker

## 2025-02-19 NOTE — ED PROVIDER NOTES
EMERGENCY DEPARTMENT MD ATTESTATION NOTE    Room Number:  42/42  PCP: Wojciech Garcia MD  Independent Historians: Patient    HPI:    Context: Wendy Dunn is a 64 y.o. female who presents to the ED c/o acute back and leg pain.  Pain is located in the lumbar region and radiates down the right leg.  Patient denies red flag symptoms.    PHYSICAL EXAM    I have reviewed the triage vital signs and nursing notes.    ED Triage Vitals   Temp Heart Rate Resp BP SpO2   02/19/25 1226 02/19/25 1226 02/19/25 1226 02/19/25 1228 02/19/25 1226   98.3 °F (36.8 °C) 97 18 144/92 99 %      Temp src Heart Rate Source Patient Position BP Location FiO2 (%)   -- -- -- -- --              Physical Exam  GENERAL: alert, no acute distress  SKIN: Warm, dry  HENT: Normocephalic, atraumatic  EYES: no scleral icterus  CV: regular rhythm, regular rate  RESPIRATORY: normal effort, lungs clear  ABDOMEN: soft, nontender, nondistended  MUSCULOSKELETAL: no deformity.  Positive straight leg test  NEURO: alert, moves all extremities, follows commands            MEDICATIONS GIVEN IN ER  Medications   sodium chloride 0.9 % flush 10 mL (has no administration in time range)   Lidocaine 4 % 1 patch (1 patch Transdermal Medication Applied 2/19/25 1327)   sodium chloride 0.9 % flush 10 mL (has no administration in time range)   sodium chloride 0.9 % flush 10 mL (has no administration in time range)   sodium chloride 0.9 % infusion 40 mL (has no administration in time range)   sennosides-docusate (PERICOLACE) 8.6-50 MG per tablet 2 tablet (has no administration in time range)     And   polyethylene glycol (MIRALAX) packet 17 g (has no administration in time range)     And   bisacodyl (DULCOLAX) EC tablet 5 mg (has no administration in time range)     And   bisacodyl (DULCOLAX) suppository 10 mg (has no administration in time range)   dexAMETHasone (DECADRON) injection 4 mg (has no administration in time range)   HYDROcodone-acetaminophen (NORCO) 7.5-325 MG  per tablet 1 tablet (has no administration in time range)   methocarbamol (ROBAXIN) tablet 500 mg (has no administration in time range)   Lidocaine 4 % 1 patch (has no administration in time range)   ketorolac (TORADOL) injection 15 mg (15 mg Intravenous Given 2/19/25 1324)   methocarbamol (ROBAXIN) tablet 750 mg (750 mg Oral Given 2/19/25 1314)         ORDERS PLACED DURING THIS VISIT:  Orders Placed This Encounter   Procedures    CT Lumbar Spine Without Contrast    MRI Lumbar Spine Without Contrast    Comprehensive Metabolic Panel    Protime-INR    aPTT    CBC Auto Differential    CBC (No Diff)    Basic Metabolic Panel    Diet: Regular/House; Fluid Consistency: Thin (IDDSI 0)    NPO Diet NPO Type: Strict NPO    Up With Assistance    Intake & Output    Weigh Patient    Oral Care    Saline Lock & Maintain IV Access    Place Sequential Compression Device    Maintain Sequential Compression Device    Vital Signs    Code Status and Medical Interventions: CPR (Attempt to Resuscitate); Full Support    Inpatient Neurosurgery Consult    PT Consult: Eval & Treat Functional Mobility Below Baseline    Insert Peripheral IV    Insert Peripheral IV    Initiate Emergency Department Observation Status    CBC & Differential         PROCEDURES  Procedures            PROGRESS, DATA ANALYSIS, CONSULTS, AND MEDICAL DECISION MAKING  All labs have been independently interpreted by me.  All radiology studies have been reviewed by me. All EKG's have been independently viewed and interpreted by me.  Discussion below represents my analysis of pertinent findings related to patient's condition, differential diagnosis, treatment plan and final disposition.    Differential diagnosis includes but is not limited to muscle strain, muscle spasm, sciatica, DDD, disc bulge.    Clinical Scores:                                     ED Course as of 02/19/25 1439   Wed Feb 19, 2025   1420 Updated patient and daughter on imaging, plan to admit for MRI and LISSA  consult.    I updated the patient on current ED work up, including labs and imaging (if performed) with indication for admission. All questions and concerns addressed and ready for admit at this time.    [JG]   1430 Discussed with LISSETT Baugh with [facility]. He agrees to admit. No further recommendations. Labs pending.  [JG]      ED Course User Index  [JG] Hayley Vernon APRN       MDM: 64-year-old female presenting for evaluation of right leg and back pain.  Imaging is overall unremarkable.  Will plan on admission to observation unit for symptomatic control, MRI.      COMPLEXITY OF CARE  The patient requires admission.    Please note that portions of this document were completed with a voice recognition program.    Note Disclaimer: At [facility], we believe that sharing information builds trust and better relationships. You are receiving this note because you recently visited [facility]. It is possible you will see health information before a provider has talked with you about it. This kind of information can be easy to misunderstand. To help you fully understand what it means for your health, we urge you to discuss this note with your provider.         Jesus Montez MD  02/19/25 4389

## 2025-02-20 ENCOUNTER — ANESTHESIA EVENT (OUTPATIENT)
Dept: PAIN MEDICINE | Facility: HOSPITAL | Age: 65
End: 2025-02-20
Payer: COMMERCIAL

## 2025-02-20 ENCOUNTER — ANESTHESIA (OUTPATIENT)
Dept: PAIN MEDICINE | Facility: HOSPITAL | Age: 65
End: 2025-02-20
Payer: COMMERCIAL

## 2025-02-20 ENCOUNTER — APPOINTMENT (OUTPATIENT)
Dept: GENERAL RADIOLOGY | Facility: HOSPITAL | Age: 65
End: 2025-02-20
Payer: COMMERCIAL

## 2025-02-20 ENCOUNTER — APPOINTMENT (OUTPATIENT)
Dept: PAIN MEDICINE | Facility: HOSPITAL | Age: 65
End: 2025-02-20
Payer: COMMERCIAL

## 2025-02-20 LAB
ANION GAP SERPL CALCULATED.3IONS-SCNC: 8 MMOL/L (ref 5–15)
BUN SERPL-MCNC: 13 MG/DL (ref 8–23)
BUN/CREAT SERPL: 22 (ref 7–25)
CALCIUM SPEC-SCNC: 8.9 MG/DL (ref 8.6–10.5)
CHLORIDE SERPL-SCNC: 105 MMOL/L (ref 98–107)
CO2 SERPL-SCNC: 25 MMOL/L (ref 22–29)
CREAT SERPL-MCNC: 0.59 MG/DL (ref 0.57–1)
DEPRECATED RDW RBC AUTO: 46.5 FL (ref 37–54)
EGFRCR SERPLBLD CKD-EPI 2021: 101.4 ML/MIN/1.73
ERYTHROCYTE [DISTWIDTH] IN BLOOD BY AUTOMATED COUNT: 13.8 % (ref 12.3–15.4)
GLUCOSE SERPL-MCNC: 123 MG/DL (ref 65–99)
HCT VFR BLD AUTO: 43 % (ref 34–46.6)
HGB BLD-MCNC: 14 G/DL (ref 12–15.9)
MCH RBC QN AUTO: 30 PG (ref 26.6–33)
MCHC RBC AUTO-ENTMCNC: 32.6 G/DL (ref 31.5–35.7)
MCV RBC AUTO: 92.3 FL (ref 79–97)
PLATELET # BLD AUTO: 228 10*3/MM3 (ref 140–450)
PMV BLD AUTO: 9.6 FL (ref 6–12)
POTASSIUM SERPL-SCNC: 3.9 MMOL/L (ref 3.5–5.2)
RBC # BLD AUTO: 4.66 10*6/MM3 (ref 3.77–5.28)
SODIUM SERPL-SCNC: 138 MMOL/L (ref 136–145)
WBC NRBC COR # BLD AUTO: 6.91 10*3/MM3 (ref 3.4–10.8)

## 2025-02-20 PROCEDURE — 96376 TX/PRO/DX INJ SAME DRUG ADON: CPT

## 2025-02-20 PROCEDURE — 80048 BASIC METABOLIC PNL TOTAL CA: CPT | Performed by: PHYSICIAN ASSISTANT

## 2025-02-20 PROCEDURE — 25010000002 HYDROCORTISONE SOD SUC (PF) 250 MG RECONSTITUTED SOLUTION

## 2025-02-20 PROCEDURE — 97162 PT EVAL MOD COMPLEX 30 MIN: CPT

## 2025-02-20 PROCEDURE — 25510000001 IOPAMIDOL 41 % SOLUTION: Performed by: STUDENT IN AN ORGANIZED HEALTH CARE EDUCATION/TRAINING PROGRAM

## 2025-02-20 PROCEDURE — 85027 COMPLETE CBC AUTOMATED: CPT | Performed by: PHYSICIAN ASSISTANT

## 2025-02-20 PROCEDURE — 25010000002 MIDAZOLAM PER 1 MG: Performed by: STUDENT IN AN ORGANIZED HEALTH CARE EDUCATION/TRAINING PROGRAM

## 2025-02-20 PROCEDURE — 25010000002 METHYLPREDNISOLONE PER 80 MG: Performed by: STUDENT IN AN ORGANIZED HEALTH CARE EDUCATION/TRAINING PROGRAM

## 2025-02-20 PROCEDURE — G0378 HOSPITAL OBSERVATION PER HR: HCPCS

## 2025-02-20 PROCEDURE — 77003 FLUOROGUIDE FOR SPINE INJECT: CPT

## 2025-02-20 RX ORDER — FENTANYL CITRATE 50 UG/ML
50 INJECTION, SOLUTION INTRAMUSCULAR; INTRAVENOUS ONCE
Status: DISCONTINUED | OUTPATIENT
Start: 2025-02-20 | End: 2025-02-21 | Stop reason: HOSPADM

## 2025-02-20 RX ORDER — LIDOCAINE HYDROCHLORIDE 10 MG/ML
1 INJECTION, SOLUTION INFILTRATION; PERINEURAL ONCE
Status: DISCONTINUED | OUTPATIENT
Start: 2025-02-20 | End: 2025-02-21 | Stop reason: HOSPADM

## 2025-02-20 RX ORDER — IOPAMIDOL 408 MG/ML
10 INJECTION, SOLUTION INTRATHECAL
Status: COMPLETED | OUTPATIENT
Start: 2025-02-20 | End: 2025-02-20

## 2025-02-20 RX ORDER — METHYLPREDNISOLONE ACETATE 80 MG/ML
80 INJECTION, SUSPENSION INTRA-ARTICULAR; INTRALESIONAL; INTRAMUSCULAR; SOFT TISSUE ONCE
Status: COMPLETED | OUTPATIENT
Start: 2025-02-20 | End: 2025-02-20

## 2025-02-20 RX ORDER — MIDAZOLAM HYDROCHLORIDE 1 MG/ML
2 INJECTION, SOLUTION INTRAMUSCULAR; INTRAVENOUS ONCE AS NEEDED
Status: COMPLETED | OUTPATIENT
Start: 2025-02-20 | End: 2025-02-20

## 2025-02-20 RX ADMIN — METHOCARBAMOL TABLETS 750 MG: 750 TABLET, COATED ORAL at 18:25

## 2025-02-20 RX ADMIN — METHYLPREDNISOLONE ACETATE 80 MG: 80 INJECTION, SUSPENSION INTRA-ARTICULAR; INTRALESIONAL; INTRAMUSCULAR; SOFT TISSUE at 14:09

## 2025-02-20 RX ADMIN — Medication 10 ML: at 20:02

## 2025-02-20 RX ADMIN — FAMOTIDINE 20 MG: 20 TABLET, FILM COATED ORAL at 16:55

## 2025-02-20 RX ADMIN — MIDAZOLAM 2 MG: 1 INJECTION INTRAMUSCULAR; INTRAVENOUS at 14:06

## 2025-02-20 RX ADMIN — HYDROCORTISONE SODIUM SUCCINATE 250 MG: 250 INJECTION, POWDER, FOR SOLUTION INTRAMUSCULAR; INTRAVENOUS at 21:42

## 2025-02-20 RX ADMIN — HYDROCODONE BITARTRATE AND ACETAMINOPHEN 1 TABLET: 7.5; 325 TABLET ORAL at 18:25

## 2025-02-20 RX ADMIN — Medication 10 ML: at 09:00

## 2025-02-20 RX ADMIN — HYDROCORTISONE SODIUM SUCCINATE 250 MG: 250 INJECTION, POWDER, FOR SOLUTION INTRAMUSCULAR; INTRAVENOUS at 16:55

## 2025-02-20 RX ADMIN — IOPAMIDOL 10 ML: 408 INJECTION, SOLUTION INTRATHECAL at 14:09

## 2025-02-20 RX ADMIN — HYDROCORTISONE SODIUM SUCCINATE 250 MG: 250 INJECTION, POWDER, FOR SOLUTION INTRAMUSCULAR; INTRAVENOUS at 05:58

## 2025-02-20 RX ADMIN — FAMOTIDINE 20 MG: 20 TABLET, FILM COATED ORAL at 05:59

## 2025-02-20 RX ADMIN — METHOCARBAMOL TABLETS 750 MG: 750 TABLET, COATED ORAL at 05:59

## 2025-02-20 NOTE — H&P
CHIEF COMPLAINT:   Acute exacerbation of chronic low back pain    HISTORY OF PRESENT ILLNESS:  Ms. Dunn is a 64-year-old female who was admitted to the hospital yesterday, 2/19/2025 for acute onset of low back and right lower extremity pain for approximately 1 week.  She has a known history of cervical stenosis and had a recent knee scope last year.  She has previously had back pain for which she was treated with LESI last year.  Workup in the hospital included a CT of the lumbar spine that showed transitional anatomy/sacralization of L5.  There is multilevel degenerative disease, retrolisthesis of L4-5, facet arthropathy, foraminal and canal stenosis.  She was evaluated by the neurosurgery service who recommended trial of LESI.     The patient's pain is located in low back and radiates to right lower extremity.  It courses through her buttock, posterior thigh, across to her calf and shin and into the foot.  It is associated with numbness over her shin.  She denies weakness  Their pain is a 4-8/10.  The symptom is described as constant, sore, aching, dull, intermittent stabbing.  The pain is stable in severity.   Their symptoms are exacerbated by exertion and alleviated by rest and medications.  The pain is severe enough that it is interfering with her activities of daily living including mobility, quality sleep, caring for herself, using the restroom.  The conservative measures the patient has attempted recently without significant improvement include: Rest, heat, OTC medications.  In the hospital she was started on muscle relaxer, lidocaine patch, steroids, and pain medication with some improvement.      PAST MEDICAL HISTORY:  No current facility-administered medications on file prior to encounter.     Current Outpatient Medications on File Prior to Encounter   Medication Sig Dispense Refill    Calcium Carbonate-Vit D-Min (CALCIUM 1200 PO) Take 1 tablet by mouth Daily. HOLD PRIOR TO SURGERY      cyclobenzaprine  "(FLEXERIL) 10 MG tablet Take 1 tablet by mouth 2 (Two) Times a Day As Needed for Muscle Spasms. 60 tablet 2    Omega-3 Fatty Acids (fish oil) 1000 MG capsule capsule Take 1 capsule by mouth Daily With Breakfast. HOLD PRIOR TO SURGERY      rosuvastatin (Crestor) 10 MG tablet Take 1 tablet by mouth Daily. For cholesterol and labs due 3 mos 30 tablet 11    vitamin B-12 (CYANOCOBALAMIN) 500 MCG tablet Take 1 tablet by mouth Daily. HOLD PRIOR TO SURGERY      vitamin E 400 UNIT capsule Take 1 capsule by mouth Every Morning. HOLD PRIOR TO SURGERY      acetaminophen-codeine (TYLENOL with CODEINE #3) 300-30 MG per tablet Take 1 tablet by mouth Every 6 (Six) Hours As Needed for Moderate Pain or Severe Pain. (Patient not taking: Reported on 12/26/2024) 20 tablet 0    HYDROcodone-acetaminophen (NORCO) 5-325 MG per tablet Take 1 tablet by mouth Every 4 (Four) Hours As Needed for Severe Pain. (Patient not taking: Reported on 12/26/2024) 28 tablet 0       Past Medical History:   Diagnosis Date    GERD (gastroesophageal reflux disease)     Hyperlipidemia     Low back pain     Nausea and vomiting 12/01/2020    Osteoporosis          SOCIAL HISTORY:  Positive tobacco product use    REVIEW OF SYSTEMS:  No hematologic infectious or constitutional symptoms  Other review of systems non-contributory  Negative screen for SHARI      PHYSICAL EXAM:  /84 (BP Location: Left arm, Patient Position: Lying)   Pulse 70   Temp 36.2 °C (97.1 °F) (Oral)   Resp 18   Ht 157.5 cm (62\")   Wt 81.2 kg (179 lb)   SpO2 99%   BMI 32.74 kg/m²   Well-developed, well-nourished, no acute distress  Alert and oriented ×3  Extra ocular movements intact  Airway: Mallampati 2  Unlabored respirations  Extremities warm and well-perfused  Deep tendon reflexes normal in the bilateral patella  Positive right straight leg raise  5 out of 5 strength bilateral upper and lower extremities  Lumbar spine without obvious deformities or ecchymoses  Lumbar spine nontender " to palpation      DIAGNOSIS:  Post-Op Diagnosis Codes:     * Spinal stenosis, lumbar region with neurogenic claudication [M48.062]    PLAN:  1.  Lumbar 5 epidural steroid injections, up to 3. If pain control is acceptable after 1 or 2 injections, it was discussed with the patient that they may return for the subsequent injections if and when their pain returns.  The risks were discussed with the patient including failure of relief, worsening pain, Headache (post dural puncture headache), bleeding (epidural hematoma) and infection (epidural abscess or skin infection).  2.  Physical therapy exercises at home as prescribed by physical therapy or from the pain clinic handout.  Continuation of these exercises every day, or multiple times per week, even when the patient has good pain relief, was stressed to the patient as a preventative measure to decrease the frequency and severity of future pain episodes.  3.  Continue pain medicines as already prescribed.  If patient not currently taking any, it is recommended to begin Acetaminophen 1000 mg po q 8 hours.  If other medicines containing Acetaminophen are currently prescribed, maintain daily dose at 3000 mg.    4.  If they can tolerate NSAIDS, it is recommended to take Ibuprofen 600 mg po q 6 hours for 7 days during pain exacerbations.  Alternatively, they may substitute an NSAID of their choice (e.g. Aleve).  This may be taken at the same time as Acetaminophen.  5.  Heat and ice to the affected area as tolerated for pain control.    6.  Daily low impact exercise such as walking or water exercise was recommended to maintain overall health and aid in weight control.   7.  Follow up as needed for subsequent injections.

## 2025-02-20 NOTE — PLAN OF CARE
Goal Outcome Evaluation:  Plan of Care Reviewed With: patient           Outcome Evaluation: Pt 63 yo female presented to ED w lower back pain with right-sided sciatica pain. 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  Pt ambulated short distance 20ft Mod I rwx, significantly impaired due to pain RLE. Pt provided w gentle HEP including low back education. Pt may benefit from skilled PT acutely to address functional deficits, consider OP PT at [place], pt may  need rwx at [place]. Pt awaiting LESI.    Anticipated Discharge Disposition (PT): home with assist, home with outpatient therapy services

## 2025-02-20 NOTE — PROGRESS NOTES
This is a patient that was admitted yesterday for further evaluation by neurosurgery because of pain in her right buttocks that goes down her right leg.  When I am seeing her this morning when she is laying in bed she is pain-free.  She only gets the pain when she tries to stand and walk.  She has not attempted to stand or walk this morning.  Denies any numbness or tingling.  Denies any saddle anesthesia denies any urinary fecal incontinence or any new weakness.  1.  Right-sided lumbar radiculopathy with CT scan that shows multilevel degenerative disc disease as well as lumbar stenosis.  Most prominent seems to be at L4-L5.  Neurosurgery has seen the patient.  They would like to hold off on MRI at this time and the patient is scheduled for epidural injection and will reassess for improvement.  We have done a multimodal pain control approach.  Patient seems to be doing better.  All questions answered at this time.

## 2025-02-20 NOTE — PROGRESS NOTES
[facility] THORACIC/LUMBAR NEUROSURGERY PROGRESS NOTE      CC:Right leg pain      Subjective     Interval History: No significant overnight events.  Patient reports no pain while lying in bed but has not been up this morning.    ROS:  Constitutional: No fever, chills  MS: No back pain, + right leg pain  Neuro: + numbness, tingling right shin  : No difficulty voiding, no incontinence    Objective     Vital signs in last 24 hours:  Temp:  [97.3 °F (36.3 °C)-98.6 °F (37 °C)] 97.7 °F (36.5 °C)  Heart Rate:  [68-97] 68  Resp:  [16-18] 18  BP: (118-156)/(64-95) 137/85    Intake/Output this shift:  No intake/output data recorded.    LABS:  Results from last 7 days   Lab Units 02/20/25  0839 02/19/25  1438   WBC 10*3/mm3 6.91 7.73   HEMOGLOBIN g/dL 14.0 14.4   HEMATOCRIT % 43.0 42.8   PLATELETS 10*3/mm3 228 236     Results from last 7 days   Lab Units 02/20/25  0839 02/19/25  1438   SODIUM mmol/L 138 141   POTASSIUM mmol/L 3.9 4.1   CHLORIDE mmol/L 105 103   CO2 mmol/L 25.0 27.0   BUN mg/dL 13 14   CREATININE mg/dL 0.59 0.72   CALCIUM mg/dL 8.9 9.5   BILIRUBIN mg/dL  --  0.3   ALK PHOS U/L  --  64   ALT (SGPT) U/L  --  12   AST (SGOT) U/L  --  13   GLUCOSE mg/dL 123* 80         IMAGING STUDIES:  No new imaging to review    I personally viewed the patient's chart, it was also reviewed by and discussed with Dr Therese Salvador reviewed/changed: Yes  Pepcid 20 mg p.o. twice daily  Solu-Cortef 250 mg IV every 6 hours  Lidocaine patch every 24 hours  Robaxin-750 milligrams p.o. every 6 hours  Crestor 10 mg p.o. daily  Hydrocodone 7.51 p.o. every 6 hours as needed    Physical Exam:    General:  Awake & alert  Back: No midline lumbar tenderness to palpation.  Right low buttock tenderness to palpation  Motor:  Normal muscle strength, bulk and tone in lower extremities.  No fasciculations, rigidity, spasticity, or abnormal movements  Sensation:  Normal to light touch bilateral LE's except for right shin, decreased when compared to left  "shin  Station and Gait: Not tested  Extremities:  Wearing SCD's      Assessment & Plan     ASSESSMENT:      Sciatica    64-year-old female who presents with right lower extremity pain down the posterior aspect of her leg into her foot that worsens with weightbearing.  She does report numbness and tingling in her right shin.  She cannot specifically say if her symptoms are better this morning after being on IV steroids, states she has not been up out of bed yet.  She is scheduled to have an LESI today.  Patient is okay for discharge after.  Would recommend outpatient PT and office follow-up.  Neurosurgery will sign off at this time.  Please will free to call with any questions or concerns.    PLAN:   -OK to DC after LESI  -Outpt PT  -Office follow up in 3-4 weeks if symptoms not improving      I discussed the patient's findings and my recommendations with patient, nursing staff, primary care team, and Dr Saleh    During patient visit, I utilized appropriate personal protective equipment including gloves.  Appropriate PPE was worn during the entire visit.  Hand hygiene was completed before and after.      LOS: 0 days       Lay Pavon, APRN  2/20/2025  08:28 EST    \"Dictated utilizing Dragon dictation\".      "

## 2025-02-20 NOTE — THERAPY EVALUATION
Acute Care - Physical Therapy Initial Evaluation  [facility]     Patient Name: Wendy Dunn  : 1960  MRN: [id number]  Today's Date: 2025      Visit Dx:     ICD-10-CM ICD-9-CM   1. Radiculopathy of lumbosacral region  M54.17 724.4     Patient Active Problem List   Diagnosis    Osteoporosis    [place] in adults    Spinal stenosis in cervical region    Degeneration of cervical intervertebral disc    Colon cancer screening    Pain of upper abdomen    Gastroesophageal reflux disease    Nausea and vomiting    Weight loss    Fatty liver    Acute right lumbar radiculopathy    Complex tear of medial meniscus of right knee as current injury    Tendinitis of left shoulder    Sciatica     Past Medical History:   Diagnosis Date    GERD (gastroesophageal reflux disease)     Hyperlipidemia     Low back pain     Nausea and vomiting 2020    Osteoporosis      Past Surgical History:   Procedure Laterality Date    ANTERIOR CERVICAL DISCECTOMY W/ FUSION N/A 2018    Procedure: C4 to C6 anterior cervical discectomy, fusion, and instrumentation;  Surgeon: Padilla Saleh MD;  Location: [facility] MAIN OR;  Service:     COLONOSCOPY N/A 3/5/2018    Procedure: COLONOSCOPY TO CECUM AND TI; COLD POLYPECTOMY;  Surgeon: Benita Barnes MD;  Location: [facility] ENDOSCOPY;  Service:     KNEE ARTHROSCOPY Right 2024    Procedure: KNEE ARTHROSCOPY with partial medial and lateral menisectomy and debridement of arthritis;  Surgeon: Carmella Whitmore MD;  Location: [facility] OR OSC;  Service: Orthopedics;  Laterality: Right;    MAMMO BILATERAL  2015    normal    THYROIDECTOMY, PARTIAL      TOTAL ABDOMINAL HYSTERECTOMY       PT Assessment (Last 12 Hours)       PT Evaluation and Treatment       Row Name 25 1000          Physical Therapy Time and Intention    Subjective Information complains of;pain  -LH     Document Type evaluation  -LH     Mode of Treatment individual therapy;physical therapy  -LH     Patient Effort  good  -     Symptoms Noted During/After Treatment none  -       Row Name 02/20/25 1000          General Information    Patient Profile Reviewed yes  -     Patient Observations alert;cooperative;agree to therapy  -     Patient/Family/Caregiver Comments/Observations pt L SL in bed no acute distress  -     Prior Level of Function independent:  -     Existing Precautions/Restrictions fall;spinal  -     Benefits Reviewed patient:  -[facility] Name 02/20/25 1000          Living Environment    Current Living Arrangements home  -[facility] Name 02/20/25 1000          Pain    Pretreatment Pain Rating 8/10  -     Posttreatment Pain Rating 8/10  -     Pain Side/Orientation right;lower  buttock/back  -     Response to Pain Interventions no change per patient report  -[facility] Name 02/20/25 1000          Cognition    Affect/Mental Status (Cognition) WFL  -[facility] Name 02/20/25 1000          Range of Motion Comprehensive    General Range of Motion bilateral lower extremity ROM [facility]  -[facility] Name 02/20/25 1000          Strength Comprehensive (MMT)    General Manual Muscle Testing (MMT) Assessment no strength deficits identified  -[facility] Name 02/20/25 1000          Bed Mobility    Bed Mobility supine-sit;sit-supine  -     Supine-Sit [place] (Bed Mobility) modified independence  [facility]     Sit-Supine [place] (Bed Mobility) modified independence  -LH     Assistive Device (Bed Mobility) bed rails  -     Comment, (Bed Mobility) log rolling  -[facility] Name 02/20/25 1000          Transfers    Transfers sit-stand transfer;stand-sit transfer  -[facility] Name 02/20/25 1000          Sit-Stand Transfer    Sit-Stand [place] (Transfers) modified [facility]     Assistive Device (Sit-Stand Transfers) walker, front-wheeled  -[facility] Name 02/20/25 1000          Stand-Sit Transfer    Stand-Sit [place] (Transfers) modified independence  -LH     Assistive Device  (Stand-Sit Transfers) walker, front-wheeled  -       Row Name 02/20/25 1000          Gait/Stairs (Locomotion)    [place] (Gait) modified independence  -     Assistive Device (Gait) walker, front-wheeled  -     Distance in Feet (Gait) 20  -LH     Pattern (Gait) step-to;step-through  -     Deviations/Abnormal Patterns (Gait) right sided deviations;antalgic;weight shifting decreased;stride length decreased  -     Bilateral Gait Deviations forward flexed posture;heel strike decreased  -     Comment, (Gait/Stairs) significant pain limiting  -       Row Name 02/20/25 1000          Balance    Balance Assessment sitting static balance;standing static balance  -     Static Sitting Balance independent  -     Position, Sitting Balance unsupported;sitting edge of bed  -     Static Standing Balance modified independence  -     Position/Device Used, Standing Balance supported;walker, front-wheeled  -       Row Name 02/20/25 1000          Plan of Care Review    Plan of Care Reviewed With patient  -     Outcome Evaluation Pt 65 yo female presented to ED w lower back pain with right-sided sciatica pain. 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  Pt ambulated short distance 20ft Mod I rwx, significantly impaired due to pain RLE. Pt provided w gentle HEP including low back education. Pt may benefit from skilled PT acutely to address functional deficits, consider OP PT at [place], pt may  need rwx at [place]. Pt awaiting LESI.  -       Row Name 02/20/25 1000          Positioning and Restraints    Pre-Treatment Position in bed  -     Post Treatment Position bed  -     In Bed side lying left;call light within reach;encouraged to call for assist;exit alarm on;pillow between legs  -       Row Name 02/20/25 1000          Therapy Assessment/Plan (PT)    Rehab Potential (PT) good  -     Criteria for Skilled Interventions Met (PT)  yes  -     Therapy Frequency (PT) 3 times/wk  -       Row Name 02/20/25 1000          PT Evaluation Complexity    History, PT Evaluation Complexity 1-2 personal factors and/or comorbidities  -     Examination of Body Systems (PT Eval Complexity) total of 3 or more elements  -     Clinical Presentation (PT Evaluation Complexity) evolving  -     Clinical Decision Making (PT Evaluation Complexity) moderate complexity  -     Overall Complexity (PT Evaluation Complexity) moderate complexity  -       Row Name 02/20/25 1000          Physical Therapy Goals    Gait Training Goal Selection (PT) gait training, PT goal 1  -       Row Name 02/20/25 1000          Gait Training Goal 1 (PT)    Activity/Assistive Device (Gait Training Goal 1, PT) gait (walking locomotion);walker, rolling  -     [place] Level (Gait Training Goal 1, PT) modified independence  -     Distance (Gait Training Goal 1, PT) 150  -     Time Frame (Gait Training Goal 1, PT) 1 week  -               User Key  (r) = Recorded By, (t) = Taken By, (c) = Cosigned By      Initials Name Provider Type     Rosita France, PT Physical Therapist                    Physical Therapy Education       Title: PT OT SLP Therapies (In Progress)       Topic: Physical Therapy (In Progress)       Point: Mobility training (In Progress)       Learning Progress Summary            Patient Acceptance, E, NR by  at 2/20/2025 1020                      Point: Home exercise program (In Progress)       Learning Progress Summary            Patient Acceptance, E, NR by  at 2/20/2025 1020                      Point: Body mechanics (In Progress)       Learning Progress Summary            Patient Acceptance, E, NR by  at 2/20/2025 1020                      Point: Precautions (In Progress)       Learning Progress Summary            Patient Acceptance, E, NR by  at 2/20/2025 1020                                      User Key       Initials Effective Dates Name  Provider Type Discipline     06/16/21 -  Rosita France, PT Physical Therapist PT                  PT Recommendation and Plan  Anticipated Discharge Disposition (PT): home with assist, home with outpatient therapy services  Planned Therapy Interventions (PT): balance training, bed mobility training, gait training, home exercise program, stair training, ROM (range of motion), strengthening, stretching  Therapy Frequency (PT): 3 times/wk  Plan of Care Reviewed With: patient  Outcome Evaluation: Pt 63 yo female presented to ED w lower back pain with right-sided sciatica pain. 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  Pt ambulated short distance 20ft Mod I rwx, significantly impaired due to pain RLE. Pt provided w gentle HEP including low back education. Pt may benefit from skilled PT acutely to address functional deficits, consider OP PT at [place], pt may  need rwx at [place]. Pt awaiting LESI.   Outcome Measures       Row Name 02/20/25 1000             How much help from another person do you currently need...    Turning from your back to your side while in flat bed without using bedrails? 4  -LH      Moving from lying on back to sitting on the side of a flat bed without bedrails? 4  -LH      Moving to and from a bed to a chair (including a wheelchair)? 4  -LH      Standing up from a chair using your arms (e.g., wheelchair, bedside chair)? 3  -LH      Climbing 3-5 steps with a railing? 3  -LH      To walk in hospital room? 3  -      AM-PAC 6 Clicks Score (PT) 21  -         Functional Assessment    Outcome Measure Options AM-PAC 6 Clicks Basic Mobility (PT)  -                User Key  (r) = Recorded By, (t) = Taken By, (c) = Cosigned By      Initials Name Provider Type     Rosita France, PT Physical Therapist                     Time Calculation:    PT Charges       Row Name 02/20/25 1020             Time Calculation    Start Time 0850  -       Stop Time 0905  -      Time Calculation (min) 15 min  -      PT Received On 02/20/25  -      PT - Next Appointment 02/21/25  -      PT Goal Re-Cert Due Date 02/27/25  -                User Key  (r) = Recorded By, (t) = Taken By, (c) = Cosigned By      Initials Name Provider Type     Rosita France, PT Physical Therapist                  Therapy Charges for Today       Code Description Service Date Service Provider Modifiers Qty    [id number]  PT EVAL MOD COMPLEXITY 3 2/20/2025 Rosita France, PT GP 1            PT G-Codes  Outcome Measure Options: AM-PAC 6 Clicks Basic Mobility (PT)  AM-PAC 6 Clicks Score (PT): 21    Rosita France, PT  2/20/2025

## 2025-02-20 NOTE — PLAN OF CARE
Goal Outcome Evaluation:              Outcome Evaluation: Pt. 64 yr old AOX4 and able to verbalized needs. During this shift Pt had an epidural.  Pt to stay over night for further treatment. PT consulted tomorrow. Pt does not have any other questions at this time.

## 2025-02-20 NOTE — DISCHARGE SUMMARY
ED OBSERVATION PROGRESS/DISCHARGE SUMMARY    Date of Admission: 2/19/2025   LOS: 0 days   PCP: Wojciech Garcia MD    Final Diagnosis Acute lower back pain with right sided sciatica      Subjective     Hospital Outcome:     Wendy Dunn is a 64 y.o. female who was admitted to the observation unit for further evaluation of acute lower back pain with right lower extremity radiculopathy.  Patient reports over the last week she is having worse pain in the buttocks that shoots down to her right foot.  There is some numbness and tingling in the right leg.  She denies lower extremity weakness.  Her pain is exacerbated with ambulation.  No loss of bowel or bladder control.  No saddle anesthesia.  No fever or chills.  No remote history of CVA.  No past medical history of back surgery or back injuries.  She denies trauma.     In the ED patient's chemistry, CBC were normal.  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  Plan for LESI this morning.  The patient fails to improve may need to obtain MRI.    2/20/2025  Patient resting comfortably overnight, no acute complaints.LESI likely today    Will continue to monitor and treat with multimodal pain control.      ROS:  General: no fevers, chills  Respiratory: no cough, dyspnea  Cardiovascular: no chest pain, palpitations  Abdomen: No abdominal pain, nausea, vomiting, or diarrhea  Neurologic: No focal weakness    Objective   Physical Exam:  I have reviewed the vital signs.     General Appearance:    Alert, cooperative, no distress  Head:    Normocephalic, atraumatic  Eyes:    Sclerae anicteric  Neck:   Supple, no mass  Lungs: Clear to auscultation bilaterally, respirations unlabored  Heart: Regular rate and rhythm, S1 and S2 normal, no murmur, rub or gallop  Abdomen:  Soft, nontender, bowel sounds active, nondistended  Extremities: No clubbing, cyanosis, or edema to lower extremities  Pulses:  2+ and symmetric in  distal lower extremities  Skin: No rashes   Neurologic: Oriented x3, Normal strength to extremities    Results Review:    I have reviewed the labs, radiology results and diagnostic studies.    Results from last 7 days   Lab Units 02/20/25  0839   WBC 10*3/mm3 6.91   HEMOGLOBIN g/dL 14.0   HEMATOCRIT % 43.0   PLATELETS 10*3/mm3 228     Results from last 7 days   Lab Units 02/20/25  0839 02/19/25  1438   SODIUM mmol/L 138 141   POTASSIUM mmol/L 3.9 4.1   CHLORIDE mmol/L 105 103   CO2 mmol/L 25.0 27.0   BUN mg/dL 13 14   CREATININE mg/dL 0.59 0.72   CALCIUM mg/dL 8.9 9.5   BILIRUBIN mg/dL  --  0.3   ALK PHOS U/L  --  64   ALT (SGPT) U/L  --  12   AST (SGOT) U/L  --  13   GLUCOSE mg/dL 123* 80     Imaging Results (Last 24 Hours)       Procedure Component Value Units Date/Time    [place] Guided Pain Management Spine [[id number]] Resulted: 02/20/25 1418     Updated: 02/20/25 1418    Narrative:      This procedure was auto-finalized with no dictation required.            I have reviewed the medications.     Discharge Medications        New Medications        Instructions Start Date   lidocaine 5 %  Commonly known as: LIDODERM   1 patch, Transdermal, Every 24 Hours, Remove & Discard patch within 12 hours or as directed by MD      methocarbamol 500 MG tablet  Commonly known as: ROBAXIN   500 mg, Oral, 3 Times Daily      methylPREDNISolone 4 MG dose pack  Commonly known as: MEDROL   Take as directed on package instructions.             Continue These Medications        Instructions Start Date   CALCIUM 1200 PO   1 tablet, Daily      fish oil 1000 MG capsule capsule   1,000 mg, Daily With Breakfast      rosuvastatin 10 MG tablet  Commonly known as: Crestor   10 mg, Oral, Daily, For cholesterol and labs due 3 mos      vitamin B-12 500 MCG tablet  Commonly known as: CYANOCOBALAMIN   500 mcg, Daily      vitamin E 400 UNIT capsule   400 Units, Every Morning             Stop These Medications      acetaminophen-codeine 300-30 MG per  tablet  Commonly known as: TYLENOL with CODEINE #3     cyclobenzaprine 10 MG tablet  Commonly known as: FLEXERIL     HYDROcodone-acetaminophen 5-325 MG per tablet  Commonly known as: NORCO             ---------------------------------------------------------------------------------------------  Assessment & Plan   Assessment/Problem List    Acute midline low back pain with right-sided sciatica      Plan:    Acute lower back pain  Right-sided sciatica  -Decadron 4 mg every 6  -Norco 7.5/325 every 6  -Robaxin 500 mg 3 times daily  -Lidoderm patch  -LESI complete  -Neurosurgery is evaluated with the plan above.     Dyslipidemia  -Continue with home Crestor              VTE Prophylaxis:  Mechanical VTE prophylaxis orders are present.    Disposition: Discharge    Follow-up after Discharge: Physical therapy, neurosurgery, primary care    This note will serve as a progress    Dewey Haq III, PA 02/22/25 18:44 EST    I have worn appropriate PPE during this patient encounter, sanitized my hands both with entering and exiting patient's room.      35 minutes has been spent by [facility] providers in the care of this patient while under observation status

## 2025-02-20 NOTE — ANESTHESIA PROCEDURE NOTES
PAIN Epidural block      Patient reassessed immediately prior to procedure    Patient location during procedure: pain clinic  Indication:procedure for pain  Performed By  Anesthesiologist: Tequila Rivera MD  Preanesthetic Checklist  Completed: patient identified, risks and benefits discussed, surgical consent, monitors and equipment checked, pre-op evaluation and timeout performed  Additional Notes  Needle placement guided by fluoroscopy and confirmed with CARMELA and contrast injection.    ** Transitional anatomy noted with sacralization of L5    Diagnosis:  Post-Op Diagnosis Codes:     * Spinal stenosis, lumbar region with neurogenic claudication [M48.062]    Sedation: Versed 2 mg  Sedation time: 2:06 - 2:18pm  Prep:  Pt Position:prone  Sterile Tech:gloves, sterile barrier and mask  Prep:chlorhexidine gluconate and isopropyl alcohol  Monitoring:EKG, continuous pulse oximetry and blood pressure monitoring  Procedure:Sedation: yes     Approach:right paramedian  Guidance: fluoroscopy  Location:lumbar  Level:L5-S1 (Intralaminar)  Needle Type:Tuohy  Needle Gauge:20 G  Aspiration:negative  Medications:  Preservative Free Saline:3mL  Isovue:1mL  Depomedrol:80mg  Post Assessment:  Post-procedure: Bandaid.  Pt Tolerance:patient tolerated the procedure well with no apparent complications  Complications:no

## 2025-02-20 NOTE — PLAN OF CARE
Goal Outcome Evaluation:    Patient admitted for sciatica.  Patient sleeping between care.  PT and pain management consults.

## 2025-02-21 ENCOUNTER — READMISSION MANAGEMENT (OUTPATIENT)
Dept: CALL CENTER | Facility: HOSPITAL | Age: 65
End: 2025-02-21
Payer: COMMERCIAL

## 2025-02-21 VITALS
BODY MASS INDEX: 32.94 KG/M2 | RESPIRATION RATE: 16 BRPM | HEART RATE: 63 BPM | WEIGHT: 179 LBS | DIASTOLIC BLOOD PRESSURE: 73 MMHG | SYSTOLIC BLOOD PRESSURE: 124 MMHG | HEIGHT: 62 IN | TEMPERATURE: 98.4 F | OXYGEN SATURATION: 96 %

## 2025-02-21 PROBLEM — M54.41 ACUTE MIDLINE LOW BACK PAIN WITH RIGHT-SIDED SCIATICA: Status: ACTIVE | Noted: 2025-02-21

## 2025-02-21 PROCEDURE — 97530 THERAPEUTIC ACTIVITIES: CPT

## 2025-02-21 PROCEDURE — G0378 HOSPITAL OBSERVATION PER HR: HCPCS

## 2025-02-21 PROCEDURE — 96376 TX/PRO/DX INJ SAME DRUG ADON: CPT

## 2025-02-21 PROCEDURE — 25010000002 HYDROCORTISONE SOD SUC (PF) 250 MG RECONSTITUTED SOLUTION

## 2025-02-21 RX ORDER — METHOCARBAMOL 500 MG/1
500 TABLET, FILM COATED ORAL 3 TIMES DAILY
Qty: 21 TABLET | Refills: 0 | Status: SHIPPED | OUTPATIENT
Start: 2025-02-21

## 2025-02-21 RX ORDER — METHYLPREDNISOLONE 4 MG/1
TABLET ORAL
Qty: 21 TABLET | Refills: 0 | Status: SHIPPED | OUTPATIENT
Start: 2025-02-21

## 2025-02-21 RX ORDER — LIDOCAINE 50 MG/G
1 PATCH TOPICAL EVERY 24 HOURS
Qty: 14 PATCH | Refills: 0 | Status: SHIPPED | OUTPATIENT
Start: 2025-02-21

## 2025-02-21 RX ADMIN — Medication 10 ML: at 09:25

## 2025-02-21 RX ADMIN — FAMOTIDINE 20 MG: 20 TABLET, FILM COATED ORAL at 05:20

## 2025-02-21 RX ADMIN — HYDROCODONE BITARTRATE AND ACETAMINOPHEN 1 TABLET: 7.5; 325 TABLET ORAL at 11:27

## 2025-02-21 RX ADMIN — METHOCARBAMOL TABLETS 750 MG: 750 TABLET, COATED ORAL at 00:29

## 2025-02-21 RX ADMIN — METHOCARBAMOL TABLETS 750 MG: 750 TABLET, COATED ORAL at 12:04

## 2025-02-21 RX ADMIN — HYDROCORTISONE SODIUM SUCCINATE 250 MG: 250 INJECTION, POWDER, FOR SOLUTION INTRAMUSCULAR; INTRAVENOUS at 11:27

## 2025-02-21 RX ADMIN — METHOCARBAMOL TABLETS 750 MG: 750 TABLET, COATED ORAL at 05:20

## 2025-02-21 RX ADMIN — HYDROCORTISONE SODIUM SUCCINATE 250 MG: 250 INJECTION, POWDER, FOR SOLUTION INTRAMUSCULAR; INTRAVENOUS at 04:10

## 2025-02-21 RX ADMIN — ROSUVASTATIN CALCIUM 10 MG: 20 TABLET, FILM COATED ORAL at 09:24

## 2025-02-21 NOTE — OUTREACH NOTE
Prep Survey      Flowsheet Row Responses   [facility] patient discharged from? [place]   Is LACE score < 7 ? Yes   Eligibility [facility]   Date of Admission 02/19/25   Date of Discharge 02/21/25   Discharge Disposition Home or Self Care   Discharge diagnosis Acute midline low back pain with right-sided sciatica   Does the patient have one of the following disease processes/diagnoses(primary or secondary)? Other   Does the patient have Home health ordered? No   Is there a DME ordered? No   Prep survey completed? Yes            Brigid Denson Registered Nurse

## 2025-02-21 NOTE — THERAPY TREATMENT NOTE
Acute Care - Physical Therapy Treatment Note  [facility]     Patient Name: Wendy Dunn  : 1960  MRN: [id number]  Today's Date: 2025      Visit Dx:     ICD-10-CM ICD-9-CM   1. Radiculopathy of lumbosacral region  M54.17 724.4   2. Spinal stenosis of lumbar region with neurogenic claudication  M48.062 724.03     Patient Active Problem List   Diagnosis    Osteoporosis    Pica in adults    Spinal stenosis in cervical region    Degeneration of cervical intervertebral disc    Colon cancer screening    Pain of upper abdomen    Gastroesophageal reflux disease    Nausea and vomiting    Weight loss    Fatty liver    Acute right lumbar radiculopathy    Complex tear of medial meniscus of right knee as current injury    Tendinitis of left shoulder    Sciatica     Past Medical History:   Diagnosis Date    GERD (gastroesophageal reflux disease)     Hyperlipidemia     Low back pain     Nausea and vomiting 2020    Osteoporosis      Past Surgical History:   Procedure Laterality Date    ANTERIOR CERVICAL DISCECTOMY W/ FUSION N/A 2018    Procedure: C4 to C6 anterior cervical discectomy, fusion, and instrumentation;  Surgeon: Padilla Saleh MD;  Location: [facility] MAIN OR;  Service:     COLONOSCOPY N/A 3/5/2018    Procedure: COLONOSCOPY TO CECUM AND TI; COLD POLYPECTOMY;  Surgeon: Benita Barnes MD;  Location: [facility] ENDOSCOPY;  Service:     KNEE ARTHROSCOPY Right 2024    Procedure: KNEE ARTHROSCOPY with partial medial and lateral menisectomy and debridement of arthritis;  Surgeon: Carmella Whitmore MD;  Location: [facility] OR OSC;  Service: Orthopedics;  Laterality: Right;    MAMMO BILATERAL  2015    normal    THYROIDECTOMY, PARTIAL      TOTAL ABDOMINAL HYSTERECTOMY       PT Assessment (Last 12 Hours)       PT Evaluation and Treatment       Row Name 25 0900          Physical Therapy Time and Intention    Subjective Information no complaints  -LH     Document Type therapy note (daily note)   -     Mode of Treatment individual therapy;physical therapy  -     Patient Effort good  -     Symptoms Noted During/After Treatment none  -       Row Name 02/21/25 0900          General Information    Patient Profile Reviewed yes  -     Patient/Family/Caregiver Comments/Observations pt supine in  bed no acute distress  -     Prior Level of Function independent:  -     Existing Precautions/Restrictions fall;spinal  -     Benefits Reviewed patient:  -       Row Name 02/21/25 0900          Living Environment    Current Living Arrangements home  -       Row Name 02/21/25 0900          Pain    Pretreatment Pain Rating 2/10  -     Posttreatment Pain Rating 2/10  -     Pain Location knee  -     Pain Side/Orientation left  -     Response to Pain Interventions activity level improved  -       Row Name 02/21/25 0900          Bed Mobility    Supine-Sit [place] (Bed Mobility) modified independence  -     Sit-Supine [place] (Bed Mobility) modified independence  -     Assistive Device (Bed Mobility) bed rails  -     Comment, (Bed Mobility) log rolling  -       Row Name 02/21/25 0900          Sit-Stand Transfer    Sit-Stand [place] (Transfers) modified [facility]     Assistive Device (Sit-Stand Transfers) walker, front-wheeled  -       Row Name 02/21/25 0900          Stand-Sit Transfer    Stand-Sit [place] (Transfers) modified [facility]     Assistive Device (Stand-Sit Transfers) walker, front-wheeled  -       Row Name 02/21/25 0900          Gait/Stairs (Locomotion)    [place] Level (Gait) modified independence  [facility]     Assistive Device (Gait) walker, front-wheeled  -     Distance in Feet (Gait) 80  -LH     Pattern (Gait) step-to  -     Deviations/Abnormal Patterns (Gait) left sided deviations;antalgic;weight shifting decreased  -     Bilateral Gait Deviations forward flexed posture;heel strike decreased  -     Comment, (Gait/Stairs) and 30ft  SBA no AD  -       Row Name 02/21/25 0900          Motor Skills    Therapeutic Exercise --  Aps, LAQs x 10  -       Row Name 02/21/25 0900          Plan of Care Review    Plan of Care Reviewed With patient  -     Progress improving  -     Outcome Evaluation Pt w much improved mobility today s/p injection, ambulated 80ft Mod I rwx c/o chronic L knee pain, Knee sleeve was applied in addition to supportive shoes w good relief. Would recommend rwx for home use , pt reports he has her husbands to use however he is currently on it post knee sx. CCP- please order rwx for home use. Consider Op PT as needed in future pending LISSA recommendations.  -       Row Name 02/21/25 0900          Positioning and Restraints    Pre-Treatment Position in bed  -     Post Treatment Position chair  -     In Chair reclined;call light within reach;encouraged to call for assist;exit alarm on;notified [facility]  -               User Key  (r) = Recorded By, (t) = Taken By, (c) = Cosigned By      Initials Name Provider Type     Rosita France, PT Physical Therapist                    Physical Therapy Education       Title: PT OT SLP Therapies (In Progress)       Topic: Physical Therapy (In Progress)       Point: Mobility training (In Progress)       Learning Progress Summary            Patient Acceptance, E, NR by  at 2/21/2025 1039    Acceptance, E, VU by  at 2/20/2025 2342    Acceptance, E, NR by  at 2/20/2025 1020                      Point: Home exercise program (In Progress)       Learning Progress Summary            Patient Acceptance, E, NR by  at 2/21/2025 1039    Acceptance, E, VU by  at 2/20/2025 2342    Acceptance, E, NR by  at 2/20/2025 1020                      Point: Body mechanics (In Progress)       Learning Progress Summary            Patient Acceptance, E, NR by  at 2/21/2025 1039    Acceptance, E, VU by  at 2/20/2025 2342    Acceptance, E, NR by  at 2/20/2025 1020                      Point:  Precautions (In Progress)       Learning Progress Summary            Patient Acceptance, E, NR by  at 2/21/2025 1039    Acceptance, E, VU by  at 2/20/2025 2342    Acceptance, E, NR by  at 2/20/2025 1020                                      User Key       Initials Effective Dates Name Provider Type Discipline     06/16/21 -  Rosita France, PT Physical Therapist PT     12/08/23 -  Myah Lara, RN Registered Nurse Nurse                  PT Recommendation and Plan  Anticipated Discharge Disposition (PT): home with assist, home with outpatient therapy services  Planned Therapy Interventions (PT): balance training, bed mobility training, gait training, home exercise program, stair training, ROM (range of motion), strengthening, stretching  Therapy Frequency (PT): 3 times/wk  Plan of Care Reviewed With: patient  Progress: improving  Outcome Evaluation: Pt w much improved mobility today s/p injection, ambulated 80ft Mod I rwx c/o chronic L knee pain, Knee sleeve was applied in addition to supportive shoes w good relief. Would recommend rwx for home use , pt reports he has her husbands to use however he is currently on it post knee sx. CCP- please order rwx for home use. Consider Op PT as needed in future pending LISSA recommendations.   Outcome Measures       Row Name 02/21/25 1000 02/20/25 1000          How much help from another person do you currently need...    Turning from your back to your side while in flat bed without using bedrails? 4  - 4  -LH     Moving from lying on back to sitting on the side of a flat bed without bedrails? 4  - 4  -LH     Moving to and from a bed to a chair (including a wheelchair)? 4  - 4  -LH     Standing up from a chair using your arms (e.g., wheelchair, bedside chair)? 4  - 3  -LH     Climbing 3-5 steps with a railing? 4  - 3  -LH     To walk in hospital room? 4  - 3  -LH     AM-PAC 6 Clicks Score (PT) 24  - 21  -LH        Functional Assessment    Outcome  Measure Options AM-PAC 6 Clicks Basic Mobility (PT)  - AM-PAC 6 Clicks Basic Mobility (PT)  -               User Key  (r) = Recorded By, (t) = Taken By, (c) = Cosigned By      Initials Name Provider Type     Rosita France, PT Physical Therapist                     Time Calculation:    PT Charges       Row Name 02/21/25 1040             Time Calculation    Start Time 0850  -      Stop Time 0902  -      Time Calculation (min) 12 min  -      PT Received On 02/21/25  -                User Key  (r) = Recorded By, (t) = Taken By, (c) = Cosigned By      Initials Name Provider Type     Rosita France, PT Physical Therapist                  Therapy Charges for Today       Code Description Service Date Service Provider Modifiers Qty    [id number]  PT EVAL MOD COMPLEXITY 3 2/20/2025 Rosita France, PT GP 1    [id number]  PT THERAPEUTIC ACT EA 15 MIN 2/21/2025 Rosita France, PT GP 1            PT G-Codes  Outcome Measure Options: AM-PAC 6 Clicks Basic Mobility (PT)  AM-PAC 6 Clicks Score (PT): 24    Rosita France PT  2/21/2025

## 2025-02-21 NOTE — PLAN OF CARE
Goal Outcome Evaluation:    Patient admitted for sciatica.  Patient received epidural toady.  Patient sleeping between care.  PT consult.

## 2025-02-21 NOTE — DISCHARGE SUMMARY
ED OBSERVATION PROGRESS/DISCHARGE SUMMARY    Date of Admission: 2/19/2025   LOS: 0 days   PCP: Wojciech Garcia MD    Final Diagnosis pending      Subjective     Hospital Outcome:   Wendy Dunn is a 64 y.o. female who was admitted to the observation unit for further evaluation of acute lower back pain with right lower extremity radiculopathy.  Patient reports over the last week she is having worse pain in the buttocks that shoots down to her right foot.  There is some numbness and tingling in the right leg.  She denies lower extremity weakness.  Her pain is exacerbated with ambulation.  No loss of bowel or bladder control.  No saddle anesthesia.  No fever or chills.  No remote history of CVA.  No past medical history of back surgery or back injuries.  She denies trauma.     In the ED patient's chemistry, CBC were normal.  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  Plan for LESI this morning.  The patient fails to improve may need to obtain MRI.     2/20/2025  Patient resting comfortably overnight, no acute complaints. LESI performed by Pain Management, Dr. Rivera, and patient tolerated procedure well. Continue multimodal analgesic medication with Neurosurgery re-eval in am.    2/21/2025 2/21/2025     12:44 PM.  Patient has been evaluated by physical therapy and is being fit for discharge.  Patient states she is feeling significantly better than at the time of her admission.  She would like to go home at this time.  Will go ahead and write for 5 days prednisone, Robaxin 500 mg 3 times daily, Lidoderm patches for localized pain.  Will set the patient up with ambulatory physical therapy at discharge.  Patient to follow-up with neurosurgery in 3 to 4 weeks if symptoms do not return to baseline.    ROS:  General: no fevers, chills  Respiratory: no cough, dyspnea  Cardiovascular: no chest pain, palpitations  Abdomen: No abdominal pain, nausea,  vomiting, or diarrhea  Neurologic: No focal weakness    Objective   Physical Exam:  I have reviewed the vital signs.     General Appearance:    Alert, cooperative, no distress  Head:    Normocephalic, atraumatic  Eyes:    Sclerae anicteric  Neck:   Supple, no mass  Lungs: Clear to auscultation bilaterally, respirations unlabored  Heart: Regular rate and rhythm, S1 and S2 normal, no murmur, rub or gallop  Abdomen:  Soft, nontender, bowel sounds active, nondistended  Extremities: No clubbing, cyanosis, or edema to lower extremities  Pulses:  2+ and symmetric in distal lower extremities  Skin: No rashes   Neurologic: Oriented x3, Normal strength to extremities    Results Review:    I have reviewed the labs, radiology results and diagnostic studies.    Results from last 7 days   Lab Units 02/20/25  0839   WBC 10*3/mm3 6.91   HEMOGLOBIN g/dL 14.0   HEMATOCRIT % 43.0   PLATELETS 10*3/mm3 228     Results from last 7 days   Lab Units 02/20/25  0839 02/19/25  1438   SODIUM mmol/L 138 141   POTASSIUM mmol/L 3.9 4.1   CHLORIDE mmol/L 105 103   CO2 mmol/L 25.0 27.0   BUN mg/dL 13 14   CREATININE mg/dL 0.59 0.72   CALCIUM mg/dL 8.9 9.5   BILIRUBIN mg/dL  --  0.3   ALK PHOS U/L  --  64   ALT (SGPT) U/L  --  12   AST (SGOT) U/L  --  13   GLUCOSE mg/dL 123* 80     Imaging Results (Last 24 Hours)       Procedure Component Value Units Date/Time    [place] Guided Pain Management Spine [[id number]] Resulted: 02/20/25 1418     Updated: 02/20/25 1418    Narrative:      This procedure was auto-finalized with no dictation required.            I have reviewed the medications.     Discharge Medications        New Medications        Instructions Start Date   lidocaine 5 %  Commonly known as: LIDODERM   1 patch, Transdermal, Every 24 Hours, Remove & Discard patch within 12 hours or as directed by MD      methocarbamol 500 MG tablet  Commonly known as: ROBAXIN   500 mg, Oral, 3 Times Daily      methylPREDNISolone 4 MG dose pack  Commonly known as:  MEDROL   Take as directed on package instructions.             Continue These Medications        Instructions Start Date   CALCIUM 1200 PO   1 tablet, Daily      fish oil 1000 MG capsule capsule   1,000 mg, Daily With Breakfast      rosuvastatin 10 MG tablet  Commonly known as: Crestor   10 mg, Oral, Daily, For cholesterol and labs due 3 mos      vitamin B-12 500 MCG tablet  Commonly known as: CYANOCOBALAMIN   500 mcg, Daily      vitamin E 400 UNIT capsule   400 Units, Every Morning             Stop These Medications      acetaminophen-codeine 300-30 MG per tablet  Commonly known as: TYLENOL with CODEINE #3     cyclobenzaprine 10 MG tablet  Commonly known as: FLEXERIL     HYDROcodone-acetaminophen 5-325 MG per tablet  Commonly known as: NORCO             ---------------------------------------------------------------------------------------------  Assessment & Plan   Assessment/Problem List    Acute midline low back pain with right-sided sciatica      Plan:  Acute lower back pain  Right-sided sciatica  -Decadron 4 mg every 6  -Norco 7.5/325 every 6  -Robaxin 500 mg 3 times daily  -Lidoderm patch  -Neurosurgery following-with plan for re-eval today  -LESI performed by Pain Management on 2/20/25     Dyslipidemia  -Continue with home Crestor       Disposition: Discharge    Follow-up after Discharge: Physical therapy, neurosurgery, primary care.    This note will serve as a discharge summary    Dewey Haq III, PA 02/22/25 18:41 EST    I have worn appropriate PPE during this patient encounter, sanitized my hands both with entering and exiting patient's room.      31 minutes has been spent by [facility] providers in the care of this patient while under observation status

## 2025-02-21 NOTE — DISCHARGE INSTRUCTIONS
Take the prednisone 20 mg twice daily for 5 days.  Take the Robaxin 500 mg every 8 hours as needed for muscle spasm.  You can apply the prescribed lidocaine patch to the painful area in your lower back.  1 patch per 24-hour period.  Do not leave the patch on for greater than 12 hours.    Ambulatory referral will be placed for you at discharge for physical therapy.  Neurosurgery would like for you to follow-up in 3 to 4 weeks if your pain has not resolved completely and you have not returned to baseline.    Return to the ER should you have any further acute back pain symptoms.

## 2025-02-21 NOTE — PLAN OF CARE
Goal Outcome Evaluation:              Pt. 64 yr old AOX4 and able to verbalized needs. IV removed. Discharged summary provided and education complete. Pt instructed to follow up with PCP and Neurosurgery. Pt left with an ambulatory referral for Physical therapy. Pt gathered all her belongings and she did not have any other questions at the time of discharged. Pt left observation unit via private vehicle.

## 2025-02-21 NOTE — PLAN OF CARE
Goal Outcome Evaluation:  Plan of Care Reviewed With: patient        Progress: improving  Outcome Evaluation: Pt w much improved mobility today s/p injection, ambulated 80ft Mod I rwx c/o chronic L knee pain, Knee sleeve was applied in addition to supportive shoes w good relief. Would recommend rwx for home use , pt reports he has her husbands to use however he is currently on it post knee sx. CCP- please order rwx for home use. Consider Op PT as needed in future pending LISSA recommendations.    Anticipated Discharge Disposition (PT): home with assist, home with outpatient therapy services

## 2025-02-21 NOTE — PROGRESS NOTES
MD Attestation Note    SHARED VISIT: This visit was performed by BOTH a physician and an APC. The substantive portion of the medical decision making was performed by this attesting physician who made or approved the management plan and takes responsibility for patient management. All studies in the APC note (if performed) were independently interpreted by me.       My personal findings are:        Subjective:  Patient admitted for intractable low back pain.  She underwent lumbar epidural steroid injection yesterday and reports symptomatic relief today.  She has been up and ambulating today.  She is still having some chronic pain in her left knee.        Objective:  GENERAL: Awake, alert, in no acute distress.  Sitting up in bedside chair.  HENT:  Normocephalic, atraumatic. Nares patent.   EYES: Pupils equal, reactive. Extraocular movements normal.   RESPIRATORY: Normal effort.   CARDIOVASCULAR: Regular rhythm, normal rate.   ABDOMEN:  Nontender. Abdomen nondistended.   NEUROLOGIC: Cranial nerves II-XII normal. Motor strength 5/5 in extremities.   EXTREMITIES: No pedal edema. 2+ pedal pulses bilaterally. No deformity.   SKIN:  No rash, normal to inspection.          Assessment/ Plan:  Lumbar radiculopathy, acute  Symptoms improved today following lumbar epidural steroid injection yesterday  Continue multimodal pain control  PT eval  Anticipate discharge home with outpatient neurosurgery follow-up

## 2025-02-24 ENCOUNTER — TRANSITIONAL CARE MANAGEMENT TELEPHONE ENCOUNTER (OUTPATIENT)
Dept: CALL CENTER | Facility: HOSPITAL | Age: 65
End: 2025-02-24
Payer: COMMERCIAL

## 2025-02-24 NOTE — OUTREACH NOTE
Call Center TCM Note      Flowsheet Row Responses   [facility] patient discharged from? [facility]   Does the patient have one of the following disease processes/diagnoses(primary or secondary)? Other   TCM attempt successful? No   Unsuccessful attempts Attempt 1            Cass Smith MA    2/24/2025, 15:09 EST

## 2025-02-24 NOTE — OUTREACH NOTE
Call Center TCM Note      Flowsheet Row Responses   [facility] [place] patient discharged from? [facility]   Does the patient have one of the following disease processes/diagnoses(primary or secondary)? Other   TCM attempt successful? No   Unsuccessful attempts Attempt 2            Anayeli Patton LPN    2/24/2025, 16:57 EST

## 2025-02-25 ENCOUNTER — TELEPHONE (OUTPATIENT)
Dept: FAMILY MEDICINE CLINIC | Facility: CLINIC | Age: 65
End: 2025-02-25
Payer: COMMERCIAL

## 2025-02-25 ENCOUNTER — TRANSITIONAL CARE MANAGEMENT TELEPHONE ENCOUNTER (OUTPATIENT)
Dept: CALL CENTER | Facility: HOSPITAL | Age: 65
End: 2025-02-25
Payer: COMMERCIAL

## 2025-02-25 NOTE — OUTREACH NOTE
Call Center TCM Note      Flowsheet Row Responses   [facility] patient discharged from? [facility]   Does the patient have one of the following disease processes/diagnoses(primary or secondary)? Other   TCM attempt successful? No   Unsuccessful attempts Attempt 3   Wrap up additional comments D/C DX: Acute midline low back pain with right-sided sciatica,  [facility] 02/20/2025 - Unable to reach pt x 3 attempts for TCM call. Pt is not yet scheduled for TCM APPT with PCP Dr Wojciech Garcia. [facility]            Cass Smith MA    2/25/2025, 13:50 EST

## 2025-02-25 NOTE — PROGRESS NOTES
LEFT MESSAGE FOR PT TO CALL AND SCHEDULE HOSP FOLLOW UP   OK FOR THE HUB TO RELAY MESSAGE IF NEEDED

## 2025-02-25 NOTE — TELEPHONE ENCOUNTER
Hub staff attempted to follow warm transfer process and was unsuccessful     Caller: Wendy Dunn    Relationship to patient: Self    Best call back number: [phone]     Patient is needing HOSPITAL FOLLOW UP: [facility]-02/22/2551-[id number] PAIN AND TINGLING IN LEG

## 2025-03-03 NOTE — PROGRESS NOTES
Transitional Care Follow Up Visit  Subjective     Wendy Dunn is a 64 y.o. female who presents for a transitional care management visit.    Within 48 business hours after discharge our office contacted her via telephone to coordinate her care and needs.      I reviewed and discussed the details of that call along with the discharge summary, hospital problems, inpatient lab results, inpatient diagnostic studies, and consultation reports with Wendy.     Current outpatient and discharge medications have been reconciled for the patient.  Reviewed by: Wojciech Garcia MD          2/21/2025     5:48 PM   Date of TCM Phone Call   [facility]   Date of Admission 2/19/2025   Date of Discharge 2/21/2025   Discharge Disposition Home or Self Care     Risk for Readmission (LACE) Score: 5 (2/21/2025  6:00 AM)      History of Present Illness   Course During Hospital Stay:      Date of Admission: 2/19/2025   LOS: 0 days   PCP: Wojciech Garcia MD     Final Diagnosis pending           Subjective  Hospital Outcome:   Wendy Dunn is a 64 y.o. female who was admitted to the observation unit for further evaluation of acute lower back pain with right lower extremity radiculopathy.  Patient reports over the last week she is having worse pain in the buttocks that shoots down to her right foot.  There is some numbness and tingling in the right leg.  She denies lower extremity weakness.  Her pain is exacerbated with ambulation.  No loss of bowel or bladder control.  No saddle anesthesia.  No fever or chills.  No remote history of CVA.  No past medical history of back surgery or back injuries.  She denies trauma.     In the ED patient's chemistry, CBC were normal.  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  Plan for LESI this morning.  The patient fails to improve may need to obtain MRI.     2/20/2025  Patient resting comfortably overnight, no  "acute complaints. LESI performed by Pain Management, Dr. Rivera, and patient tolerated procedure well. Continue multimodal analgesic medication with Neurosurgery re-eval in am.     2/21/2025 2/21/2025     12:44 PM.  Patient has been evaluated by physical therapy and is being fit for discharge.  Patient states she is feeling significantly better than at the time of her admission.  She would like to go home at this time.  Will go ahead and write for 5 days prednisone, Robaxin 500 mg 3 times daily, Lidoderm patches for localized pain.  Will set the patient up with ambulatory physical therapy at discharge.  Patient to follow-up with neurosurgery in 3 to 4 weeks if symptoms do not return to baseline.    Current outpatient and discharge medications have been reconciled for the patient.  Reviewed by: Wojciech Garcia MD    Pt reports the pain is better but the right leg numbness persists.      The following portions of the patient's history were reviewed and updated as appropriate: allergies, current medications, past family history, past medical history, past social history, past surgical history, and problem list.    Review of Systems   Constitutional:  Negative for activity change, chills and fever.   Respiratory:  Negative for cough.    Cardiovascular:  Negative for chest pain.   Musculoskeletal:  Positive for back pain.   Neurological:  Positive for numbness.   Psychiatric/Behavioral:  Negative for dysphoric mood.        Objective   /72   Pulse 90   Temp 98.5 °F (36.9 °C) (Oral)   Resp 18   Ht 157.5 cm (62\")   Wt 79.4 kg (175 lb)   SpO2 99%   BMI 32.01 kg/m²   Physical Exam  Vitals and nursing note reviewed.   Constitutional:       General: She is not in acute distress.     Appearance: She is well-developed.   Pulmonary:      Effort: Pulmonary effort is normal.   Musculoskeletal:      Comments: Mildly positive right SLR   Neurological:      Mental Status: She is alert and oriented to person, place, " and time.   Psychiatric:         Behavior: Behavior normal.         Thought Content: Thought content normal.     Hospital records reviewed with pt confirming HPI.      Assessment & Plan   Diagnoses and all orders for this visit:    1. Acute right lumbar radiculopathy (Primary)  -     methocarbamol (ROBAXIN) 500 MG tablet; Take 1 tablet by mouth 3 (Three) Times a Day.  Dispense: 90 tablet; Refill: 2  -     diclofenac (VOLTAREN) 75 MG EC tablet; Take 1 tablet by mouth 2 (Two) Times a Day.  Dispense: 60 tablet; Refill: 2  -     Ambulatory Referral to Physical Therapy for Evaluation & Treatment    2. Hospital discharge follow-up    Pt encouraged to complete PT and will get over to Neurosurgery prn.

## 2025-03-04 ENCOUNTER — OFFICE VISIT (OUTPATIENT)
Dept: FAMILY MEDICINE CLINIC | Facility: CLINIC | Age: 65
End: 2025-03-04
Payer: COMMERCIAL

## 2025-03-04 VITALS
DIASTOLIC BLOOD PRESSURE: 72 MMHG | BODY MASS INDEX: 32.2 KG/M2 | HEIGHT: 62 IN | WEIGHT: 175 LBS | SYSTOLIC BLOOD PRESSURE: 118 MMHG | RESPIRATION RATE: 18 BRPM | OXYGEN SATURATION: 99 % | HEART RATE: 90 BPM | TEMPERATURE: 98.5 F

## 2025-03-04 DIAGNOSIS — Z09 HOSPITAL DISCHARGE FOLLOW-UP: ICD-10-CM

## 2025-03-04 DIAGNOSIS — M54.16 ACUTE RIGHT LUMBAR RADICULOPATHY: Primary | ICD-10-CM

## 2025-03-04 RX ORDER — DICLOFENAC SODIUM 75 MG/1
75 TABLET, DELAYED RELEASE ORAL 2 TIMES DAILY
Qty: 60 TABLET | Refills: 2 | Status: SHIPPED | OUTPATIENT
Start: 2025-03-04

## 2025-03-04 RX ORDER — METHOCARBAMOL 500 MG/1
500 TABLET, FILM COATED ORAL 3 TIMES DAILY
Qty: 90 TABLET | Refills: 2 | Status: SHIPPED | OUTPATIENT
Start: 2025-03-04 | End: 2025-03-08 | Stop reason: SINTOL

## 2025-03-07 ENCOUNTER — TELEPHONE (OUTPATIENT)
Dept: FAMILY MEDICINE CLINIC | Facility: CLINIC | Age: 65
End: 2025-03-07
Payer: COMMERCIAL

## 2025-03-07 NOTE — TELEPHONE ENCOUNTER
Caller: Wendy Dunn    Relationship: Self    Best call back number: [phone]      What was the call regarding: PATIENT HAS BEEN TAKING THE MEDICATION      methocarbamol (ROBAXIN) 500 MG tablet   AND ITS MAKING HER SICK SHE THREW UP LAST NIGHT AT WORK     PATIENT ALSO STATES MEDICATION CAUSE HER TO BE DROWSY     PLEASE GIVE A CALLBACK TO WHAT DR JIN RECOMMENDS

## 2025-03-08 RX ORDER — CYCLOBENZAPRINE HCL 10 MG
10 TABLET ORAL 2 TIMES DAILY PRN
Qty: 60 TABLET | Refills: 2 | Status: SHIPPED | OUTPATIENT
Start: 2025-03-08

## (undated) DEVICE — BLD DISSCT COOL CUT SJ CRVD 4MM 13CM

## (undated) DEVICE — SUT ETHLN 3/0 PS1 18IN 1663H

## (undated) DEVICE — BNDG,ELSTC,MATRIX,STRL,4"X5YD,LF,HOOK&LP: Brand: MEDLINE

## (undated) DEVICE — GLV SURG BIOGEL LTX PF 6 1/2

## (undated) DEVICE — SKIN PREP TRAY W/CHG: Brand: MEDLINE INDUSTRIES, INC.

## (undated) DEVICE — TBG PUMP ARTHSCP MAIN AR6400 16FT

## (undated) DEVICE — DRSNG WND GZ CURAD OIL EMULSION 3X3IN STRL

## (undated) DEVICE — PROB ABL APOLLORF MP50 ASP 50DEG

## (undated) DEVICE — UNDERCAST PADDING: Brand: DEROYAL